# Patient Record
Sex: FEMALE | Race: WHITE | NOT HISPANIC OR LATINO | Employment: UNEMPLOYED | URBAN - METROPOLITAN AREA
[De-identification: names, ages, dates, MRNs, and addresses within clinical notes are randomized per-mention and may not be internally consistent; named-entity substitution may affect disease eponyms.]

---

## 2017-01-27 ENCOUNTER — OFFICE VISIT (OUTPATIENT)
Dept: FAMILY MEDICINE | Facility: CLINIC | Age: 57
End: 2017-01-27

## 2017-01-27 VITALS
WEIGHT: 166.4 LBS | BODY MASS INDEX: 31.46 KG/M2 | TEMPERATURE: 97.9 F | OXYGEN SATURATION: 98 % | HEART RATE: 74 BPM | DIASTOLIC BLOOD PRESSURE: 89 MMHG | RESPIRATION RATE: 16 BRPM | SYSTOLIC BLOOD PRESSURE: 133 MMHG

## 2017-01-27 DIAGNOSIS — M25.511 ACUTE PAIN OF RIGHT SHOULDER: ICD-10-CM

## 2017-01-27 RX ORDER — NAPROXEN 500 MG/1
500 TABLET ORAL 2 TIMES DAILY WITH MEALS
Qty: 30 TABLET | Refills: 1 | Status: SHIPPED | OUTPATIENT
Start: 2017-01-27 | End: 2017-09-06

## 2017-01-27 ASSESSMENT — ENCOUNTER SYMPTOMS
RESPIRATORY NEGATIVE: 1
CARDIOVASCULAR NEGATIVE: 1

## 2017-01-27 NOTE — PROGRESS NOTES
HPI:       Chrissy Looney is a 56 year old who presents for the following  Patient presents with:  Musculoskeletal Problem: right arm pain     Onset of right shoulder pain two weeks ago.  Start at right shoulder and then radiates down to mid biceps.    Is unable to sleep on right arm.  Aggravated with movement, writing and lifting right arm.  Ice application has been helpful.   No injury or trauma.   No recent change in activity.       Problem, Medication and Allergy Lists were reviewed and are current.  Patient is an established patient of this clinic.         Review of Systems:   Review of Systems   Respiratory: Negative.    Cardiovascular: Negative.    Musculoskeletal:        See HPI             Physical Exam:   Patient Vitals for the past 24 hrs:   BP Temp Temp src Pulse Resp SpO2 Weight   01/27/17 0824 133/89 mmHg 97.9  F (36.6  C) Oral 74 16 98 % 166 lb 6.4 oz (75.479 kg)     Body mass index is 31.46 kg/(m^2).  Vitals were reviewed and were normal     Physical Exam   Constitutional: She appears well-developed. No distress.   Musculoskeletal:        Right shoulder: She exhibits tenderness (diffuse ttp over anterior and posterior joint, extending down to right biceps region). She exhibits normal range of motion (pain elicited with range of motion), no swelling and normal strength.         Assessment and Plan     Chrissy was seen today for musculoskeletal problem.    Diagnoses and all orders for this visit:    Acute pain of right shoulder  -     naproxen (NAPROSYN) 500 MG tablet; Take 1 tablet (500 mg) by mouth 2 times daily (with meals)  -     Schedule for 5-7 days then use as needed.   -     Ice application.   -     Consider physical therapy.     Options for treatment and follow-up care were reviewed with the patient. Chrissy Looney  engaged in the decision making process and verbalized understanding of the options discussed and agreed with the final plan.    Olga Koenig, MAE CNP

## 2017-01-27 NOTE — PATIENT INSTRUCTIONS
Here is the plan from today's visit    1. Acute pain of right shoulder    Schedule Naproxen, 500 mg 2 times daily with meals - 12 hours apart.     Ice application 3 times daily for 15-20 minutes.      Consider physical therapy with no improvement or worsening of pain.      Thank you for coming to [facility] today.  Lab Testing:  **If you had lab testing today and your results are reassuring or normal they will be mailed to you or sent through [employer] within 7 days.   **If the lab tests need quick action we will call you with the results.  The phone number we will call with results is # [phone] (home) . If this is not the best number please call our clinic and change the number.  Medication Refills:  If you need any refills please call your pharmacy and they will contact us.   If you need to  your refill at a new pharmacy, please contact the new pharmacy directly. The new pharmacy will help you get your medications transferred faster.   Scheduling:  If you have any concerns about today's visit or wish to schedule another appointment please call our office during normal business hours [phone] (8-5:00 M-F)  If a referral was made to a [facility] and you don't get a call from central scheduling please call [phone].  If a Mammogram was ordered for you at [facility] call [phone] to schedule or change your appointment.  If you had an XRay/CT/Ultrasound/MRI ordered the number is [phone] to schedule or change your radiology appointment.   Medical Concerns:  If you have urgent medical concerns please call [phone] at any time of the day.  If you have a medical emergency please call [phone].

## 2017-01-27 NOTE — MR AVS SNAPSHOT
After Visit Summary   1/27/2017    Chrissy Looney    MRN: [id number]           Patient Information     Date Of Birth          1960        Visit Information        Provider Department      1/27/2017 8:20 AM Olga Koenig APRN CNP [facility]        Today's Diagnoses     Acute pain of right shoulder           Care Instructions    Here is the plan from today's visit    1. Acute pain of right shoulder    Schedule Naproxen, 500 mg 2 times daily with meals - 12 hours apart.     Ice application 3 times daily for 15-20 minutes.      Consider physical therapy with no improvement or worsening of pain.      Thank you for coming to [facility] today.  Lab Testing:  **If you had lab testing today and your results are reassuring or normal they will be mailed to you or sent through [employer] within 7 days.   **If the lab tests need quick action we will call you with the results.  The phone number we will call with results is # [phone] (home) . If this is not the best number please call our clinic and change the number.  Medication Refills:  If you need any refills please call your pharmacy and they will contact us.   If you need to  your refill at a new pharmacy, please contact the new pharmacy directly. The new pharmacy will help you get your medications transferred faster.   Scheduling:  If you have any concerns about today's visit or wish to schedule another appointment please call our office during normal business hours [phone] (8-5:00 M-F)  If a referral was made to a [facility] and you don't get a call from central scheduling please call [phone].  If a Mammogram was ordered for you at [facility] call [phone] to schedule or change your appointment.  If you had an XRay/CT/Ultrasound/MRI ordered the number is [phone] to schedule or change your radiology appointment.   Medical Concerns:  If you have urgent medical  concerns please call [phone] at any time of the day.  If you have a medical emergency please call 911.          Follow-ups after your visit        Who to contact     Please call your clinic at [phone] to:    Ask questions about your health    Make or cancel appointments    Discuss your medicines    Learn about your test results    Speak to your doctor   If you have compliments or concerns about an experience at your clinic, or if you wish to file a complaint, please contact [facility] Patient Relations at [phone] or email us at [email or web address]         Additional Information About Your Visit        [employer] Information     Kuotust is an electronic gateway that provides easy, online access to your medical records. With Ujogo, you can request a clinic appointment, read your test results, renew a prescription or communicate with your care team.     To sign up for Ujogo visit the website at [email or web address]   You will be asked to enter the access code listed below, as well as some personal information. Please follow the directions to create your username and password.     Your access code is: [email or web address]  Expires: 2017  8:41 AM     Your access code will  in 90 days. If you need help or a new code, please contact your [facility] or call [phone] for assistance.        Care EveryWhere ID     This is your Care EveryWhere ID. This could be used by other organizations to access your [facility] medical records  [id number]        Your Vitals Were     Pulse Temperature Respirations Pulse Oximetry Last Period       74 97.9  F (36.6  C) (Oral) 16 98% 2013        Blood Pressure from Last 3 Encounters:   17 133/89   16 123/76   16 128/85    Weight from Last 3 Encounters:   17 166 lb 6.4 oz (75.479 kg)   16 162 lb (73.483 kg)   16 162 lb 3.2 oz (73.573 kg)              Today,  you had the following     No orders found for display         Where to get your medicines      These medications were sent to [facility] [place], [place] - [address] , [facility] [address]     Phone:  [phone]    - naproxen 500 MG tablet       Primary Care Provider Office Phone # Fax #    MAE Mireles [phone]       [facility] [address]  [facility] [address]        Thank you!     Thank you for choosing [facility]  for your care. Our goal is always to provide you with excellent care. Hearing back from our patients is one way we can continue to improve our services. Please take a few minutes to complete the written survey that you may receive in the mail after your visit with us. Thank you!             Your Updated Medication List - Protect others around you: Learn how to safely use, store and throw away your medicines at www.disposemymeds.org.          This list is accurate as of: 1/27/17  8:41 AM.  Always use your most recent med list.                   Brand Name Dispense Instructions for use    ACETAMINOPHEN 8 HOUR 650 MG 8 hour tablet   Generic drug:  acetaminophen     100 tablet    Take 1 tablet by mouth every 8 hours as needed       capsaicin 0.075 % cream    ZOSTRIX    60 g    Apply topically 3 times daily as needed       cetirizine 10 MG tablet    zyrTEC    90 tablet    Take 1 tablet (10 mg) by mouth daily       conjugated estrogens cream    PREMARIN    30 g    Place 0.5 g vaginally twice a week       cyclobenzaprine 5 MG tablet    FLEXERIL    20 tablet    Take 0.5-1 tablets (2.5-5 mg) by mouth nightly as needed for muscle spasms       fluticasone 50 MCG/ACT spray    FLONASE    16 g    Spray 1-2 sprays into both nostrils daily       lisinopril-hydrochlorothiazide 20-12.5 MG per tablet    PRINZIDE/ZESTORETIC    90 tablet    Take 1 tablet by mouth daily       naproxen 500 MG tablet    NAPROSYN    30 tablet     Take 1 tablet (500 mg) by mouth 2 times daily (with meals)       pseudoePHEDrine 30 MG tablet    SUDAFED    28 tablet    Take 2 tablets (60 mg) by mouth every 6 hours as needed for congestion

## 2017-02-16 ENCOUNTER — OFFICE VISIT (OUTPATIENT)
Dept: AUDIOLOGY | Facility: CLINIC | Age: 57
End: 2017-02-16

## 2017-02-16 DIAGNOSIS — H90.3 SENSORY HEARING LOSS, BILATERAL: Primary | ICD-10-CM

## 2017-03-15 ENCOUNTER — OFFICE VISIT (OUTPATIENT)
Dept: FAMILY MEDICINE | Facility: CLINIC | Age: 57
End: 2017-03-15

## 2017-03-15 VITALS
BODY MASS INDEX: 32.01 KG/M2 | SYSTOLIC BLOOD PRESSURE: 134 MMHG | TEMPERATURE: 97.7 F | WEIGHT: 169.4 LBS | OXYGEN SATURATION: 98 % | DIASTOLIC BLOOD PRESSURE: 86 MMHG | HEART RATE: 87 BPM

## 2017-03-15 DIAGNOSIS — H60.501 ACUTE OTITIS EXTERNA OF RIGHT EAR, UNSPECIFIED TYPE: Primary | ICD-10-CM

## 2017-03-15 RX ORDER — CIPROFLOXACIN 0.5 MG/.25ML
1 SOLUTION/ DROPS AURICULAR (OTIC) EVERY 12 HOURS
Qty: 2.5 ML | Refills: 0 | Status: SHIPPED | OUTPATIENT
Start: 2017-03-15 | End: 2017-03-20

## 2017-03-15 ASSESSMENT — ENCOUNTER SYMPTOMS
CARDIOVASCULAR NEGATIVE: 1
SORE THROAT: 0
RESPIRATORY NEGATIVE: 1
SINUS PRESSURE: 0
CONSTITUTIONAL NEGATIVE: 1
RHINORRHEA: 0

## 2017-03-15 NOTE — PROGRESS NOTES
HPI:       Chrissy Looney is a 57 year old who presents for the following  Patient presents with:  Ear Problem: Ear pain/ache since february that is getting worse    Recent sinusitis - month of February. Treated this at home with sinus rinse, sudafed.      Onset of right ear pain since last Thursday. It is noticeable discomfort.      No fever or chills.  +Headaches on and off.       Problem, Medication and Allergy Lists were reviewed and are current.  Patient is an established patient of this clinic.         Review of Systems:   Review of Systems   Constitutional: Negative.    HENT: Positive for ear pain. Negative for congestion, rhinorrhea, sinus pressure and sore throat.    Respiratory: Negative.    Cardiovascular: Negative.              Physical Exam:   Patient Vitals for the past 24 hrs:   BP Temp Temp src Pulse SpO2 Weight   03/15/17 0756 134/86 97.7  F (36.5  C) Oral 87 98 % 169 lb 6.4 oz (76.8 kg)     Body mass index is 32.01 kg/(m^2).  Vitals were reviewed and were normal     Physical Exam   Constitutional: She appears well-nourished. No distress.   HENT:   Right Ear: Tympanic membrane normal. There is tenderness (right tragus).   Left Ear: Tympanic membrane and ear canal normal.   Nose: No rhinorrhea.   Mouth/Throat: Oropharynx is clear and moist.   Right ear canal with mild erythema   Cardiovascular: Normal rate and regular rhythm.    Pulmonary/Chest: Effort normal and breath sounds normal.         Assessment and Plan       Chrissy was seen today for ear problem.    Diagnoses and all orders for this visit:    Acute otitis externa of right ear, unspecified type  -     ciprofloxacin (CETRAXAL) 0.2 % otic solution; Place 0.25 mLs (1 Dropperette) into the right ear every 12 hours for 5 days    Follow-up with no improvement or worsening of symptoms.         Options for treatment and follow-up care were reviewed with the patient. Chrissy Looney  engaged in the decision making process and verbalized understanding  of the options discussed and agreed with the final plan.    Olga Koenig, MAE CNP

## 2017-03-15 NOTE — PATIENT INSTRUCTIONS
Here is the plan from today's visit    1. Acute otitis externa of right ear, unspecified type  - ciprofloxacin (CETRAXAL) 0.2 % otic solution; Place 0.25 mLs (1 Dropperette) into the right ear every 12 hours for 5 days  Dispense: 2.5 mL; Refill: 0        Please call or return to clinic if your symptoms don't go away.    Follow up plan - with no improvement or worsening of symptoms.     Thank you for coming to [facility] today.  Lab Testing:  **If you had lab testing today and your results are reassuring or normal they will be mailed to you or sent through [employer] within 7 days.   **If the lab tests need quick action we will call you with the results.  The phone number we will call with results is # [phone] (home) . If this is not the best number please call our clinic and change the number.  Medication Refills:  If you need any refills please call your pharmacy and they will contact us.   If you need to  your refill at a new pharmacy, please contact the new pharmacy directly. The new pharmacy will help you get your medications transferred faster.   Scheduling:  If you have any concerns about today's visit or wish to schedule another appointment please call our office during normal business hours [phone] (8-5:00 M-F)  If a referral was made to a [facility] and you don't get a call from central scheduling please call [phone].  If a Mammogram was ordered for you at [facility] call [phone] to schedule or change your appointment.  If you had an XRay/CT/Ultrasound/MRI ordered the number is [phone] to schedule or change your radiology appointment.   Medical Concerns:  If you have urgent medical concerns please call [phone] at any time of the day.  If you have a medical emergency please call [phone].    External Ear Infection (Adult)    External otitis (also called  swimmer s ear ) is an infection in the ear canal. It is often caused by bacteria or fungus.  It can occur a few days after water gets trapped in the ear canal (from swimming or bathing). It can also occur after cleaning too deeply in the ear canal with a cotton swab or other object. Sometimes, hair care products get into the ear canal and cause this problem.  Symptoms can include pain, fever, itching, redness, drainage, or swelling of the ear canal. Temporary hearing loss may also occur.  Home care    Do not try to clean the ear canal. This can push pus and bacteria deeper into the canal.    Use prescribed ear drops as directed. These help reduce swelling and fight the infection. If an ear wick was placed in the ear canal, apply drops right onto the end of the wick. The wick will draw the medication into the ear canal even if it is swollen closed.    A cotton ball may be loosely placed in the outer ear to absorb any drainage.    You may use acetaminophen or ibuprofen to control pain, unless another medication was prescribed. Note: If you have chronic liver or kidney disease or ever had a stomach ulcer or GI bleeding, talk to your health care provider before taking any of these medications.    Do not allow water to get into your ear when bathing. Also, avoid swimming until the infection has cleared.  Prevention    Keep your ears dry. This helps lower the risk of infection. Dry your ears with a towel or hair dryer after getting wet. Also, use ear plugs when swimming.    Do not stick any objects in the ear to remove wax.    If you feel water trapped in your ear, use ear drops right away. You can get these drops over the counter at most drugstores.  They work by removing water from the ear canal.  Follow-up care  Follow up with your health care provider in one week, or as advised.   When to seek medical advice  Call your health care provider right away if any of these occur:    Ear pain becomes worse or doesn t improve after 3 days of treatment    Redness or swelling of the outer ear occurs or gets  worse    Headache    Painful or stiff neck    Drowsiness or confusion    Fever of 100.4 F (38 C) or higher, or as directed by your health care provider    Seizure    7264-1033 [employer]. [address] [place] [address], [place] [address]. All rights reserved. This information is not intended as a substitute for professional medical care. Always follow your healthcare professional's instructions.

## 2017-03-15 NOTE — MR AVS SNAPSHOT
After Visit Summary   3/15/2017    Chrissy Looney    MRN: [id number]           Patient Information     Date Of Birth          1960        Visit Information        Provider Department      3/15/2017 8:00 AM Olga Koenig APRN CNP [facility]        Today's Diagnoses     Acute otitis externa of right ear, unspecified type    -  1      Care Instructions    Here is the plan from today's visit    1. Acute otitis externa of right ear, unspecified type  - ciprofloxacin (CETRAXAL) 0.2 % otic solution; Place 0.25 mLs (1 Dropperette) into the right ear every 12 hours for 5 days  Dispense: 2.5 mL; Refill: 0        Please call or return to clinic if your symptoms don't go away.    Follow up plan - with no improvement or worsening of symptoms.     Thank you for coming to [facility] today.  Lab Testing:  **If you had lab testing today and your results are reassuring or normal they will be mailed to you or sent through [employer] within 7 days.   **If the lab tests need quick action we will call you with the results.  The phone number we will call with results is # [phone] (home) . If this is not the best number please call our clinic and change the number.  Medication Refills:  If you need any refills please call your pharmacy and they will contact us.   If you need to  your refill at a new pharmacy, please contact the new pharmacy directly. The new pharmacy will help you get your medications transferred faster.   Scheduling:  If you have any concerns about today's visit or wish to schedule another appointment please call our office during normal business hours [phone] (8-5:00 M-F)  If a referral was made to a [facility] and you don't get a call from central scheduling please call [phone].  If a Mammogram was ordered for you at [facility] call [phone] to schedule or change your appointment.  If you had an XRay/CT/Ultrasound/MRI  ordered the number is [phone] to schedule or change your radiology appointment.   Medical Concerns:  If you have urgent medical concerns please call [phone] at any time of the day.  If you have a medical emergency please call [phone].    External Ear Infection (Adult)    External otitis (also called  swimmer s ear ) is an infection in the ear canal. It is often caused by bacteria or fungus. It can occur a few days after water gets trapped in the ear canal (from swimming or bathing). It can also occur after cleaning too deeply in the ear canal with a cotton swab or other object. Sometimes, hair care products get into the ear canal and cause this problem.  Symptoms can include pain, fever, itching, redness, drainage, or swelling of the ear canal. Temporary hearing loss may also occur.  Home care    Do not try to clean the ear canal. This can push pus and bacteria deeper into the canal.    Use prescribed ear drops as directed. These help reduce swelling and fight the infection. If an ear wick was placed in the ear canal, apply drops right onto the end of the wick. The wick will draw the medication into the ear canal even if it is swollen closed.    A cotton ball may be loosely placed in the outer ear to absorb any drainage.    You may use acetaminophen or ibuprofen to control pain, unless another medication was prescribed. Note: If you have chronic liver or kidney disease or ever had a stomach ulcer or GI bleeding, talk to your health care provider before taking any of these medications.    Do not allow water to get into your ear when bathing. Also, avoid swimming until the infection has cleared.  Prevention    Keep your ears dry. This helps lower the risk of infection. Dry your ears with a towel or hair dryer after getting wet. Also, use ear plugs when swimming.    Do not stick any objects in the ear to remove wax.    If you feel water trapped in your ear, use ear drops right away. You can get these drops over  the counter at most drugstores.  They work by removing water from the ear canal.  Follow-up care  Follow up with your health care provider in one week, or as advised.   When to seek medical advice  Call your health care provider right away if any of these occur:    Ear pain becomes worse or doesn t improve after 3 days of treatment    Redness or swelling of the outer ear occurs or gets worse    Headache    Painful or stiff neck    Drowsiness or confusion    Fever of 100.4 F (38 C) or higher, or as directed by your health care provider    Seizure    8195-8704 [employer]. [address] [place] [address]. All rights reserved. This information is not intended as a substitute for professional medical care. Always follow your healthcare professional's instructions.              Follow-ups after your visit        Who to contact     Please call your clinic at [phone] to:    Ask questions about your health    Make or cancel appointments    Discuss your medicines    Learn about your test results    Speak to your doctor   If you have compliments or concerns about an experience at your clinic, or if you wish to file a complaint, please contact [facility] Patient Relations at [phone] or email us at [email or web address]         Additional Information About Your Visit        [email or web address] Information     Solsticet is an electronic gateway that provides easy, online access to your medical records. With [employer], you can request a clinic appointment, read your test results, renew a prescription or communicate with your care team.     To sign up for Solsticet visit the website at [email or web address]   You will be asked to enter the access code listed below, as well as some personal information. Please follow the directions to create your username and password.     Your access code is: [email or web address]  Expires: 2017  9:41 AM     Your access code will  in 90  days. If you need help or a new code, please contact your [facility] or call [phone] for assistance.        Care EveryWhere ID     This is your Care EveryWhere ID. This could be used by other organizations to access your [place] medical records  [id number]        Your Vitals Were     Pulse Temperature Last Period Pulse Oximetry BMI (Body Mass Index)       87 97.7  F (36.5  C) (Oral) 11/04/2013 98% 32.01 kg/m2        Blood Pressure from Last 3 Encounters:   03/15/17 134/86   01/27/17 133/89   12/06/16 123/76    Weight from Last 3 Encounters:   03/15/17 169 lb 6.4 oz (76.8 kg)   01/27/17 166 lb 6.4 oz (75.5 kg)   12/06/16 162 lb (73.5 kg)              Today, you had the following     No orders found for display         Today's Medication Changes          These changes are accurate as of: 3/15/17  8:12 AM.  If you have any questions, ask your nurse or doctor.               Start taking these medicines.        Dose/Directions    ciprofloxacin 0.2 % otic solution   Commonly known as:  CETRAXAL   Used for:  Acute otitis externa of right ear, unspecified type   Started by:  Olga Koenig APRN CNP        Dose:  1 Dropperette   Place 0.25 mLs (1 Dropperette) into the right ear every 12 hours for 5 days   Quantity:  2.5 mL   Refills:  0            Where to get your medicines      These medications were sent to [facility] [place], [place] - [address] [place] [address]     Phone:  [phone]     ciprofloxacin 0.2 % otic solution                Primary Care Provider Office Phone # Fax #    MAE Mireles [phone]       [facility] [address]  [place] [id number]        Thank you!     Thank you for choosing [facility]  for your care. Our goal is always to provide you with excellent care. Hearing back from our patients is one way we can continue to improve our services. Please take  a few minutes to complete the written survey that you may receive in the mail after your visit with us. Thank you!             Your Updated Medication List - Protect others around you: Learn how to safely use, store and throw away your medicines at www.disposemymeds.org.          This list is accurate as of: 3/15/17  8:12 AM.  Always use your most recent med list.                   Brand Name Dispense Instructions for use    ACETAMINOPHEN 8 HOUR 650 MG 8 hour tablet   Generic drug:  acetaminophen     100 tablet    Take 1 tablet by mouth every 8 hours as needed       capsaicin 0.075 % cream    ZOSTRIX    60 g    Apply topically 3 times daily as needed       cetirizine 10 MG tablet    zyrTEC    90 tablet    Take 1 tablet (10 mg) by mouth daily       ciprofloxacin 0.2 % otic solution    CETRAXAL    2.5 mL    Place 0.25 mLs (1 Dropperette) into the right ear every 12 hours for 5 days       conjugated estrogens cream    PREMARIN    30 g    Place 0.5 g vaginally twice a week       cyclobenzaprine 5 MG tablet    FLEXERIL    20 tablet    Take 0.5-1 tablets (2.5-5 mg) by mouth nightly as needed for muscle spasms       fluticasone 50 MCG/ACT spray    FLONASE    16 g    Spray 1-2 sprays into both nostrils daily       lisinopril-hydrochlorothiazide 20-12.5 MG per tablet    PRINZIDE/ZESTORETIC    90 tablet    Take 1 tablet by mouth daily       naproxen 500 MG tablet    NAPROSYN    30 tablet    Take 1 tablet (500 mg) by mouth 2 times daily (with meals)       pseudoePHEDrine 30 MG tablet    SUDAFED    28 tablet    Take 2 tablets (60 mg) by mouth every 6 hours as needed for congestion

## 2017-03-23 ENCOUNTER — OFFICE VISIT (OUTPATIENT)
Dept: FAMILY MEDICINE | Facility: CLINIC | Age: 57
End: 2017-03-23

## 2017-03-23 VITALS
SYSTOLIC BLOOD PRESSURE: 149 MMHG | HEART RATE: 91 BPM | WEIGHT: 168.8 LBS | OXYGEN SATURATION: 100 % | DIASTOLIC BLOOD PRESSURE: 89 MMHG | TEMPERATURE: 97.7 F | BODY MASS INDEX: 31.89 KG/M2 | RESPIRATION RATE: 18 BRPM

## 2017-03-23 DIAGNOSIS — T14.8XXA MUSCULOSKELETAL STRAIN: Primary | ICD-10-CM

## 2017-03-23 ASSESSMENT — ENCOUNTER SYMPTOMS
RHINORRHEA: 0
NECK PAIN: 1
CONSTITUTIONAL NEGATIVE: 1
SINUS PRESSURE: 0
CARDIOVASCULAR NEGATIVE: 1
SORE THROAT: 0
EYES NEGATIVE: 1
RESPIRATORY NEGATIVE: 1

## 2017-03-23 NOTE — MR AVS SNAPSHOT
After Visit Summary   3/23/2017    Chrissy Looney    MRN: [id number]           Patient Information     Date Of Birth          1960        Visit Information        Provider Department      3/23/2017 9:20 AM Olga Koenig APRN CNP [facility]        Today's Diagnoses     Musculoskeletal strain    -  1      Care Instructions    Here is the plan from today's visit    1. Musculoskeletal strain - Right neck.     Schedule Naproxen, 500 mg 2 times daily for 5 days, then use as needed.    Heat application 3-4 times daily for 20 minutes.     Follow-up with no improvement or worsening of symptoms.         Thank you for coming to [facility] today.  Lab Testing:  **If you had lab testing today and your results are reassuring or normal they will be mailed to you or sent through [employer] within 7 days.   **If the lab tests need quick action we will call you with the results.  The phone number we will call with results is # [phone] (home) . If this is not the best number please call our clinic and change the number.  Medication Refills:  If you need any refills please call your pharmacy and they will contact us.   If you need to  your refill at a new pharmacy, please contact the new pharmacy directly. The new pharmacy will help you get your medications transferred faster.   Scheduling:  If you have any concerns about today's visit or wish to schedule another appointment please call our office during normal business hours [phone] (8-5:00 M-F)  If a referral was made to a [facility] and you don't get a call from central scheduling please call [phone].  If a Mammogram was ordered for you at [facility] call [phone] to schedule or change your appointment.  If you had an XRay/CT/Ultrasound/MRI ordered the number is [phone] to schedule or change your radiology appointment.   Medical Concerns:  If you have urgent medical concerns  please call [phone] at any time of the day.  If you have a medical emergency please call 911.          Follow-ups after your visit        Who to contact     Please call your clinic at [phone] to:    Ask questions about your health    Make or cancel appointments    Discuss your medicines    Learn about your test results    Speak to your doctor   If you have compliments or concerns about an experience at your clinic, or if you wish to file a complaint, please contact [facility] Patient Relations at [phone] or email us at [email or web address]         Additional Information About Your Visit        eTippinghar[employer]F [employer] Information     INFERNO FITNESS NASHVILLEt is an electronic gateway that provides easy, online access to your medical records. With [employer], you can request a clinic appointment, read your test results, renew a prescription or communicate with your care team.     To sign up for IN[employer] NASHVILLEt visit the website at [email or web address]   You will be asked to enter the access code listed below, as well as some personal information. Please follow the directions to create your username and password.     Your access code is: [email or web address]  Expires: 2017  9:41 AM     Your access code will  in 90 days. If you need help or a new code, please contact your [facility] or call [phone] for assistance.        Care EveryWhere ID     This is your Care EveryWhere ID. This could be used by other organizations to access your [place] medical records  [id number]        Your Vitals Were     Pulse Temperature Respirations Last Period Pulse Oximetry Breastfeeding?    91 97.7  F (36.5  C) (Oral) 18 2013 100% No    BMI (Body Mass Index)                   31.89 kg/m2            Blood Pressure from Last 3 Encounters:   17 149/89   03/15/17 134/86   17 133/89    Weight from Last 3 Encounters:   17 168 lb 12.8 oz (76.6 kg)   03/15/17 169 lb 6.4  oz (76.8 kg)   01/27/17 166 lb 6.4 oz (75.5 kg)              Today, you had the following     No orders found for display       Primary Care Provider Office Phone # Fax #    MAE Mireles [facility] [place] [phone]       [facility] [address] [facility] [address]        Thank you!     Thank you for choosing [facility]  for your care. Our goal is always to provide you with excellent care. Hearing back from our patients is one way we can continue to improve our services. Please take a few minutes to complete the written survey that you may receive in the mail after your visit with us. Thank you!             Your Updated Medication List - Protect others around you: Learn how to safely use, store and throw away your medicines at www.disposemymeds.org.          This list is accurate as of: 3/23/17  9:43 AM.  Always use your most recent med list.                   Brand Name Dispense Instructions for use    ACETAMINOPHEN 8 HOUR 650 MG 8 hour tablet   Generic drug:  acetaminophen     100 tablet    Take 1 tablet by mouth every 8 hours as needed       capsaicin 0.075 % cream    ZOSTRIX    60 g    Apply topically 3 times daily as needed       cetirizine 10 MG tablet    zyrTEC    90 tablet    Take 1 tablet (10 mg) by mouth daily       conjugated estrogens cream    PREMARIN    30 g    Place 0.5 g vaginally twice a week       cyclobenzaprine 5 MG tablet    FLEXERIL    20 tablet    Take 0.5-1 tablets (2.5-5 mg) by mouth nightly as needed for muscle spasms       fluticasone 50 MCG/ACT spray    FLONASE    16 g    Spray 1-2 sprays into both nostrils daily       lisinopril-hydrochlorothiazide 20-12.5 MG per tablet    PRINZIDE/ZESTORETIC    90 tablet    Take 1 tablet by mouth daily       naproxen 500 MG tablet    NAPROSYN    30 tablet    Take 1 tablet (500 mg) by mouth 2 times daily (with meals)       pseudoePHEDrine 30 MG tablet    SUDAFED    28 tablet    Take 2 tablets (60 mg) by mouth  every 6 hours as needed for congestion

## 2017-03-23 NOTE — PROGRESS NOTES
HPI:       Chrissy Looney is a 57 year old who presents for the following  Patient presents with:  Ear Problem: right ear pain medication not helping     Was seen in clinic on 3/15/17 for otitis externa of right ear, was prescribed ciprofloxacin otic drops.   Ear pain got better in the first 2 days, now patient reports pain extending down her right neck.   No hearing changes, ear drainage.      Has been using Flonase and allergy medication on a regular basis.         Problem, Medication and Allergy Lists were reviewed and are current.  Patient is an established patient of this clinic.         Review of Systems:   Review of Systems   Constitutional: Negative.    HENT: Negative for congestion, ear discharge, ear pain, rhinorrhea, sinus pressure and sore throat.    Eyes: Negative.    Respiratory: Negative.    Cardiovascular: Negative.    Musculoskeletal: Positive for neck pain.             Physical Exam:   Patient Vitals for the past 24 hrs:   BP Temp Temp src Pulse Resp SpO2 Weight   03/23/17 0924 149/89 97.7  F (36.5  C) Oral 91 18 100 % 168 lb 12.8 oz (76.6 kg)     Body mass index is 31.89 kg/(m^2).  Vitals were reviewed     Physical Exam   Constitutional: She is oriented to person, place, and time. She appears well-developed and well-nourished. No distress.   HENT:   Right Ear: Tympanic membrane and ear canal normal.   Left Ear: Tympanic membrane and ear canal normal.   Neck: Normal range of motion. Muscular tenderness (right SCM is ttp) present.   Cardiovascular: Normal rate and regular rhythm.    Pulmonary/Chest: Effort normal and breath sounds normal.   Neurological: She is alert and oriented to person, place, and time.         Assessment and Plan     Chrissy was seen today for ear problem.    Diagnoses and all orders for this visit:    Musculoskeletal strain - right SCM    Reassurance regarding ear exam, normal findings.   Discussed Naproxen, 500 mg 2 times daily for 5 days, then use as needed.   Heat  application to right neck 4 times daily for 20 minutes and as needed.     Follow-up with no improvement or worsening of symptoms.       Options for treatment and follow-up care were reviewed with the patient. Chrissy Looney  engaged in the decision making process and verbalized understanding of the options discussed and agreed with the final plan.    MAE Mireles CNP

## 2017-03-23 NOTE — PATIENT INSTRUCTIONS
Here is the plan from today's visit    1. Musculoskeletal strain - Right neck.     Schedule Naproxen, 500 mg 2 times daily for 5 days, then use as needed.    Heat application 3-4 times daily for 20 minutes.     Follow-up with no improvement or worsening of symptoms.         Thank you for coming to [facility] today.  Lab Testing:  **If you had lab testing today and your results are reassuring or normal they will be mailed to you or sent through [employer] within 7 days.   **If the lab tests need quick action we will call you with the results.  The phone number we will call with results is # [phone] (home) . If this is not the best number please call our clinic and change the number.  Medication Refills:  If you need any refills please call your pharmacy and they will contact us.   If you need to  your refill at a new pharmacy, please contact the new pharmacy directly. The new pharmacy will help you get your medications transferred faster.   Scheduling:  If you have any concerns about today's visit or wish to schedule another appointment please call our office during normal business hours [phone] (8-5:00 M-F)  If a referral was made to a [facility] and you don't get a call from central scheduling please call [phone].  If a Mammogram was ordered for you at [facility] call [phone] to schedule or change your appointment.  If you had an XRay/CT/Ultrasound/MRI ordered the number is [phone] to schedule or change your radiology appointment.   Medical Concerns:  If you have urgent medical concerns please call [phone] at any time of the day.  If you have a medical emergency please call [phone].

## 2017-04-06 ENCOUNTER — OFFICE VISIT (OUTPATIENT)
Dept: FAMILY MEDICINE | Facility: CLINIC | Age: 57
End: 2017-04-06

## 2017-04-06 VITALS
BODY MASS INDEX: 32.31 KG/M2 | RESPIRATION RATE: 16 BRPM | SYSTOLIC BLOOD PRESSURE: 112 MMHG | TEMPERATURE: 98 F | HEART RATE: 92 BPM | DIASTOLIC BLOOD PRESSURE: 76 MMHG | OXYGEN SATURATION: 96 % | WEIGHT: 171 LBS

## 2017-04-06 DIAGNOSIS — H92.01 RIGHT EAR PAIN: Primary | ICD-10-CM

## 2017-04-06 RX ORDER — AMOXICILLIN 500 MG/1
500 CAPSULE ORAL 3 TIMES DAILY
Qty: 30 CAPSULE | Refills: 0 | Status: SHIPPED | OUTPATIENT
Start: 2017-04-06 | End: 2017-04-16

## 2017-04-06 ASSESSMENT — ENCOUNTER SYMPTOMS
CONSTITUTIONAL NEGATIVE: 1
SORE THROAT: 0
RESPIRATORY NEGATIVE: 1
CARDIOVASCULAR NEGATIVE: 1

## 2017-04-06 NOTE — PATIENT INSTRUCTIONS
Here is the plan from today's visit    1. Right ear pain  - amoxicillin (AMOXIL) 500 MG capsule; Take 1 capsule (500 mg) by mouth 3 times daily for 10 days  Dispense: 30 capsule; Refill: 0      Follow-up with no improvement or worsening of symptoms.     Thank you for coming to [facility] today.  Lab Testing:  **If you had lab testing today and your results are reassuring or normal they will be mailed to you or sent through [employer] within 7 days.   **If the lab tests need quick action we will call you with the results.  The phone number we will call with results is # [phone] (home) . If this is not the best number please call our clinic and change the number.  Medication Refills:  If you need any refills please call your pharmacy and they will contact us.   If you need to  your refill at a new pharmacy, please contact the new pharmacy directly. The new pharmacy will help you get your medications transferred faster.   Scheduling:  If you have any concerns about today's visit or wish to schedule another appointment please call our office during normal business hours [phone] (8-5:00 M-F)  If a referral was made to a [facility] and you don't get a call from central scheduling please call [phone].  If a Mammogram was ordered for you at [facility] call [phone] to schedule or change your appointment.  If you had an XRay/CT/Ultrasound/MRI ordered the number is [phone] to schedule or change your radiology appointment.   Medical Concerns:  If you have urgent medical concerns please call [phone] at any time of the day.  If you have a medical emergency please call [phone].

## 2017-04-06 NOTE — PROGRESS NOTES
HPI:       Chrissy Looney is a 57 year old who presents for the following  Patient presents with:  Otalgia: RT ear pain     Continued right ear pain.  Right neck pain has improved.      Patient was originally seen on 3/15/17 for right ear pain, treated for otitis externa with otic antibiotic drops, but this wasn't tolerated and didn't improve pain.  She was then see on 3/23/17 for continued right ear and right neck pain.  No s/s of otitis externa or otitis media at that time.        Problem, Medication and Allergy Lists were reviewed and are current.  Patient is an established patient of this clinic.         Review of Systems:   Review of Systems   Constitutional: Negative.    HENT: Positive for ear pain. Negative for congestion, sneezing and sore throat.    Respiratory: Negative.    Cardiovascular: Negative.              Physical Exam:   Patient Vitals for the past 24 hrs:   BP Temp Temp src Pulse Resp SpO2 Weight   04/06/17 0930 112/76 98  F (36.7  C) Oral 92 16 96 % 171 lb (77.6 kg)     Body mass index is 32.31 kg/(m^2).  Vitals were reviewed and were normal     Physical Exam   Constitutional: She appears well-nourished. No distress.   HENT:   Right Ear: There is tenderness (tragus is ttp and with movement of pinna).   Left Ear: Tympanic membrane and ear canal normal. No tenderness. Tympanic membrane is not erythematous.   Right ear canal with mild erythema, right TM is pearly gray and bony landmarks are visible, small amount of fluid visible   Cardiovascular: Normal rate and regular rhythm.    Pulmonary/Chest: Effort normal and breath sounds normal. No respiratory distress. She has no wheezes.     Assessment and Plan       Chrissy was seen today for otalgia.    Diagnoses and all orders for this visit:    Right ear pain - mild otitis external  Patient did not previously tolerate otic drops    -     amoxicillin (AMOXIL) 500 MG capsule; Take 1 capsule (500 mg) by mouth 3 times daily for 10 days    Follow-up with  no improvement or worsening of symptoms.       Options for treatment and follow-up care were reviewed with the patient. Chrissy Looney  engaged in the decision making process and verbalized understanding of the options discussed and agreed with the final plan.    MAE Mireles CNP

## 2017-05-23 ENCOUNTER — OFFICE VISIT (OUTPATIENT)
Dept: FAMILY MEDICINE | Facility: CLINIC | Age: 57
End: 2017-05-23

## 2017-05-23 VITALS
WEIGHT: 173.4 LBS | TEMPERATURE: 98 F | SYSTOLIC BLOOD PRESSURE: 137 MMHG | DIASTOLIC BLOOD PRESSURE: 90 MMHG | BODY MASS INDEX: 32.76 KG/M2 | OXYGEN SATURATION: 98 % | RESPIRATION RATE: 18 BRPM | HEART RATE: 85 BPM

## 2017-05-23 DIAGNOSIS — I10 BENIGN ESSENTIAL HYPERTENSION: ICD-10-CM

## 2017-05-23 DIAGNOSIS — Z00.00 ROUTINE GENERAL MEDICAL EXAMINATION AT A HEALTH CARE FACILITY: Primary | ICD-10-CM

## 2017-05-23 DIAGNOSIS — F43.23 ADJUSTMENT DISORDER WITH MIXED ANXIETY AND DEPRESSED MOOD: ICD-10-CM

## 2017-05-23 DIAGNOSIS — J30.2 SEASONAL ALLERGIC RHINITIS, UNSPECIFIED ALLERGIC RHINITIS TRIGGER: ICD-10-CM

## 2017-05-23 LAB
ALBUMIN SERPL-MCNC: 4.3 MG/DL (ref 3.5–4.7)
ALP SERPL-CCNC: 62.1 U/L (ref 31.7–110.5)
ALT SERPL-CCNC: 35.2 U/L (ref 0–45)
AST SERPL-CCNC: 24.9 U/L (ref 0–45)
BILIRUB SERPL-MCNC: 0.4 MG/DL (ref 0.2–1.3)
BUN SERPL-MCNC: 17 MG/DL (ref 7–19)
CALCIUM SERPL-MCNC: 10.2 MG/DL (ref 8.5–10.1)
CHLORIDE SERPLBLD-SCNC: 103.1 MMOL/L (ref 98–110)
CO2 SERPL-SCNC: 29.3 MMOL/L (ref 20–32)
CREAT SERPL-MCNC: 0.6 MG/DL (ref 0.5–1)
GFR SERPL CREATININE-BSD FRML MDRD: >90 ML/MIN/1.7 M2
GLUCOSE SERPL-MCNC: 105.4 MG'DL (ref 70–99)
HBA1C MFR BLD: 6.1 % (ref 4.1–5.7)
POTASSIUM SERPL-SCNC: 4 MMOL/DL (ref 3.3–4.5)
PROT SERPL-MCNC: 7.7 G/DL (ref 6.8–8.8)
SODIUM SERPL-SCNC: 139.6 MMOL/L (ref 132.6–141.4)

## 2017-05-23 RX ORDER — CETIRIZINE HYDROCHLORIDE 10 MG/1
10 TABLET ORAL DAILY
Qty: 90 TABLET | Refills: 3 | Status: SHIPPED | OUTPATIENT
Start: 2017-05-23 | End: 2017-12-07

## 2017-05-23 RX ORDER — LISINOPRIL AND HYDROCHLOROTHIAZIDE 12.5; 2 MG/1; MG/1
1 TABLET ORAL DAILY
Qty: 90 TABLET | Refills: 3 | Status: SHIPPED | OUTPATIENT
Start: 2017-05-23 | End: 2018-05-25

## 2017-05-23 NOTE — PROGRESS NOTES
"  Female Physical Note          HPI         Concerns today: increased \"moodiness\" over the past one month, worries a lot. +Irritability towards .      No history of treatment for depression or anxiety.  Daughters have depression/anxiety history.      Daughter,  and grandson are moving back home for approximately one month.  Daughter is also pregnant currently. This is a new stressor for patient.       Patient Active Problem List   Diagnosis     Essential hypertension     Glucose intolerance (impaired glucose tolerance)     Prediabetes     Low back pain     Chronic maxillary sinusitis       Past Medical History:   Diagnosis Date     NO ACTIVE PROBLEMS         Family History   Problem Relation Age of Onset     CANCER Mother      Hypertension Mother      DIABETES Sister      Alcohol/Drug Sister      Alcohol/Drug Brother      Allergies Other      Alzheimer Disease Other      aunt     Thyroid Disease Other      daughter     Hypertension Father               Review of Systems:     Review of Systems:  CONSTITUTIONAL: NEGATIVE for fever, chills, change in weight  INTEGUMENTARY/SKIN: NEGATIVE for worrisome rashes, moles or lesions  EYES: NEGATIVE for vision changes or irritation  ENT/MOUTH: NEGATIVE for ear, mouth and throat problems, wears hearing aids  RESP: NEGATIVE for significant cough or SOB  BREAST: NEGATIVE for masses, tenderness or discharge  CV: NEGATIVE for chest pain, palpitations or peripheral edema  GI: NEGATIVE for nausea, abdominal pain, heartburn, or change in bowel habits  : NEGATIVE for frequency, dysuria, or hematuria  MUSCULOSKELETAL: NEGATIVE for significant arthralgias or myalgia  NEURO: NEGATIVE for weakness, dizziness or paresthesias  ENDOCRINE: NEGATIVE for temperature intolerance, skin/hair changes  HEME/ALLERGY: NEGATIVE for bleeding problems  PSYCHIATRIC: see HPI    Sleep:   Do you snore most or the night (as reported by a family member)? No  Do you feel sleepy or extremely tired " during most of the day? No             Social History     Social History     Social History     Marital status:      Spouse name: N/A     Number of children: N/A     Years of education: N/A     Occupational History     Not on file.     Social History Main Topics     Smoking status: Former Smoker     Quit date: 2004     Smokeless tobacco: Not on file      Comment: quit      Alcohol use Yes      Comment: once  a month     Drug use: Not on file     Sexual activity: Not on file     Other Topics Concern     Not on file     Social History Narrative       Marital Status:  Who lives in your household? Lives with     Has anyone hurt you physically, for example by pushing, hitting, slapping or kicking you or forcing you to have sex? Denies  Do you feel threatened or controlled by a partner, ex-partner or anyone in your life? Denies    Sexual Health     Sexual concerns: No   STI History: Neg  Pregnancy History:   LMP Patient's last menstrual period was 2016  Last Pap Smear Date:   Lab Results   Component Value Date    PAP NIL 2014    PAP ASC-US 2013    PAP NIL 2010     Abnormal Pap History: Details: see above    Recommended Screening     Cholesterol Level (>44 yo or at risk):  Date done 5/3/16  Result(s)   Component Value Flag Ref Range Units Status Collected Lab   Cholesterol 180.7  0.0 - 200.0 mg/dL Final 2016  8:14 AM SM   Cholesterol/HDL Ratio 4.6  0.0 - 5.0  Final 2016  8:14 AM SM   HDL Cholesterol 39.5 (L) >40.0 mg/dL Final 2016  8:14 AM SM   LDL Cholesterol Calculated 119  0 - 129 mg/dL Final 2016  8:14 AM SM   Triglycerides 109.5  0.0 - 150.0 mg/dL Final 2016  8:14 AM SM   VLDL Cholesterol 21.9  7.0 - 32.0 mg/dL Final 2016  8:14 AM SM       Breast CA Screening (>39 yo or 10 y before 1st degree relative diagnosis): Recommended and patient accepted testing.           Physical Exam:     Vitals: /90  Pulse 85  Temp 98   F (36.7  C) (Oral)  Resp 18  Wt 173 lb 6.4 oz (78.7 kg)  LMP 11/04/2013  SpO2 98%  BMI 32.76 kg/m2  BMI= Body mass index is 32.76 kg/(m^2).   GENERAL: healthy, alert and no distress  EYES: Eyes grossly normal to inspection, extraocular movements - intact, and PERRL  HENT: ear canals- normal; TMs- normal; Nose- normal; Mouth- no ulcers, no lesions  NECK: no tenderness, no adenopathy, no asymmetry, no masses, no stiffness; thyroid- normal to palpation  RESP: lungs clear to auscultation - no rales, no rhonchi, no wheezes  CV: regular rates and rhythm, normal S1 S2, no S3 or S4 and no murmur  ABDOMEN: soft, no tenderness, no  hepatosplenomegaly, no masses, normal bowel sounds  MS: extremities- no gross deformities noted, no edema  SKIN: no suspicious lesions, no rashes  NEURO: strength and tone- normal, sensory exam- grossly normal, mentation- intact, speech- normal, reflexes- symmetric  PSYCH: Alert and oriented times 3; speech- coherent , normal rate and volume; able to articulate logical thoughts, able to abstract reason, no tangential thoughts, no hallucinations or delusions, affect- normal  LYMPHATICS: ant. cervical- normal, post. cervical- normal      Assessment and Plan      Chrissy was seen today for physical.    Diagnoses and all orders for this visit:    Routine general medical examination at a health care facility  -     Screening Mammogram Digital Bilateral; Future  -     Hemoglobin A1c ([facility])  -     Comprehensive Metabolic Panel ([facility])  -     Hepatitis C antibody    Seasonal allergic rhinitis, unspecified allergic rhinitis trigger  -     cetirizine (ZYRTEC) 10 MG tablet; Take 1 tablet (10 mg) by mouth daily    Benign essential hypertension  -     lisinopril-hydrochlorothiazide (PRINZIDE/ZESTORETIC) 20-12.5 MG per tablet; Take 1 tablet by mouth daily    Adjustment disorder with mixed anxiety and depressed mood - increased irritability  PHQ-9 score:  9  DONG-7 score;  4  -     BEHAVIORAL HEALTH  REFERRAL ([facility] and external) - patient open to starting therapy, happy that there are services here in clinic as she walks from her home that is close to the clinic.     Follow-up with no improvement or worsening.     Options for treatment and follow-up care were reviewed with the patient . Chrissy [place] and/or guardian engaged in the decision making process and verbalized understanding of the options discussed and agreed with the final plan.    Olga Koenig, MAE CNP

## 2017-05-23 NOTE — LETTER
May 24, 2017      Chrissy Looney  [address] [address]        Dear Chrissy,    Thank you for getting your care at [facility]. Please see below for your test results.    Your Hemoglobin A1C (Diabetes screening test) was stable and still indicates Pre-Diabetes.     The Hepatitis C screening test was negative and reassuring.     Your kidney and liver function tests were normal and reassuring.      Resulted Orders   Hemoglobin A1c ([facility])   Result Value Ref Range    Hemoglobin A1C 6.1 (H) 4.1 - 5.7 %   Comprehensive Metabolic Panel ([facility])   Result Value Ref Range    Albumin 4.3 3.5 - 4.7 mg/dL    Alkaline Phosphatase 62.1 31.7 - 110.5 U/L    ALT 35.2 0.0 - 45.0 U/L    AST 24.9 0.0 - 45.0 U/L    Bilirubin Total 0.4 0.2 - 1.3 mg/dL    Urea Nitrogen 17.0 7.0 - 19.0 mg/dL    Calcium 10.2 (H) 8.5 - 10.1 mg/dL    Chloride 103.1 98.0 - 110.0 mmol/L    Carbon Dioxide 29.3 20.0 - 32.0 mmol/L    Creatinine 0.6 0.5 - 1.0 mg/dL    Glucose 105.4 (H) 70.0 - 99.0 mg'dL    Potassium 4.0 3.3 - 4.5 mmol/dL    Sodium 139.6 132.6 - 141.4 mmol/L    Protein Total 7.7 6.8 - 8.8 g/dL    GFR Estimate >90 >60.0 mL/min/1.7 m2    GFR Estimate If Black >90 >60.0 mL/min/1.7 m2   Hepatitis C antibody   Result Value Ref Range    Hepatitis C Antibody  NR     Nonreactive   Assay performance characteristics have not been established for newborns,   infants, and children         If you have any concerns about these results please call and leave a message for me or send a Stitcht message to the clinic.    Sincerely,    Olga oKenig, MAE CNP

## 2017-05-23 NOTE — MR AVS SNAPSHOT
After Visit Summary   5/23/2017    Chrissy Looney    MRN: [id number]           Patient Information     Date Of Birth          1960        Visit Information        Provider Department      5/23/2017 8:20 AM Olga Koenig APRN CNP [facility]        Today's Diagnoses     Routine general medical examination at a health care facility    -  1    Seasonal allergic rhinitis, unspecified allergic rhinitis trigger        Benign essential hypertension        Adjustment disorder with mixed anxiety and depressed mood          Care Instructions    [facility] [phone]    Here is the plan from today's visit    1. Seasonal allergic rhinitis, unspecified allergic rhinitis trigger  - cetirizine (ZYRTEC) 10 MG tablet; Take 1 tablet (10 mg) by mouth daily  Dispense: 90 tablet; Refill: 3    2. Benign essential hypertension  - lisinopril-hydrochlorothiazide (PRINZIDE/ZESTORETIC) 20-12.5 MG per tablet; Take 1 tablet by mouth daily  Dispense: 90 tablet; Refill: 3    3. Routine general medical examination at a health care facility  - Screening Mammogram Digital Bilateral; Future  - Hemoglobin A1c ([facility])  - Comprehensive Metabolic Panel ([facility])  - Hepatitis C antibody        Thank you for coming to [facility] today.  Lab Testing:  **If you had lab testing today and your results are reassuring or normal they will be mailed to you or sent through Dumbstruck within 7 days.   **If the lab tests need quick action we will call you with the results.  The phone number we will call with results is # [phone] (home) . If this is not the best number please call our clinic and change the number.  Medication Refills:  If you need any refills please call your pharmacy and they will contact us.   If you need to  your refill at a new pharmacy, please contact the new pharmacy directly. The new pharmacy will help you get your medications transferred faster.   Scheduling:  If you have  any concerns about today's visit or wish to schedule another appointment please call our office during normal business hours [phone] (8-5:00 M-F)  If a referral was made to a [facility] and you don't get a call from central scheduling please call [phone].  If a Mammogram was ordered for you at [facility] call [phone] to schedule or change your appointment.  If you had an XRay/CT/Ultrasound/MRI ordered the number is [phone] to schedule or change your radiology appointment.   Medical Concerns:  If you have urgent medical concerns please call [phone] at any time of the day.  If you have a medical emergency please call 112.        Preventive Health Recommendations  Female Ages 50 - 64    Yearly exam: See your health care provider every year in order to  o Review health changes.   o Discuss preventive care.    o Review your medicines if your doctor has prescribed any.      Get a Pap test every three years (unless you have an abnormal result and your provider advises testing more often).    If you get Pap tests with HPV test, you only need to test every 5 years, unless you have an abnormal result.     You do not need a Pap test if your uterus was removed (hysterectomy) and you have not had cancer.    You should be tested each year for STDs (sexually transmitted diseases) if you're at risk.     Have a mammogram every 1 to 2 years.    Have a colonoscopy at age 50, or have a yearly FIT test (stool test). These exams screen for colon cancer.      Have a cholesterol test every 5 years, or more often if advised.    Have a diabetes test (fasting glucose) every three years. If you are at risk for diabetes, you should have this test more often.     If you are at risk for osteoporosis (brittle bone disease), think about having a bone density scan (DEXA).    Shots: Get a flu shot each year. Get a tetanus shot every 10 years.    Nutrition:     Eat at least 5 servings of fruits  and vegetables each day.    Eat whole-grain bread, whole-wheat pasta and brown rice instead of white grains and rice.    Talk to your provider about Calcium and Vitamin D.     Lifestyle    Exercise at least 150 minutes a week (30 minutes a day, 5 days a week). This will help you control your weight and prevent disease.    Limit alcohol to one drink per day.    No smoking.     Wear sunscreen to prevent skin cancer.     See your dentist every six months for an exam and cleaning.    See your eye doctor every 1 to 2 years.          Follow-ups after your visit        Additional Services     BEHAVIORAL HEALTH REFERRAL ([facility] inter[facility] and external)       Referring MD: KALYANI BAUMAN    May leave message on voicemail: Yes  PHQ-9 Score: 9  GAD7 Score: 4                  Future tests that were ordered for you today     Open Future Orders        Priority Expected Expires Ordered    Screening Mammogram Digital Bilateral Routine  5/23/2018 5/23/2017            Who to contact     Please call your clinic at [phone] to:    Ask questions about your health    Make or cancel appointments    Discuss your medicines    Learn about your test results    Speak to your doctor   If you have compliments or concerns about an experience at your clinic, or if you wish to file a complaint, please contact [facility] Patient Relations at [phone] or email us at [email or web address]         Additional Information About Your Visit        [employer] Mobile[employer]IPDIGS Information     Yassets is an electronic gateway that provides easy, online access to your medical records. With Yassets, you can request a clinic appointment, read your test results, renew a prescription or communicate with your care team.     To sign up for Yassets visit the website at [email or web address]   You will be asked to enter the access code listed below, as well as some personal information. Please follow the directions to create your  username and password.     Your access code is: [email or web address]  Expires: 2017  8:57 AM     Your access code will  in 90 days. If you need help or a new code, please contact your [facility] or call [phone] for assistance.        Care EveryWhere ID     This is your Care EveryWhere ID. This could be used by other organizations to access your [place] medical records  [id number]        Your Vitals Were     Pulse Temperature Respirations Last Period Pulse Oximetry BMI (Body Mass Index)    85 98  F (36.7  C) (Oral) 18 2013 98% 32.76 kg/m2       Blood Pressure from Last 3 Encounters:   17 137/90   17 112/76   17 149/89    Weight from Last 3 Encounters:   17 173 lb 6.4 oz (78.7 kg)   17 171 lb (77.6 kg)   17 168 lb 12.8 oz (76.6 kg)              We Performed the Following     BEHAVIORAL HEALTH REFERRAL ([facility] interal and external)     Comprehensive Metabolic Panel ([facility])     Hemoglobin A1c ([facility])     Hepatitis C antibody          Where to get your medicines      These medications were sent to [facility] [place], [place] - [address] [address]     Phone:  [phone]     cetirizine 10 MG tablet    lisinopril-hydrochlorothiazide 20-12.5 MG per tablet          Primary Care Provider Office Phone # Fax #    Olga MAE [facility] [phone]       [facility] [address] [facility] [address] [address]        Thank you!     Thank you for choosing [facility]  for your care. Our goal is always to provide you with excellent care. Hearing back from our patients is one way we can continue to improve our services. Please take a few minutes to complete the written survey that you may receive in the mail after your visit with us. Thank you!             Your Updated Medication List - Protect others around you: Learn how to safely use, store and throw away  your medicines at www.disposemymeds.org.          This list is accurate as of: 5/23/17  8:57 AM.  Always use your most recent med list.                   Brand Name Dispense Instructions for use    ACETAMINOPHEN 8 HOUR 650 MG 8 hour tablet   Generic drug:  acetaminophen     100 tablet    Take 1 tablet by mouth every 8 hours as needed       capsaicin 0.075 % cream    ZOSTRIX    60 g    Apply topically 3 times daily as needed       cetirizine 10 MG tablet    zyrTEC    90 tablet    Take 1 tablet (10 mg) by mouth daily       conjugated estrogens cream    PREMARIN    30 g    Place 0.5 g vaginally twice a week       cyclobenzaprine 5 MG tablet    FLEXERIL    20 tablet    Take 0.5-1 tablets (2.5-5 mg) by mouth nightly as needed for muscle spasms       fluticasone 50 MCG/ACT spray    FLONASE    16 g    Spray 1-2 sprays into both nostrils daily       lisinopril-hydrochlorothiazide 20-12.5 MG per tablet    PRINZIDE/ZESTORETIC    90 tablet    Take 1 tablet by mouth daily       naproxen 500 MG tablet    NAPROSYN    30 tablet    Take 1 tablet (500 mg) by mouth 2 times daily (with meals)       pseudoePHEDrine 30 MG tablet    SUDAFED    28 tablet    Take 2 tablets (60 mg) by mouth every 6 hours as needed for congestion

## 2017-05-23 NOTE — PATIENT INSTRUCTIONS
[facility] [phone]    Here is the plan from today's visit    1. Seasonal allergic rhinitis, unspecified allergic rhinitis trigger  - cetirizine (ZYRTEC) 10 MG tablet; Take 1 tablet (10 mg) by mouth daily  Dispense: 90 tablet; Refill: 3    2. Benign essential hypertension  - lisinopril-hydrochlorothiazide (PRINZIDE/ZESTORETIC) 20-12.5 MG per tablet; Take 1 tablet by mouth daily  Dispense: 90 tablet; Refill: 3    3. Routine general medical examination at a health care facility  - Screening Mammogram Digital Bilateral; Future  - Hemoglobin A1c ([facility])  - Comprehensive Metabolic Panel ([facility])  - Hepatitis C antibody        Thank you for coming to [facility] today.  Lab Testing:  **If you had lab testing today and your results are reassuring or normal they will be mailed to you or sent through [employer] within 7 days.   **If the lab tests need quick action we will call you with the results.  The phone number we will call with results is # [phone] (home) . If this is not the best number please call our clinic and change the number.  Medication Refills:  If you need any refills please call your pharmacy and they will contact us.   If you need to  your refill at a new pharmacy, please contact the new pharmacy directly. The new pharmacy will help you get your medications transferred faster.   Scheduling:  If you have any concerns about today's visit or wish to schedule another appointment please call our office during normal business hours [phone] (8-5:00 M-F)  If a referral was made to a [facility] and you don't get a call from central scheduling please call [phone].  If a Mammogram was ordered for you at [facility] call [phone] to schedule or change your appointment.  If you had an XRay/CT/Ultrasound/MRI ordered the number is [phone] to schedule or change your radiology appointment.   Medical Concerns:  If you have urgent medical concerns  please call [phone] at any time of the day.  If you have a medical emergency please call [phone].        Preventive Health Recommendations  Female Ages 50 - 64    Yearly exam: See your health care provider every year in order to  o Review health changes.   o Discuss preventive care.    o Review your medicines if your doctor has prescribed any.      Get a Pap test every three years (unless you have an abnormal result and your provider advises testing more often).    If you get Pap tests with HPV test, you only need to test every 5 years, unless you have an abnormal result.     You do not need a Pap test if your uterus was removed (hysterectomy) and you have not had cancer.    You should be tested each year for STDs (sexually transmitted diseases) if you're at risk.     Have a mammogram every 1 to 2 years.    Have a colonoscopy at age 50, or have a yearly FIT test (stool test). These exams screen for colon cancer.      Have a cholesterol test every 5 years, or more often if advised.    Have a diabetes test (fasting glucose) every three years. If you are at risk for diabetes, you should have this test more often.     If you are at risk for osteoporosis (brittle bone disease), think about having a bone density scan (DEXA).    Shots: Get a flu shot each year. Get a tetanus shot every 10 years.    Nutrition:     Eat at least 5 servings of fruits and vegetables each day.    Eat whole-grain bread, whole-wheat pasta and brown rice instead of white grains and rice.    Talk to your provider about Calcium and Vitamin D.     Lifestyle    Exercise at least 150 minutes a week (30 minutes a day, 5 days a week). This will help you control your weight and prevent disease.    Limit alcohol to one drink per day.    No smoking.     Wear sunscreen to prevent skin cancer.     See your dentist every six months for an exam and cleaning.    See your eye doctor every 1 to 2 years.

## 2017-05-24 ENCOUNTER — TELEPHONE (OUTPATIENT)
Dept: FAMILY MEDICINE | Facility: CLINIC | Age: 57
End: 2017-05-24

## 2017-05-24 LAB — HCV AB SERPL QL IA: NORMAL

## 2017-05-24 ASSESSMENT — PATIENT HEALTH QUESTIONNAIRE - PHQ9: SUM OF ALL RESPONSES TO PHQ QUESTIONS 1-9: 9

## 2017-05-24 NOTE — TELEPHONE ENCOUNTER
Mental Health Referral:  Please schedule with DIALLO Bishop      If you are unable to reach the patient after two phone attempts, please send a letter and close the encounter.      Thank you!

## 2017-05-25 ENCOUNTER — RADIANT APPOINTMENT (OUTPATIENT)
Dept: MAMMOGRAPHY | Facility: CLINIC | Age: 57
End: 2017-05-25
Attending: FAMILY MEDICINE
Payer: COMMERCIAL

## 2017-05-25 DIAGNOSIS — Z00.00 ROUTINE GENERAL MEDICAL EXAMINATION AT A HEALTH CARE FACILITY: ICD-10-CM

## 2017-05-25 PROCEDURE — G0202 SCR MAMMO BI INCL CAD: HCPCS

## 2017-05-31 ENCOUNTER — OFFICE VISIT (OUTPATIENT)
Dept: PSYCHOLOGY | Facility: CLINIC | Age: 57
End: 2017-05-31

## 2017-05-31 DIAGNOSIS — F43.23 ADJUSTMENT DISORDER WITH MIXED ANXIETY AND DEPRESSED MOOD: Primary | ICD-10-CM

## 2017-05-31 NOTE — MR AVS SNAPSHOT
After Visit Summary   2017    Chrissy Looney    MRN: [id number]           Patient Information     Date Of Birth          1960        Visit Information        Provider Department      2017 9:00 AM Lana Tejeda LICSW [facility]        Today's Diagnoses     Adjustment disorder with mixed anxiety and depressed mood    -  1       Follow-ups after your visit        Follow-up notes from your care team     Return in about 1 week (around 2017).      Your next 10 appointments already scheduled     2017 10:00 AM CDT   Return Visit with Lnaa Davis DIALLO [facility] ([facility])    [address],  [address]  [place] [address]   [phone]              Who to contact     Please call your clinic at [phone] to:    Ask questions about your health    Make or cancel appointments    Discuss your medicines    Learn about your test results    Speak to your doctor   If you have compliments or concerns about an experience at your clinic, or if you wish to file a complaint, please contact [facility] Patient Relations at [phone] or email us at [email or web address]         Additional Information About Your Visit        MyChart Information     Musicraiserhart is an electronic gateway that provides easy, online access to your medical records. With Threefold Photos, you can request a clinic appointment, read your test results, renew a prescription or communicate with your care team.     To sign up for SADAR 3Dt visit the website at [email or web address]   You will be asked to enter the access code listed below, as well as some personal information. Please follow the directions to create your username and password.     Your access code is: [email or web address]  Expires: 2017  8:57 AM     Your access code will  in 90 days. If you need help or a new code, please contact your [facility]  [place] [facility] or call [phone] for assistance.        Care EveryWhere ID     This is your Care EveryWhere ID. This could be used by other organizations to access your [place] medical records  [id number]        Your Vitals Were     Last Period                   11/04/2013            Blood Pressure from Last 3 Encounters:   05/23/17 137/90   04/06/17 112/76   03/23/17 149/89    Weight from Last 3 Encounters:   05/23/17 173 lb 6.4 oz (78.7 kg)   04/06/17 171 lb (77.6 kg)   03/23/17 168 lb 12.8 oz (76.6 kg)              Today, you had the following     No orders found for display       Primary Care Provider Office Phone # Fax #    Olga MAE Turner [facility] [phone]       [facility] [address] [facility] [address]        Thank you!     Thank you for choosing [facility]  for your care. Our goal is always to provide you with excellent care. Hearing back from our patients is one way we can continue to improve our services. Please take a few minutes to complete the written survey that you may receive in the mail after your visit with us. Thank you!             Your Updated Medication List - Protect others around you: Learn how to safely use, store and throw away your medicines at www.disposemymeds.org.          This list is accurate as of: 5/31/17 11:59 PM.  Always use your most recent med list.                   Brand Name Dispense Instructions for use    ACETAMINOPHEN 8 HOUR 650 MG 8 hour tablet   Generic drug:  acetaminophen     100 tablet    Take 1 tablet by mouth every 8 hours as needed       capsaicin 0.075 % cream    ZOSTRIX    60 g    Apply topically 3 times daily as needed       cetirizine 10 MG tablet    zyrTEC    90 tablet    Take 1 tablet (10 mg) by mouth daily       conjugated estrogens cream    PREMARIN    30 g    Place 0.5 g vaginally twice a week       cyclobenzaprine 5 MG tablet    FLEXERIL    20 tablet    Take 0.5-1 tablets (2.5-5 mg)  by mouth nightly as needed for muscle spasms       fluticasone 50 MCG/ACT spray    FLONASE    16 g    Spray 1-2 sprays into both nostrils daily       lisinopril-hydrochlorothiazide 20-12.5 MG per tablet    PRINZIDE/ZESTORETIC    90 tablet    Take 1 tablet by mouth daily       naproxen 500 MG tablet    NAPROSYN    30 tablet    Take 1 tablet (500 mg) by mouth 2 times daily (with meals)       pseudoePHEDrine 30 MG tablet    SUDAFED    28 tablet    Take 2 tablets (60 mg) by mouth every 6 hours as needed for congestion

## 2017-06-01 ASSESSMENT — ANXIETY QUESTIONNAIRES
3. WORRYING TOO MUCH ABOUT DIFFERENT THINGS: MORE THAN HALF THE DAYS
7. FEELING AFRAID AS IF SOMETHING AWFUL MIGHT HAPPEN: NOT AT ALL
5. BEING SO RESTLESS THAT IT IS HARD TO SIT STILL: SEVERAL DAYS
1. FEELING NERVOUS, ANXIOUS, OR ON EDGE: NOT AT ALL
6. BECOMING EASILY ANNOYED OR IRRITABLE: MORE THAN HALF THE DAYS
IF YOU CHECKED OFF ANY PROBLEMS ON THIS QUESTIONNAIRE, HOW DIFFICULT HAVE THESE PROBLEMS MADE IT FOR YOU TO DO YOUR WORK, TAKE CARE OF THINGS AT HOME, OR GET ALONG WITH OTHER PEOPLE: NOT DIFFICULT AT ALL
GAD7 TOTAL SCORE: 7
2. NOT BEING ABLE TO STOP OR CONTROL WORRYING: SEVERAL DAYS

## 2017-06-01 ASSESSMENT — PATIENT HEALTH QUESTIONNAIRE - PHQ9: 5. POOR APPETITE OR OVEREATING: SEVERAL DAYS

## 2017-06-01 NOTE — PROGRESS NOTES
"Behavioral Health Diagnostic Assessment:    Meeting was: scheduled  Others present: none   Meeting lasted: 60 minutes  Client was: on time    Assessment Summary: Diagnostic completed today. The patient is a 57 year old American female who was referred for mental health services for help with anxiety and depressive symptoms. Based on the patient's report of symptoms, she likely meets criteria for an anxiety disorder. However, additional assessment is warranted and a diagnosis of Adjustment disorder with mixed anxiety and depressed mood is being given today. The patient's mental health concerns have been affecting her ability to function well with her grandchildren and irritability toward her  and have been causing clinically significant distress. The patient also reports infrequent social drug/alcohol use The patient is also struggling with setting boundaries with her family with regard to caring for her grandchildren and asking for what she needs in her marriage.  She denies any safety concerns. Patient is feeling optimistic about starting therapy based on experiences that other family members and friends have had with therapy.  Based on the patient's reported symptoms and impact on functioning, the plan for the patient is outpatient psychotherapy at [facility].    DSM-V Diagnoses:  Adjustment Disorder with Anxiety and depressed mood    Orientation, Recommendations, & Plan:       Rights to and limits to confidentiality were discussed with patient.    Integrated care team and shared chart were discussed with patient and agreed upon.    Follow-up in 1 week to establish regular psychotherapy to address anxiety, mood symptoms, and difficulties in interpersonal relationships.    Goals for therapy = Patient would like to figure out how to get back to \"happy\" and feel engaged in her life again.    Collaboration with other professionals is not indicated at this time.    Referral to another professional/service " "is not indicated at this time..    A Release of Information is not needed at this time.    Mental Health Screening Questionnaires:    PHQ-9 SCORE 5/23/2017   Total Score 9     No flowsheet data found.  PTSD-PC = 0/4  CAGE AID:  0/4   Complete responses are available for review in the flow-sheet.     Current Presenting Problems or Complaints (including patient perception of problem and external factors contributing to current dilemma):   Patient has been considering coming to therapy for some time. She feels that things are okay in her life but feels that she is not like herself and feels anxious and worried all the time. She has some difficulty in her relationships but has a difficult time talking to those closest to her about how she is feeling.  Socially, she has noticed a decrease in close friendships and enjoys doing projects and crafts but finds herself doing them on her own.   Her spouse is not very engaged their marriage, per her report and she would really like to spend more time with him.    Review of Symptoms:  Depression: \"empty feeling\", sadness  Kirsten: none  Psychosis: none  Anxiety: Worry about family, most life type things. Difficulty with sleep due to racing thoughts.  Post Traumatic Stress Disorder: none    Patient Strengths and Resources: Patient is engaged with several hobbies and enjoys doing activities.     Previous Mental Health Concerns/Treatment:    Outpatient: None    Inpatient: None    Chemical Health History: denies any current or past concerns    Have you ever thought about cutting down your drug/alcohol use?  No  Do you ever get annoyed when people ask you about your drug/alcohol use: No  Do you ever feel guilty about your drug/alcohol use: No  Do you ever drink alcohol or use drugs in the morning: No    Patient past attempts to control alcohol/drug use (including informal approaches or formal treatment): none  Have there been any consequences related to clients drug use? no.    Mental " Status Exam:    Appearance:  Casually dressed, Adequately groomed, Dressed appropriately for weather and Appears younger than stated age  Behavior/Relationship to Examiner/Demeanor:  Cooperative and Engaged  Build: medium  Gait:  Normal  Psychomotor Activity: Normal  Speech rate:  Normal  Speech volume:  Normal  Speech coherence:  Normal  Speech spontaneity:  Normal  Mood (subjective report):  sad, anxious and worried  Affect (objective appearance):  Appropriate/mood-congruent  Eye Contact: good  Thought Process (Associations):  Logical, Linear and Goal directed  Thought process (Rate):  Normal  Abnormal Perception:  None  Sensorium:  Alert, Oriented to person, Oriented to place, Oriented to date/time and Oriented to situation  Attention/Concentration:  Normal  Insight:  Good  Judgment:  Good    Suicide Assessment:    Recent suicidal thoughts: No  Past suicidal thoughts: No  Any attempts in the past: No  Any family/friends/loved ones commit suicide: No  Plan or considering various methods: No  Access to guns: No  Protective factors: commitment to family, stable housing and future oriented  Verbal contract for safety: N/A    Non-Suicidal Self Injurious Behavior: No    Violence/Homicide Risk Assessment:     Problems with anger management: No  History of violence: No  History of significant damage to property: No  Threat made to harm or kill someone: No  Verbal contract for safety:NA    Safety Plan:   Chrissy Looney was provided with the phone number for emergency mental health services and was encouraged to call these local crisis numbers, 911, or visit a local emergency room if thoughts of suicide or homicide were to arise and/or if she were to be in acute distress. The patient agreed to utilize these services as indicated if the need were to arise. Chrissy Looney denied past or current suicidal or homicidal ideation, intent, or plan, denied a history of suicide attempts/self-harming behaviors. Based on these factors,  Chrissy Looney is considered to be sustainable as an outpatient at this time.     Social History and Associated Level of Functioning (See below):    Current Living Arrangements: patient lives with her spouse in her parents home. She got the house after her mother's death. Patient's daughter,  and child have also temporarily moved in.    Family/Children: Patient has two adult daughters with four grandchildren.    Intimate Relationship/Marriage:  She has been  for 34 years. She describes her  as a good, helpful man but their relationship is distant and she would like to spend more time with him.      Social Connection: She does not see her friends as much as she would like to anymore. She feels somewhat isolated as her friends are busy with their families.    Developmental History:  Typical    Abuse/Trauma: Denies    Work: Patient stayed at home and raised their children. She cared for her mother when she was sick and now cares for her grandchildren when they are off school.     Education: She completed high school    Legal: denies    Cultural/Belief System: Patient is  to a man from [place] heritage but does not speak Iranian nor has learned much about his heritage over the years.    Personal Health:     Patient Active Problem List   Diagnosis     Essential hypertension     Glucose intolerance (impaired glucose tolerance)     Prediabetes     Low back pain     Chronic maxillary sinusitis     Current Outpatient Prescriptions   Medication     cetirizine (ZYRTEC) 10 MG tablet     lisinopril-hydrochlorothiazide (PRINZIDE/ZESTORETIC) 20-12.5 MG per tablet     naproxen (NAPROSYN) 500 MG tablet     conjugated estrogens (PREMARIN) cream     pseudoePHEDrine (SUDAFED) 30 MG tablet     fluticasone (FLONASE) 50 MCG/ACT nasal spray     cyclobenzaprine (FLEXERIL) 5 MG tablet     capsaicin (ZOSTRIX) 0.075 % topical cream     ACETAMINOPHEN 8 HOUR 650 MG TABS     No current facility-administered medications  "for this visit.        NOTE: Diagnostic complete       Primary Care PTSD Screen  In your life, have you ever had any experience that was so frightening, horrible or upsetting that, in the past month, you...    1. Have had nightmares about it or thought about it when you did not want to? No  2. Tried hard not to think about it or went out of your way to avoid situations that remind you of it? No  3. Were constantly on guard, watchful, or easily startled? No  4. Felt numb or detached from others, activities, or your surroundings? No    Current research suggests that the results of the PC-PTSD should be considered \"positive\" if a patient answers \"yes\" to any (3) items.    References    JAKI Emanuel., BRUCE Graves., Kimerling, R., THOMAS Lawrence., ALICE Prieto., CLAUS Vanegas., ROMARIO Hancock, SERA Paige., Morrow, J.I. (2004). The primary care PTSD screen (PC-PTSD): development and operating characteristics. Primary Care Psychiatry, 9, 9-14.  "

## 2017-06-02 ASSESSMENT — PATIENT HEALTH QUESTIONNAIRE - PHQ9: SUM OF ALL RESPONSES TO PHQ QUESTIONS 1-9: 8

## 2017-06-02 ASSESSMENT — ANXIETY QUESTIONNAIRES: GAD7 TOTAL SCORE: 7

## 2017-07-13 ENCOUNTER — OFFICE VISIT (OUTPATIENT)
Dept: AUDIOLOGY | Facility: CLINIC | Age: 57
End: 2017-07-13

## 2017-07-13 DIAGNOSIS — H90.3 SENSORY HEARING LOSS, BILATERAL: Primary | ICD-10-CM

## 2017-09-06 ENCOUNTER — OFFICE VISIT (OUTPATIENT)
Dept: FAMILY MEDICINE | Facility: CLINIC | Age: 57
End: 2017-09-06

## 2017-09-06 VITALS
HEART RATE: 88 BPM | SYSTOLIC BLOOD PRESSURE: 135 MMHG | DIASTOLIC BLOOD PRESSURE: 86 MMHG | RESPIRATION RATE: 20 BRPM | WEIGHT: 161.2 LBS | BODY MASS INDEX: 30.46 KG/M2 | TEMPERATURE: 98 F | OXYGEN SATURATION: 97 %

## 2017-09-06 DIAGNOSIS — R10.13 ABDOMINAL PAIN, EPIGASTRIC: Primary | ICD-10-CM

## 2017-09-06 LAB
ALBUMIN SERPL-MCNC: 4.6 MG/DL (ref 3.5–4.7)
ALP SERPL-CCNC: 60.2 U/L (ref 31.7–110.5)
ALT SERPL-CCNC: 19.6 U/L (ref 0–45)
AST SERPL-CCNC: 16.8 U/L (ref 0–45)
BILIRUB SERPL-MCNC: <0.4 MG/DL (ref 0.2–1.3)
BUN SERPL-MCNC: 8.6 MG/DL (ref 7–19)
CALCIUM SERPL-MCNC: 9.2 MG/DL (ref 8.5–10.1)
CHLORIDE SERPLBLD-SCNC: 99.4 MMOL/L (ref 98–110)
CO2 SERPL-SCNC: 31.8 MMOL/L (ref 20–32)
CREAT SERPL-MCNC: 0.6 MG/DL (ref 0.5–1)
GFR SERPL CREATININE-BSD FRML MDRD: >90 ML/MIN/1.7 M2
GLUCOSE SERPL-MCNC: 111.9 MG'DL (ref 70–99)
POTASSIUM SERPL-SCNC: 3.6 MMOL/DL (ref 3.3–4.5)
PROT SERPL-MCNC: 8 G/DL (ref 6.8–8.8)
SODIUM SERPL-SCNC: 136.3 MMOL/L (ref 132.6–141.4)

## 2017-09-06 ASSESSMENT — ENCOUNTER SYMPTOMS
COUGH: 0
ABDOMINAL PAIN: 1
FEVER: 0
SHORTNESS OF BREATH: 0

## 2017-09-06 NOTE — MR AVS SNAPSHOT
After Visit Summary   9/6/2017    Chrissy Looney    MRN: [id number]           Patient Information     Date Of Birth          1960        Visit Information        Provider Department      9/6/2017 3:00 PM Moshe Cardozo MD [facility]        Today's Diagnoses     Abdominal pain, epigastric    -  1      Care Instructions    Here is the plan from today's visit    1. Abdominal pain, epigastric    - Comprehensive Metabolic Panel (LabDAQ)  - H. Pylori Antibody IGG  (LabDAQ)    Please call or return to clinic if your symptoms don't go away.    Follow up plan  Will be contacted via phone or mail for results.     Thank you for coming to [facility] today.  Lab Testing:  **If you had lab testing today and your results are reassuring or normal they will be mailed to you or sent through Hooked within 7 days.   **If the lab tests need quick action we will call you with the results.  The phone number we will call with results is # [phone] (home) . If this is not the best number please call our clinic and change the number.  Medication Refills:  If you need any refills please call your pharmacy and they will contact us.   If you need to  your refill at a new pharmacy, please contact the new pharmacy directly. The new pharmacy will help you get your medications transferred faster.   Scheduling:  If you have any concerns about today's visit or wish to schedule another appointment please call our office during normal business hours [phone] (8-5:00 M-F)  If a referral was made to a [facility] and you don't get a call from central scheduling please call [phone].  If a Mammogram was ordered for you at [facility] call [phone] to schedule or change your appointment.  If you had an XRay/CT/Ultrasound/MRI ordered the number is [phone] to schedule or change your radiology appointment.   Medical Concerns:  If you have urgent medical  concerns please call [phone] at any time of the day.  If you have a medical emergency please call 911.            Follow-ups after your visit        Who to contact     Please call your clinic at [phone] to:    Ask questions about your health    Make or cancel appointments    Discuss your medicines    Learn about your test results    Speak to your doctor   If you have compliments or concerns about an experience at your clinic, or if you wish to file a complaint, please contact [facility] Patient Relations at [phone] or email us at [email or web address]         Additional Information About Your Visit        ReppifyharSepSensor Information     Lighthouse BCSt is an electronic gateway that provides easy, online access to your medical records. With Teravac, you can request a clinic appointment, read your test results, renew a prescription or communicate with your care team.     To sign up for Teravac visit the website at [email or web address]   You will be asked to enter the access code listed below, as well as some personal information. Please follow the directions to create your username and password.     Your access code is: [email or web address]  Expires: 2017  3:46 PM     Your access code will  in 90 days. If you need help or a new code, please contact your [facility] or call [phone] for assistance.        Care EveryWhere ID     This is your Care EveryWhere ID. This could be used by other organizations to access your [place] medical records  [id number]        Your Vitals Were     Pulse Temperature Respirations Last Period Pulse Oximetry BMI (Body Mass Index)    88 98  F (36.7  C) (Oral) 20 2013 97% 30.46 kg/m2       Blood Pressure from Last 3 Encounters:   17 135/86   17 137/90   17 112/76    Weight from Last 3 Encounters:   17 161 lb 3.2 oz (73.1 kg)   17 173 lb 6.4 oz (78.7 kg)   17 171 lb (77.6  kg)              We Performed the Following     Comprehensive Metabolic Panel (LabDAQ)     H. Pylori Antibody IGG  (LabDAQ)        Primary Care Provider Office Phone # Fax #    MAE Mireles [facility] [phone]       [address] [facility] [address]        Equal Access to Services     CHUY CAMACHO : Hadii aad ku hadasho Soomaali, waaxda luqadaha, qaybta kaalmada adeegyada, waxay felixin haymarixan salima rodolfonorma daniels . So [facility] [phone].    ATENCIÓN: Si habla español, tiene a enriquez disposición servicios gratuitos de asistencia lingüística. Eriberto al [phone].    We comply with applicable federal civil rights laws and [place] laws. We do not discriminate on the basis of race, color, national origin, age, disability sex, sexual orientation or gender identity.            Thank you!     Thank you for choosing [facility]  for your care. Our goal is always to provide you with excellent care. Hearing back from our patients is one way we can continue to improve our services. Please take a few minutes to complete the written survey that you may receive in the mail after your visit with us. Thank you!             Your Updated Medication List - Protect others around you: Learn how to safely use, store and throw away your medicines at www.disposemymeds.org.          This list is accurate as of: 9/6/17  3:46 PM.  Always use your most recent med list.                   Brand Name Dispense Instructions for use Diagnosis    ACETAMINOPHEN 8 HOUR 650 MG 8 hour tablet   Generic drug:  acetaminophen     100 tablet    Take 1 tablet by mouth every 8 hours as needed    Left shoulder pain, Shoulder joint pain, left       capsaicin 0.075 % cream    ZOSTRIX    60 g    Apply topically 3 times daily as needed    Back pain, Left shoulder pain       cetirizine 10 MG tablet    zyrTEC    90 tablet    Take 1 tablet (10 mg) by mouth daily    Seasonal allergic rhinitis, unspecified allergic rhinitis  trigger       conjugated estrogens cream    PREMARIN    30 g    Place 0.5 g vaginally twice a week    Symptomatic menopausal or female climacteric states       cyclobenzaprine 5 MG tablet    FLEXERIL    20 tablet    Take 0.5-1 tablets (2.5-5 mg) by mouth nightly as needed for muscle spasms    Back pain       lisinopril-hydrochlorothiazide 20-12.5 MG per tablet    PRINZIDE/ZESTORETIC    90 tablet    Take 1 tablet by mouth daily    Benign essential hypertension       pseudoePHEDrine 30 MG tablet    SUDAFED    28 tablet    Take 2 tablets (60 mg) by mouth every 6 hours as needed for congestion    Dysfunction of eustachian tube, right

## 2017-09-06 NOTE — PROGRESS NOTES
HPI:       Chrissy Looney is a 57 year old who presents for the following  Patient presents with:  RECHECK: Follow up, Abdominal pain        Concern: Abdominal Pain   Description of the problem :  Started 8 days ago spontaneously.  Pain is dull, epigastrci.  No improvement noticed per pt with Ranitidine. Has had previous similar pain in the past but the symptoms have come back without relief with OTC medication. No c/o fever, pain scale 8/10 fluctuating. Pain is mild in the morning and gets worse throughout the day with meals.     Started taking Omeprazole per Pharmacist 9/5/17.  This is second day and she has not noticed any difference in symptoms.       Adherence and Exercise  Medication side effects: no  How often is a medication missed? Never  Exercise:walking  4-5 days/week for an average of 15-30 minutes          Problem, Medication and Allergy Lists were reviewed and are current.  Patient is an established patient of this clinic.         Review of Systems:   Review of Systems   Constitutional: Negative for fever.   Respiratory: Negative for cough and shortness of breath.    Gastrointestinal: Positive for abdominal pain.             Physical Exam:   Patient Vitals for the past 24 hrs:   BP Temp Temp src Pulse Resp SpO2 Weight   09/06/17 1504 135/86 98  F (36.7  C) Oral 88 20 97 % 161 lb 3.2 oz (73.1 kg)     Body mass index is 30.46 kg/(m^2).  Vitals were reviewed and were normal     Physical Exam   Constitutional: She appears well-developed and well-nourished. No distress.   Neck: Normal range of motion. Neck supple. No thyromegaly present.   Cardiovascular: Normal rate, regular rhythm, normal heart sounds and intact distal pulses.    No murmur heard.  Pulmonary/Chest: Effort normal and breath sounds normal. She has no wheezes.   Abdominal: Soft. Bowel sounds are normal. She exhibits no distension. There is no hepatosplenomegaly. There is tenderness in the periumbilical area. There is no CVA tenderness.    Bowel sounds are present but hypoactive, pain xypoid process, no other present tenderness in abdomen.   Lymphadenopathy:     She has no cervical adenopathy.         Results:       Assessment and Plan     Chrissy was seen today for recheck.    Diagnoses and all orders for this visit:    Abdominal pain, epigastric - diff dx includes PUD, gastritis, cholelithiasis.  It is a little early to evaluate effectiveness of PPI therapy.  She will continue omeprazole and we will investigate bloodwork for PUD and gall bladder disease.  -     Comprehensive Metabolic Panel (LabDAQ)  -     Cancel: H. Pylori Antibody IGG  (LabDAQ)  -     H Pylori antigen stool; Future      There are no discontinued medications.  Options for treatment and follow-up care were reviewed with the patient. Chrissy Looney  engaged in the decision making process and verbalized understanding of the options discussed and agreed with the final plan.    Moshe Cardozo MD

## 2017-09-06 NOTE — PATIENT INSTRUCTIONS
Here is the plan from today's visit    1. Abdominal pain, epigastric    - Comprehensive Metabolic Panel ([facility])  - H. Pylori Antibody IGG  ([facility])    Please call or return to clinic if your symptoms don't go away.    Follow up plan  Will be contacted via phone or mail for results.     Thank you for coming to [facility] today.  Lab Testing:  **If you had lab testing today and your results are reassuring or normal they will be mailed to you or sent through TripMark within 7 days.   **If the lab tests need quick action we will call you with the results.  The phone number we will call with results is # [phone] (home) . If this is not the best number please call our clinic and change the number.  Medication Refills:  If you need any refills please call your pharmacy and they will contact us.   If you need to  your refill at a new pharmacy, please contact the new pharmacy directly. The new pharmacy will help you get your medications transferred faster.   Scheduling:  If you have any concerns about today's visit or wish to schedule another appointment please call our office during normal business hours [phone] (8-5:00 M-F)  If a referral was made to a [facility] and you don't get a call from central scheduling please call [phone].  If a Mammogram was ordered for you at [facility] call [phone] to schedule or change your appointment.  If you had an XRay/CT/Ultrasound/MRI ordered the number is [phone] to schedule or change your radiology appointment.   Medical Concerns:  If you have urgent medical concerns please call [phone] at any time of the day.  If you have a medical emergency please call [phone].

## 2017-09-07 DIAGNOSIS — R10.13 ABDOMINAL PAIN, EPIGASTRIC: ICD-10-CM

## 2017-09-11 LAB
H PYLORI AG STL QL IA: NORMAL
SPECIMEN SOURCE: NORMAL

## 2017-09-18 ENCOUNTER — TELEPHONE (OUTPATIENT)
Dept: FAMILY MEDICINE | Facility: CLINIC | Age: 57
End: 2017-09-18

## 2017-09-18 NOTE — TELEPHONE ENCOUNTER
----- Message from Moshe Cardozo MD sent at 9/18/2017  2:11 PM CDT -----  Please call this pt and ask how her abdominal pain is doing.  You can inform her that her labwork was fine.

## 2017-09-18 NOTE — TELEPHONE ENCOUNTER
"Called patient per request. Patient reports her pain is \"100% better\"    Message routed to Dr CLAUS Cardozo to update.    Patricia Enriquez RN    "

## 2017-10-17 ENCOUNTER — OFFICE VISIT (OUTPATIENT)
Dept: AUDIOLOGY | Facility: CLINIC | Age: 57
End: 2017-10-17

## 2017-10-17 DIAGNOSIS — H90.3 SENSORY HEARING LOSS, BILATERAL: Primary | ICD-10-CM

## 2017-11-17 ENCOUNTER — ALLIED HEALTH/NURSE VISIT (OUTPATIENT)
Dept: FAMILY MEDICINE | Facility: CLINIC | Age: 57
End: 2017-11-17

## 2017-11-17 DIAGNOSIS — Z00.00 ROUTINE GENERAL MEDICAL EXAMINATION AT A HEALTH CARE FACILITY: Primary | ICD-10-CM

## 2017-11-17 NOTE — NURSING NOTE

## 2017-11-17 NOTE — MR AVS SNAPSHOT
After Visit Summary   11/17/2017    Chrissy Looney    MRN: [id number]           Patient Information     Date Of Birth          1960        Visit Information        Provider Department      11/17/2017 9:00 AM [facility]        Today's Diagnoses     Routine general medical examination at a health care facility    -  1       Follow-ups after your visit        Who to contact     Please call your clinic at [phone] to:    Ask questions about your health    Make or cancel appointments    Discuss your medicines    Learn about your test results    Speak to your doctor   If you have compliments or concerns about an experience at your clinic, or if you wish to file a complaint, please contact [facility] Patient Relations at [phone] or email us at [email or web address]         Additional Information About Your Visit        MyChart Information     [employer] gives you secure access to your electronic health record. If you see a primary care provider, you can also send messages to your care team and make appointments. If you have questions, please call your primary care clinic.  If you do not have a primary care provider, please call [phone] and they will assist you.      iBuildApp is an electronic gateway that provides easy, online access to your medical records. With iBuildApp, you can request a clinic appointment, read your test results, renew a prescription or communicate with your care team.     To access your existing account, please contact your [facility] or call [phone] for assistance.        Care EveryWhere ID     This is your Care EveryWhere ID. This could be used by other organizations to access your [place] medical records  [id number]        Your Vitals Were     Last Period                   11/04/2013            Blood Pressure from Last 3 Encounters:   09/06/17 135/86    05/23/17 137/90   04/06/17 112/76    Weight from Last 3 Encounters:   09/06/17 161 lb 3.2 oz (73.1 kg)   05/23/17 173 lb 6.4 oz (78.7 kg)   04/06/17 171 lb (77.6 kg)              We Performed the Following     ADMIN VACCINE, INITIAL     FLU VAC PRESRV FREE QUAD SPLIT VIR IM, 0.5 mL dosage        Primary Care Provider Office Phone # Fax #    Olga Koenig, APRN [facility] [phone]       [address] [facility] [address]        Equal Access to Services     [facility]: Hadii aad ku hadasho Sochelly, waaxda ericaqadaha, qaybta kaalmaelvira abebe, brigitte daniels . So [facility] [phone].    ATENCIÓN: Si habla español, tiene a enriquez disposición servicios gratuitos de asistencia lingüística. [facility] [phone].    We comply with applicable federal civil rights laws and [place] laws. We do not discriminate on the basis of race, color, national origin, age, disability, sex, sexual orientation, or gender identity.            Thank you!     Thank you for choosing \[facility]\"" [facility]  for your care. Our goal is always to provide you with excellent care. Hearing back from our patients is one way we can continue to improve our services. Please take a few minutes to complete the written survey that you may receive in the mail after your visit with us. Thank you!             Your Updated Medication List - Protect others around you: Learn how to safely use, store and throw away your medicines at www.disposemymeds.org.          This list is accurate as of: 11/17/17 11:59 PM.  Always use your most recent med list.                   Brand Name Dispense Instructions for use Diagnosis    ACETAMINOPHEN 8 HOUR 650 MG 8 hour tablet   Generic drug:  acetaminophen     100 tablet    Take 1 tablet by mouth every 8 hours as needed    Left shoulder pain, Shoulder joint pain, left       capsaicin 0.075 % cream    ZOSTRIX    60 g    Apply topically 3 times daily as needed    Back pain, Left  shoulder pain       cetirizine 10 MG tablet    zyrTEC    90 tablet    Take 1 tablet (10 mg) by mouth daily    Seasonal allergic rhinitis, unspecified allergic rhinitis trigger       conjugated estrogens cream    PREMARIN    30 g    Place 0.5 g vaginally twice a week    Symptomatic menopausal or female climacteric states       cyclobenzaprine 5 MG tablet    FLEXERIL    20 tablet    Take 0.5-1 tablets (2.5-5 mg) by mouth nightly as needed for muscle spasms    Back pain       lisinopril-hydrochlorothiazide 20-12.5 MG per tablet    PRINZIDE/ZESTORETIC    90 tablet    Take 1 tablet by mouth daily    Benign essential hypertension       pseudoePHEDrine 30 MG tablet    SUDAFED    28 tablet    Take 2 tablets (60 mg) by mouth every 6 hours as needed for congestion    Dysfunction of Eustachian tube, right

## 2017-12-07 ENCOUNTER — OFFICE VISIT (OUTPATIENT)
Dept: FAMILY MEDICINE | Facility: CLINIC | Age: 57
End: 2017-12-07

## 2017-12-07 ENCOUNTER — TELEPHONE (OUTPATIENT)
Dept: FAMILY MEDICINE | Facility: CLINIC | Age: 57
End: 2017-12-07

## 2017-12-07 VITALS
RESPIRATION RATE: 16 BRPM | HEART RATE: 94 BPM | WEIGHT: 167.2 LBS | OXYGEN SATURATION: 98 % | TEMPERATURE: 98.1 F | DIASTOLIC BLOOD PRESSURE: 87 MMHG | SYSTOLIC BLOOD PRESSURE: 132 MMHG | BODY MASS INDEX: 31.59 KG/M2

## 2017-12-07 DIAGNOSIS — N95.1 SYMPTOMATIC MENOPAUSAL OR FEMALE CLIMACTERIC STATES: ICD-10-CM

## 2017-12-07 DIAGNOSIS — N89.8 VAGINAL IRRITATION: Primary | ICD-10-CM

## 2017-12-07 LAB
BACTERIA: NORMAL
CLUE CELLS: NORMAL
MOTILE TRICHOMONAS: NEGATIVE
ODOR: NORMAL
PH WET PREP: <4.5
WBC WET PREP: NORMAL
YEAST: NORMAL

## 2017-12-07 RX ORDER — ESTRADIOL 0.1 MG/G
2 CREAM VAGINAL
Qty: 42.5 G | Refills: 6 | Status: SHIPPED | OUTPATIENT
Start: 2017-12-07 | End: 2018-07-19

## 2017-12-07 ASSESSMENT — ENCOUNTER SYMPTOMS
MYALGIAS: 0
DIARRHEA: 0
ABDOMINAL PAIN: 0
CONSTIPATION: 0
FREQUENCY: 1
CHILLS: 0
NAUSEA: 0
DYSURIA: 1
FEVER: 0

## 2017-12-07 NOTE — MR AVS SNAPSHOT
After Visit Summary   12/7/2017    Chrissy Looney    MRN: [id number]           Patient Information     Date Of Birth          1960        Visit Information        Provider Department      12/7/2017 8:40 AM Olga Koenig APRN CNP [facility]        Today's Diagnoses     Vaginal irritation    -  1    Symptomatic menopausal or female climacteric states          Care Instructions    Here is the plan from today's visit    1. Vaginal irritation  - Wet Prep ([facility])    Results for orders placed or performed in visit on 12/07/17   Wet Prep ([facility])   Result Value Ref Range    Yeast Wet Prep None none    Motile Trichomonas Wet Prep Negative Negative    Clue Cells Wet Prep None NONE    WBC WET PREP 10-25 2 - 5    Bacteria Wet Prep Few None    pH Wet Prep <4.5 3.8 - 4.5    Odor Wet Prep None NONE       2. Symptomatic menopausal or female climacteric states  - estradiol (ESTRACE VAGINAL) 0.1 MG/GM cream; Place 2 g vaginally twice a week  Dispense: 42.5 g; Refill: 6  Continue to use vaseline or Aquaphor to external vagina as needed.       Follow-up with no improvement or worsening of symptoms.       Thank you for coming to [facility] today.  Lab Testing:  **If you had lab testing today and your results are reassuring or normal they will be mailed to you or sent through Jeeves within 7 days.   **If the lab tests need quick action we will call you with the results.  The phone number we will call with results is # [phone] (home) . If this is not the best number please call our clinic and change the number.  Medication Refills:  If you need any refills please call your pharmacy and they will contact us.   If you need to  your refill at a new pharmacy, please contact the new pharmacy directly. The new pharmacy will help you get your medications transferred faster.   Scheduling:  If you have any concerns about today's visit or wish to schedule another appointment please  call our office during normal business hours [phone] (8-5:00 M-F)  If a referral was made to a [facility] and you don't get a call from central scheduling please call [phone].  If a Mammogram was ordered for you at [facility] call [phone] to schedule or change your appointment.  If you had an XRay/CT/Ultrasound/MRI ordered the number is [phone] to schedule or change your radiology appointment.   Medical Concerns:  If you have urgent medical concerns please call [phone] at any time of the day.  If you have a medical emergency please call [phone].            Follow-ups after your visit        Who to contact     Please call your clinic at [phone] to:    Ask questions about your health    Make or cancel appointments    Discuss your medicines    Learn about your test results    Speak to your doctor   If you have compliments or concerns about an experience at your clinic, or if you wish to file a complaint, please contact [facility] Patient Relations at [phone] or email us at [email or web address]         Additional Information About Your Visit        [employer] Information     Minded gives you secure access to your electronic health record. If you see a primary care provider, you can also send messages to your care team and make appointments. If you have questions, please call your primary care clinic.  If you do not have a primary care provider, please call [phone] and they will assist you.      Minded is an electronic gateway that provides easy, online access to your medical records. With Minded, you can request a clinic appointment, read your test results, renew a prescription or communicate with your care team.     To access your existing account, please contact your [facility] or call [phone] for assistance.        Care EveryWhere ID     This is your Care EveryWhere ID. This  could be used by other organizations to access your [place] medical records  [id number]        Your Vitals Were     Pulse Temperature Respirations Last Period Pulse Oximetry Breastfeeding?    94 98.1  F (36.7  C) (Oral) 16 11/04/2013 98% No    BMI (Body Mass Index)                   31.59 kg/m2            Blood Pressure from Last 3 Encounters:   12/07/17 132/87   09/06/17 135/86   05/23/17 137/90    Weight from Last 3 Encounters:   12/07/17 167 lb 3.2 oz (75.8 kg)   09/06/17 161 lb 3.2 oz (73.1 kg)   05/23/17 173 lb 6.4 oz (78.7 kg)              We Performed the Following     Wet Prep ([facility])          Today's Medication Changes          These changes are accurate as of: 12/7/17  9:30 AM.  If you have any questions, ask your nurse or doctor.               Start taking these medicines.        Dose/Directions    estradiol 0.1 MG/GM cream   Commonly known as:  ESTRACE VAGINAL   Used for:  Symptomatic menopausal or female climacteric states   Started by:  Olga Koenig APRN CNP        Dose:  2 g   Place 2 g vaginally twice a week   Quantity:  42.5 g   Refills:  6         Stop taking these medicines if you haven't already. Please contact your care team if you have questions.     capsaicin 0.075 % cream   Commonly known as:  ZOSTRIX   Stopped by:  Olga Koenig APRN CNP           cetirizine 10 MG tablet   Commonly known as:  zyrTEC   Stopped by:  Olga Koenig APRN CNP           conjugated estrogens cream   Commonly known as:  PREMARIN   Stopped by:  Olga Koenig APRN CNP           cyclobenzaprine 5 MG tablet   Commonly known as:  FLEXERIL   Stopped by:  Olga Koenig APRN CNP           pseudoePHEDrine 30 MG tablet   Commonly known as:  SUDAFED   Stopped by:  Olga Koenig APRN CNP                Where to get your medicines      These medications were sent to [facility] [place], [place] - [address] [facility] [address]     Phone:   [phone]     estradiol 0.1 MG/GM cream                Primary Care Provider Office Phone # Fax #    Olga Koenig, APRN [facility] [phone]       [address] [facility] [address]        Equal Access to Services     CHUY CAMACHO : Hadii adolph velásquez eloy Somariangelali, waaxda luqadaha, qaybta kaalmada adeegyada, brigitte felixin hayaarichard pearsonzulay santamaria frances welsh. So [facility] [phone].    ATENCIÓN: Si habla español, tiene a enriquez disposición servicios gratuitos de asistencia lingüística. Llame al [phone].    We comply with applicable federal civil rights laws and [place] laws. We do not discriminate on the basis of race, color, national origin, age, disability, sex, sexual orientation, or gender identity.            Thank you!     Thank you for choosing [facility]  for your care. Our goal is always to provide you with excellent care. Hearing back from our patients is one way we can continue to improve our services. Please take a few minutes to complete the written survey that you may receive in the mail after your visit with us. Thank you!             Your Updated Medication List - Protect others around you: Learn how to safely use, store and throw away your medicines at www.disposemymeds.org.          This list is accurate as of: 12/7/17  9:30 AM.  Always use your most recent med list.                   Brand Name Dispense Instructions for use Diagnosis    ACETAMINOPHEN 8 HOUR 650 MG 8 hour tablet   Generic drug:  acetaminophen     100 tablet    Take 1 tablet by mouth every 8 hours as needed    Left shoulder pain, Shoulder joint pain, left       estradiol 0.1 MG/GM cream    ESTRACE VAGINAL    42.5 g    Place 2 g vaginally twice a week    Symptomatic menopausal or female climacteric states       lisinopril-hydrochlorothiazide 20-12.5 MG per tablet    PRINZIDE/ZESTORETIC    90 tablet    Take 1 tablet by mouth daily    Benign essential hypertension

## 2017-12-07 NOTE — TELEPHONE ENCOUNTER
[facility] phone call message- medication clarification/question:    Full Medication Name: estradiol (ESTRACE VAGINAL) 0.1 MG/GM cream   Dose: Place 2 g vaginally twice a week    Question: Patient calling to advise that her insurance is currently pending/inactive and she is not able to  med prescribed by CLAUS Koenig today. Patient inquiring if provider can suggest alternative over-the-counter med that she can use instead.     Pharmacy confirmed as [place], [place] - [address]: Yes    OK to leave a message on voice mail? Yes    Primary language: English      needed? No    Call taken on December 7, 2017 at 12:47 PM by Autumn Arroyo

## 2017-12-07 NOTE — TELEPHONE ENCOUNTER
Unfortunately there isn't an OTC topical estrogen cream available.     Continue with Vaseline or Aquaphor to external vaginal tissue 2-3 times daily as needed.     -Piedad, CNP

## 2017-12-07 NOTE — TELEPHONE ENCOUNTER
RN called patient and relayed recommendations below. Patient verbalized understanding.    Jes Dickey RN

## 2017-12-07 NOTE — PROGRESS NOTES
"      HPI:       Chrissy Looney is a 57 year old who presents for the following  Patient presents with:  Vaginal Problem: burning around vaginal opening, ongoing 3 days     Vaginal Symptoms  Onset: 3 days ago    Description:  Vaginal Discharge: none   Itching (Pruritis): no  Burning sensation: Yes Details: worsens with urination  Odor: no    Accompanying Signs & Symptoms:    Pain with Urination:Yes. Details: she feels it's from urine going over the irritated area. \"Possibly a little frequency and occasional urgency\".   Abdominal Pain: no  Fever: no    History:     Sexually active: Yes Details: same partner for 35 years  New Partner: no  Possibility of Pregnancy:  No    Precipitating factors:   Recent Antibiotic Use: no       Alleviating factors:  Petroleum gel helps a little. She has not tried anything else.     She was prescribed Premarin cream last December for the same symptoms. She used it for about 1 month and then stopped since her symptoms were gone. She hasn't had any issues since and has not used the Premarin since. It is now . She is concerned about the cost of purchasing more estrogen cream.       Problem, Medication and Allergy Lists were reviewed and are current.  Patient is an established patient of this clinic.         Review of Systems:   Review of Systems   Constitutional: Negative for chills and fever.   Gastrointestinal: Negative for abdominal pain, constipation, diarrhea and nausea.   Genitourinary: Positive for dysuria (related to vaginal irritation), frequency and vaginal pain. Negative for vaginal bleeding and vaginal discharge.   Musculoskeletal: Negative for myalgias.             Physical Exam:   Patient Vitals for the past 24 hrs:   BP Temp Temp src Pulse Resp SpO2 Weight   17 0838 132/87 98.1  F (36.7  C) Oral 94 16 98 % 167 lb 3.2 oz (75.8 kg)     Body mass index is 31.59 kg/(m^2).  Vitals were reviewed and were normal     Physical Exam   Constitutional: She is oriented to " person, place, and time. She appears well-developed and well-nourished. No distress.   Genitourinary: Vagina normal. No erythema or bleeding in the vagina. No signs of injury around the vagina. No vaginal discharge found.   Genitourinary Comments: Cervical polyp   Neurological: She is alert and oriented to person, place, and time.   Psychiatric: She has a normal mood and affect.         Results:      Results from the last 24 hours  Results for orders placed or performed in visit on 12/07/17 (from the past 24 hour(s))   Wet Prep ([facility])   Result Value Ref Range    Yeast Wet Prep None none    Motile Trichomonas Wet Prep Negative Negative    Clue Cells Wet Prep None NONE    WBC WET PREP 10-25 2 - 5    Bacteria Wet Prep Few None    pH Wet Prep <4.5 3.8 - 4.5    Odor Wet Prep None NONE     Assessment and Plan     Chrissy was seen today for vaginal problem.    Diagnoses and all orders for this visit:    Vaginal irritation  -     Wet Prep ([facility])- negative for evidence of cause of symptoms  -     Likely secondary to hypoestrogen state    Symptomatic menopausal or female climacteric states  -     estradiol (ESTRACE VAGINAL) 0.1 MG/GM cream; Place 2 g vaginally twice a week    Continue with Aquaphor or vaseline to external vaginal area as needed.        Options for treatment and follow-up care were reviewed with the patient. Chrissy Looney  engaged in the decision making process and verbalized understanding of the options discussed and agreed with the final plan.      Return to clinic as needed if symptoms don't improve or worsen.       Edith Lopez RN, DNP-FNP student [facility]    History and physical examination done with student nurse practitioner.  Student acted as scribe for this encounter.  I agree with assessment and plan for this patient.     Olga Koenig, MAE CNP

## 2017-12-07 NOTE — PATIENT INSTRUCTIONS
Here is the plan from today's visit    1. Vaginal irritation  - Wet Prep ([facility])    Results for orders placed or performed in visit on 12/07/17   Wet Prep ([facility])   Result Value Ref Range    Yeast Wet Prep None none    Motile Trichomonas Wet Prep Negative Negative    Clue Cells Wet Prep None NONE    WBC WET PREP 10-25 2 - 5    Bacteria Wet Prep Few None    pH Wet Prep <4.5 3.8 - 4.5    Odor Wet Prep None NONE       2. Symptomatic menopausal or female climacteric states  - estradiol (ESTRACE VAGINAL) 0.1 MG/GM cream; Place 2 g vaginally twice a week  Dispense: 42.5 g; Refill: 6  Continue to use vaseline or Aquaphor to external vagina as needed.       Follow-up with no improvement or worsening of symptoms.       Thank you for coming to [facility] today.  Lab Testing:  **If you had lab testing today and your results are reassuring or normal they will be mailed to you or sent through CareShare within 7 days.   **If the lab tests need quick action we will call you with the results.  The phone number we will call with results is # [phone] (home) . If this is not the best number please call our clinic and change the number.  Medication Refills:  If you need any refills please call your pharmacy and they will contact us.   If you need to  your refill at a new pharmacy, please contact the new pharmacy directly. The new pharmacy will help you get your medications transferred faster.   Scheduling:  If you have any concerns about today's visit or wish to schedule another appointment please call our office during normal business hours [phone] (8-5:00 M-F)  If a referral was made to a [facility] and you don't get a call from central scheduling please call [phone].  If a Mammogram was ordered for you at [facility] call [phone] to schedule or change your appointment.  If you had an XRay/CT/Ultrasound/MRI ordered the number is [phone] to schedule or change your  radiology appointment.   Medical Concerns:  If you have urgent medical concerns please call [phone] at any time of the day.  If you have a medical emergency please call 531.

## 2018-02-15 ENCOUNTER — OFFICE VISIT (OUTPATIENT)
Dept: FAMILY MEDICINE | Facility: CLINIC | Age: 58
End: 2018-02-15
Payer: COMMERCIAL

## 2018-02-15 VITALS
DIASTOLIC BLOOD PRESSURE: 84 MMHG | RESPIRATION RATE: 14 BRPM | BODY MASS INDEX: 32.08 KG/M2 | SYSTOLIC BLOOD PRESSURE: 122 MMHG | TEMPERATURE: 97.9 F | OXYGEN SATURATION: 98 % | HEART RATE: 86 BPM | WEIGHT: 169.8 LBS

## 2018-02-15 DIAGNOSIS — Z48.02 ENCOUNTER FOR STAPLE REMOVAL: Primary | ICD-10-CM

## 2018-02-15 ASSESSMENT — ENCOUNTER SYMPTOMS
CONSTITUTIONAL NEGATIVE: 1
ROS SKIN COMMENTS: SEE HPI

## 2018-02-15 NOTE — PROGRESS NOTES
HPI:       Chrissy Looney is a 57 year old who presents for the following  Patient presents with:  Suture Removal    Was seen at [facility] ED on 2/11/18 for head injury s/p fall.  Had 2 staples placed.      No drainage, discharge, swelling or pain at site.        Problem, Medication and Allergy Lists were   reviewed and are current.     Patient Active Problem List    Diagnosis Date Noted     Chronic maxillary sinusitis 03/16/2016     Priority: Medium     Chronic maxillary and frontal sinusitis starting in February.  Unchanged with one round of amoxicillin.  Recommended daily flonase use.       Low back pain 11/04/2013     Priority: Medium     Diagnosis updated by automated process. Provider to review and confirm.       Prediabetes 01/24/2013     Priority: Medium     Essential hypertension 10/11/2012     Priority: Medium     Glucose intolerance (impaired glucose tolerance) 10/11/2012     Priority: Medium   ,     Current Outpatient Prescriptions   Medication Sig Dispense Refill     lisinopril-hydrochlorothiazide (PRINZIDE/ZESTORETIC) 20-12.5 MG per tablet Take 1 tablet by mouth daily 90 tablet 3     estradiol (ESTRACE VAGINAL) 0.1 MG/GM cream Place 2 g vaginally twice a week (Patient not taking: Reported on 2/15/2018) 42.5 g 6     ACETAMINOPHEN 8 HOUR 650 MG TABS Take 1 tablet by mouth every 8 hours as needed (Patient not taking: Reported on 2/15/2018) 100 tablet 1   ,   No Known Allergies  Patient is an established patient of this clinic.         Review of Systems:   Review of Systems   Constitutional: Negative.    Skin:        See HPI             Physical Exam:   Patient Vitals for the past 24 hrs:   BP Temp Pulse Resp SpO2 Weight   02/15/18 0802 122/84 97.9  F (36.6  C) 86 14 98 % 169 lb 12.8 oz (77 kg)     Body mass index is 32.08 kg/(m^2).  Vitals were reviewed and were normal     Physical Exam   HENT:   Head:             Assessment and Plan     Chrissy was seen today for suture removal.    Diagnoses and all orders  for this visit:    Encounter for staple removal    2 staples removed from head, well tolerated.       Follow-up in clinic as needed.     Options for treatment and follow-up care were reviewed with the patient. Chrissy Looney  engaged in the decision making process and verbalized understanding of the options discussed and agreed with the final plan.    MAE Mireles CNP

## 2018-02-15 NOTE — MR AVS SNAPSHOT
After Visit Summary   2/15/2018    Chrissy Looney    MRN: [id number]           Patient Information     Date Of Birth          1960        Visit Information        Provider Department      2/15/2018 8:00 AM Olga Koenig APRN CNP [facility]        Today's Diagnoses     Encounter for staple removal    -  1       Follow-ups after your visit        Who to contact     Please call your clinic at [phone] to:    Ask questions about your health    Make or cancel appointments    Discuss your medicines    Learn about your test results    Speak to your doctor            Additional Information About Your Visit        MyChart Information     [employer] gives you secure access to your electronic health record. If you see a primary care provider, you can also send messages to your care team and make appointments. If you have questions, please call your primary care clinic.  If you do not have a primary care provider, please call [phone] and they will assist you.      [employer] is an electronic gateway that provides easy, online access to your medical records. With [employer] you can request a clinic appointment, read your test results, renew a prescription or communicate with your care team.     To access your existing account, please contact your [facility] or call [phone] for assistance.        Care EveryWhere ID     This is your Care EveryWhere ID. This could be used by other organizations to access your [place] medical records  [id number]        Your Vitals Were     Pulse Temperature Respirations Last Period Pulse Oximetry BMI (Body Mass Index)    86 97.9  F (36.6  C) 14 11/04/2013 98% 32.08 kg/m2       Blood Pressure from Last 3 Encounters:   02/15/18 122/84   12/07/17 132/87   09/06/17 135/86    Weight from Last 3 Encounters:   02/15/18 169 lb 12.8 oz (77 kg)   12/07/17 167 lb 3.2 oz (75.8 kg)   09/06/17 161 lb 3.2 oz (73.1 kg)               Today, you had the following     No orders found for display       Primary Care Provider Office Phone # Fax #    Olga Koenig, APRN [facility] [phone]       [address]  [facility] [address]        Equal Access to Services     CHUY CAMACHO : Hadii aad ku hadmarie Sochelly, waaxda luqadaha, qaybta kaalmada adehelenda, brigitte sotorichard pearsonzulay santamaria frances welsh. So [facility] [phone].    ATENCIÓN: Si habla español, tiene a enriquez disposición servicios gratuitos de asistencia lingüística. Llame al [phone].    We comply with applicable federal civil rights laws and [place] laws. We do not discriminate on the basis of race, color, national origin, age, disability, sex, sexual orientation, or gender identity.            Thank you!     Thank you for choosing [facility]  for your care. Our goal is always to provide you with excellent care. Hearing back from our patients is one way we can continue to improve our services. Please take a few minutes to complete the written survey that you may receive in the mail after your visit with us. Thank you!             Your Updated Medication List - Protect others around you: Learn how to safely use, store and throw away your medicines at www.disposemymeds.org.          This list is accurate as of 2/15/18  8:20 AM.  Always use your most recent med list.                   Brand Name Dispense Instructions for use Diagnosis    ACETAMINOPHEN 8 HOUR 650 MG 8 hour tablet   Generic drug:  acetaminophen     100 tablet    Take 1 tablet by mouth every 8 hours as needed    Left shoulder pain, Shoulder joint pain, left       estradiol 0.1 MG/GM cream    ESTRACE VAGINAL    42.5 g    Place 2 g vaginally twice a week    Symptomatic menopausal or female climacteric states       lisinopril-hydrochlorothiazide 20-12.5 MG per tablet    PRINZIDE/ZESTORETIC    90 tablet    Take 1 tablet by mouth daily    Benign essential hypertension

## 2018-04-12 ENCOUNTER — OFFICE VISIT (OUTPATIENT)
Dept: FAMILY MEDICINE | Facility: CLINIC | Age: 58
End: 2018-04-12
Payer: COMMERCIAL

## 2018-04-12 VITALS
SYSTOLIC BLOOD PRESSURE: 132 MMHG | TEMPERATURE: 98.2 F | WEIGHT: 170 LBS | BODY MASS INDEX: 32.12 KG/M2 | HEART RATE: 104 BPM | OXYGEN SATURATION: 95 % | DIASTOLIC BLOOD PRESSURE: 87 MMHG | RESPIRATION RATE: 20 BRPM

## 2018-04-12 DIAGNOSIS — G89.29 CHRONIC RIGHT-SIDED LOW BACK PAIN WITHOUT SCIATICA: Primary | ICD-10-CM

## 2018-04-12 DIAGNOSIS — D17.30 LIPOMA OF SKIN AND SUBCUTANEOUS TISSUE: ICD-10-CM

## 2018-04-12 DIAGNOSIS — M54.50 CHRONIC RIGHT-SIDED LOW BACK PAIN WITHOUT SCIATICA: Primary | ICD-10-CM

## 2018-04-12 RX ORDER — CETIRIZINE HYDROCHLORIDE 10 MG/1
10 TABLET ORAL DAILY
COMMUNITY
End: 2018-05-25

## 2018-04-12 ASSESSMENT — ENCOUNTER SYMPTOMS
FEVER: 0
NUMBNESS: 0
WEAKNESS: 0
BACK PAIN: 1

## 2018-04-12 NOTE — PATIENT INSTRUCTIONS
Chrissy was seen today for back pain.    Diagnoses and all orders for this visit:    Chronic right-sided low back pain without sciatica  - Can try turmeric/curcumin with black pepper in it (has anti-imflammatory properties)- try looking at a co-op where you can help locating it  - Continue tylenol, icy hot, isidro shay, ice/heat as needed.  - Continue to increase walking, stretching daily. Try yoga!  - Continue vitamin D and calcium  - Continue the glucosamine/chondrointin for a few months. If it's helpful, continue taking it, but if not, discontinue.    Thank you for coming to [facility].  Lab Testing:  **If you had lab testing today and your results are reassuring or normal they will be mailed to you or sent through [employer] within 7 days.   **If the lab tests need quick action we will call you with the results.  The phone number we will call with results is # [phone] (home) . If this is not the best number please call our clinic and change the number.    Medication Refills:  If you need any refills please call your pharmacy and they will contact us.   If you need to  your refill at a new pharmacy, please contact the new pharmacy directly. The new pharmacy will help you get your medications transferred faster.     Scheduling:  If you have any concerns about today's visit or wish to schedule another appointment please call our office during normal business hours [phone] (8-5:00 M-F)    Medical Concerns:  If you have urgent medical concerns please call [phone] at any time of the day.  If you have a medical emergency please call [phone].  Again thank you for choosing [facility] and please let us know how we can best partner with you to improve you and your family's health.

## 2018-04-12 NOTE — MR AVS SNAPSHOT
After Visit Summary   4/12/2018    Chrissy Looney    MRN: [id number]           Patient Information     Date Of Birth          1960        Visit Information        Provider Department      4/12/2018 8:00 AM Olga Koenig APRN CNP [facility]        Today's Diagnoses     Chronic right-sided low back pain without sciatica    -  1      Care Instructions    Chrissy was seen today for back pain.    Diagnoses and all orders for this visit:    Chronic right-sided low back pain without sciatica  - Can try turmeric/curcumin with black pepper in it (has anti-imflammatory properties)- try looking at a co-op where you can help locating it  - Continue tylenol, icy hot, isidro shay, ice/heat as needed.  - Continue to increase walking, stretching daily. Try yoga!  - Continue vitamin D and calcium  - Continue the glucosamine/chondrointin for a few months. If it's helpful, continue taking it, but if not, discontinue.    Thank you for coming to [facility].  Lab Testing:  **If you had lab testing today and your results are reassuring or normal they will be mailed to you or sent through [employer] within 7 days.   **If the lab tests need quick action we will call you with the results.  The phone number we will call with results is # [phone] (home) . If this is not the best number please call our clinic and change the number.    Medication Refills:  If you need any refills please call your pharmacy and they will contact us.   If you need to  your refill at a new pharmacy, please contact the new pharmacy directly. The new pharmacy will help you get your medications transferred faster.     Scheduling:  If you have any concerns about today's visit or wish to schedule another appointment please call our office during normal business hours [phone] (8-5:00 M-F)    Medical Concerns:  If you have urgent medical concerns please call [phone] at any time of the day.  If  you have a medical emergency please call 911.  Again thank you for choosing [facility] and please let us know how we can best partner with you to improve you and your family's health.                Follow-ups after your visit        Who to contact     Please call your clinic at [phone] to:    Ask questions about your health    Make or cancel appointments    Discuss your medicines    Learn about your test results    Speak to your doctor            Additional Information About Your Visit        [employer] Information     [employer] gives you secure access to your electronic health record. If you see a primary care provider, you can also send messages to your care team and make appointments. If you have questions, please call your primary care clinic.  If you do not have a primary care provider, please call [phone] and they will assist you.      [employer] is an electronic gateway that provides easy, online access to your medical records. With [employer], you can request a clinic appointment, read your test results, renew a prescription or communicate with your care team.     To access your existing account, please contact your [facility] or call [phone] for assistance.        Care EveryWhere ID     This is your Care EveryWhere ID. This could be used by other organizations to access your [place] medical records  [id number]        Your Vitals Were     Pulse Temperature Respirations Last Period Pulse Oximetry Breastfeeding?    104 98.2  F (36.8  C) (Oral) 20 11/04/2013 95% No    BMI (Body Mass Index)                   32.12 kg/m2            Blood Pressure from Last 3 Encounters:   04/12/18 132/87   02/15/18 122/84   12/07/17 132/87    Weight from Last 3 Encounters:   04/12/18 170 lb (77.1 kg)   02/15/18 169 lb 12.8 oz (77 kg)   12/07/17 167 lb 3.2 oz (75.8 kg)              Today, you had the following     No orders found for display       Primary Care Provider  Office Phone # Fax #    Olga Koenig, APRN [facility] [phone]       [address] [facility] [address]        Equal Access to Services     CHUY CAMACHO : Hadii aad ku hadyfnskylar Butcher, efrain ednaclarice, harmonyta kamadeleineda andrae, brigitte sotorichard pearsonzulay santamaria frances welsh. So [facility] [phone].    ATENCIÓN: Si habla español, tiene a enriquez disposición servicios gratuitos de asistencia lingüística. Llame al [phone].    We comply with applicable federal civil rights laws and [place] laws. We do not discriminate on the basis of race, color, national origin, age, disability, sex, sexual orientation, or gender identity.            Thank you!     Thank you for choosing [facility]  for your care. Our goal is always to provide you with excellent care. Hearing back from our patients is one way we can continue to improve our services. Please take a few minutes to complete the written survey that you may receive in the mail after your visit with us. Thank you!             Your Updated Medication List - Protect others around you: Learn how to safely use, store and throw away your medicines at www.disposemymeds.org.          This list is accurate as of 4/12/18  8:24 AM.  Always use your most recent med list.                   Brand Name Dispense Instructions for use Diagnosis    ACETAMINOPHEN 8 HOUR 650 MG 8 hour tablet   Generic drug:  acetaminophen     100 tablet    Take 1 tablet by mouth every 8 hours as needed    Left shoulder pain, Shoulder joint pain, left       cetirizine 10 MG tablet    zyrTEC     Take 10 mg by mouth daily        estradiol 0.1 MG/GM cream    ESTRACE VAGINAL    42.5 g    Place 2 g vaginally twice a week    Symptomatic menopausal or female climacteric states       lisinopril-hydrochlorothiazide 20-12.5 MG per tablet    PRINZIDE/ZESTORETIC    90 tablet    Take 1 tablet by mouth daily    Benign essential hypertension

## 2018-04-12 NOTE — PROGRESS NOTES
HPI:       Chrissy Looney is a 58 year old who presents for the following  Patient presents with:  Back Pain: Arthritis lower back         Arthritis Follow Up,  Diagnosis:osteoarthritis     Update since last visit stable.     She states she has had an area of fatty tissue over low back for years. She's now had a couple weeks of increased pain over right low back that's not aggravated by movement. Explains pain as a constant, dull ache. It doesn't seem to wax or wane during the day. The pain hasn't stopped her from doing her normal activities. Over the years, pain has not been progressive or improved. No strength change or weakness. Some achinging on hamstrings when walking.    Tried PT [id number]- not interested in this today. She has print outs of stretches from PT that she still does.  CBD cream- wondering if this an option? She has family members with back pain who have found decreased pain with this.  Started glucosamine/chondroitin on Monday.    Adherence and Exercise  Medication side effects: not applicable  Exercise: walking and stretching at home, would not like PT at this time      Problem, Medication and Allergy Lists were reviewed and are current.  Patient is an established patient of this clinic.         Review of Systems:   Review of Systems   Constitutional: Negative for fever.   Musculoskeletal: Positive for back pain (low).   Neurological: Negative for weakness and numbness.             Physical Exam:   Patient Vitals for the past 24 hrs:   BP Temp Temp src Pulse Resp SpO2 Weight   04/12/18 0754 132/87 98.2  F (36.8  C) Oral 104 20 95 % 170 lb (77.1 kg)     Body mass index is 32.12 kg/(m^2).  Vitals were reviewed and were normal     Physical Exam   Constitutional: She is oriented to person, place, and time. She appears well-developed and well-nourished. No distress.   Cardiovascular: Normal rate, regular rhythm and normal heart sounds.  Exam reveals no gallop and no friction rub.    No murmur  heard.  Pulmonary/Chest: Effort normal and breath sounds normal. No respiratory distress.   Musculoskeletal: Normal range of motion. She exhibits tenderness (point tenderness over right lumbo-sacral area, palpable lipoma 4-5cm in diameter. No spinal tenderness.). She exhibits no edema.   Neurological: She is alert and oriented to person, place, and time.   Psychiatric: She has a normal mood and affect.         Results:     None    Assessment and Plan     Chrissy was seen today for back pain.    Diagnoses and all orders for this visit:    Chronic right-sided low back pain without sciatica  Lipoma of skin and subcutaneous tissue  - US 2013 showing lipoma on right lumbar area and Xray low back 2014 showing mild degenerative changes. No imaging since.   - Discussed the option of meeting with a surgeon if pain is progressive or limiting her activities. She is not interested in this at this time.  - Can try turmeric/curcumin with black pepper in it (has anti-imflammatory properties)- try looking at a co-op where you can get assistance.  - Continue tylenol, icy hot, bengay, ice/heat as needed.  - Continue to increase walking, stretching daily. Try yoga!  - Continue vitamin D and calcium supplements.    - Continue the glucosamine/chondrointin for a few months. If it's helpful, continue taking it, but if not, discontinue.      Over 50% of 25 minute appointment was spent on counseling and education regarding above issues.     Options for treatment and follow-up care were reviewed with the patient. Chrissy Looney  engaged in the decision making process and verbalized understanding of the options discussed and agreed with the final plan.    Edith Lopez RN, DNP-FNP student [facility]    History and physical examination done with student nurse practitioner.  Student acted as scribe for this encounter.  I agree with assessment and plan for this patient.     Olga Koenig, MAE CNP

## 2018-05-25 ENCOUNTER — OFFICE VISIT (OUTPATIENT)
Dept: FAMILY MEDICINE | Facility: CLINIC | Age: 58
End: 2018-05-25
Payer: COMMERCIAL

## 2018-05-25 VITALS
HEART RATE: 84 BPM | WEIGHT: 167 LBS | SYSTOLIC BLOOD PRESSURE: 120 MMHG | DIASTOLIC BLOOD PRESSURE: 83 MMHG | RESPIRATION RATE: 16 BRPM | TEMPERATURE: 98.2 F | OXYGEN SATURATION: 98 % | BODY MASS INDEX: 31.55 KG/M2

## 2018-05-25 DIAGNOSIS — M77.02 MEDIAL EPICONDYLITIS OF ELBOW, LEFT: Primary | ICD-10-CM

## 2018-05-25 DIAGNOSIS — J30.89 ENVIRONMENTAL AND SEASONAL ALLERGIES: ICD-10-CM

## 2018-05-25 DIAGNOSIS — I10 BENIGN ESSENTIAL HYPERTENSION: ICD-10-CM

## 2018-05-25 RX ORDER — CETIRIZINE HYDROCHLORIDE 10 MG/1
10 TABLET ORAL DAILY
Qty: 90 TABLET | Refills: 3 | Status: SHIPPED | OUTPATIENT
Start: 2018-05-25 | End: 2019-05-23

## 2018-05-25 RX ORDER — NAPROXEN 500 MG/1
500 TABLET ORAL 2 TIMES DAILY PRN
Qty: 60 TABLET | Refills: 1 | Status: SHIPPED | OUTPATIENT
Start: 2018-05-25 | End: 2018-07-19

## 2018-05-25 RX ORDER — LISINOPRIL AND HYDROCHLOROTHIAZIDE 12.5; 2 MG/1; MG/1
1 TABLET ORAL DAILY
Qty: 90 TABLET | Refills: 3 | Status: SHIPPED | OUTPATIENT
Start: 2018-05-25 | End: 2019-05-23

## 2018-05-25 ASSESSMENT — ENCOUNTER SYMPTOMS
CONSTITUTIONAL NEGATIVE: 1
CARDIOVASCULAR NEGATIVE: 1
RESPIRATORY NEGATIVE: 1

## 2018-05-25 NOTE — PATIENT INSTRUCTIONS
Here is the plan from today's visit    1. Benign essential hypertension  - lisinopril-hydrochlorothiazide (PRINZIDE/ZESTORETIC) 20-12.5 MG per tablet; Take 1 tablet by mouth daily  Dispense: 90 tablet; Refill: 3    2. Environmental and seasonal allergies  - cetirizine (ZYRTEC) 10 MG tablet; Take 1 tablet (10 mg) by mouth daily  Dispense: 90 tablet; Refill: 3    3. Medial epicondylitis of elbow, left  - naproxen (NAPROSYN) 500 MG tablet; Take 1 tablet (500 mg) by mouth 2 times daily as needed for moderate pain  Dispense: 60 tablet; Refill: 1    Follow-up with no improvement or worsening of symptoms.     Thank you for coming to [facility] today.  Lab Testing:  **If you had lab testing today and your results are reassuring or normal they will be mailed to you or sent through [employer] within 7 days.   **If the lab tests need quick action we will call you with the results.  The phone number we will call with results is # [phone] (home) . If this is not the best number please call our clinic and change the number.  Medication Refills:  If you need any refills please call your pharmacy and they will contact us.   If you need to  your refill at a new pharmacy, please contact the new pharmacy directly. The new pharmacy will help you get your medications transferred faster.   Scheduling:  If you have any concerns about today's visit or wish to schedule another appointment please call our office during normal business hours [phone] (8-5:00 M-F)  If a referral was made to a [facility] and you don't get a call from central scheduling please call [phone].  If a Mammogram was ordered for you at [facility] call [phone] to schedule or change your appointment.  If you had an XRay/CT/Ultrasound/MRI ordered the number is [phone] to schedule or change your radiology appointment.   Medical Concerns:  If you have urgent medical concerns please call [phone] at any time  of the day.  If you have a medical emergency please call 911.    Understanding Medial Epicondylitis    Several muscles attach to the arm at the elbow joint. The tough bands of tissue that attach muscle to bones are called tendons. The bone in the upper arm has knobs on the farthest end called epicondyles. Tendons attach some arm muscles to these knobs. The tissues in this area can become irritated.  Epicondylitis is the medical term for a painful elbow over the epicondyle. Medial refers to the inner side of the elbow. Medial epicondylitis is sometimes called  golfer s elbow.      How to say it  FAUSTINO-sera-rocio [id number]-[id number]-TEOV-dye-lie-tis   Causes of medial epicondylitis  A painful inner elbow may be caused by:    Using an elbow or hand the same way over and over    Using poor form or too much force in a sport such as golf, tennis, or baseball    Lifting too heavy a weight    Other injuries to the arm or elbow  Symptoms of medial epicondylitis    Pain or tenderness on the inside of the elbow that may travel down the forearm    Pain when moving the wrist    Pain or weakness when gripping something    A crackling sound or grating feeling when moving the elbow  Treatment for medial epicondylitis  Treatments may include:    Avoiding or changing the action that caused the problem. This helps prevent irritating the tissues more.    Prescription or over-the-counter pain medicines. These help reduce inflammation, swelling, and pain.    Cold or heat packs. These help reduce pain and swelling.    Stretching and other exercises. These improve flexibility and strength.    Physical therapy. This may include exercises or other treatments.    Injections of medicine. This may relieve symptoms.  If other treatments do not relieve symptoms, you may need surgery.  Possible complications  If you don t give your elbow time to heal, symptoms may return or get worse. Follow your healthcare provider s instructions on resting and treating your  elbow.     When to call your healthcare provider  Call your healthcare provider right away if you have any of these:    Fever of 100.4 F (38 C) or higher, or as directed    Redness, swelling, or warmth that gets worse    Symptoms that don t get better with prescribed medicines, or get worse    New symptoms   Date Last Reviewed: 3/10/2016    6677-4253 [employer]. [address] [place] [address]. All rights reserved. This information is not intended as a substitute for professional medical care. Always follow your healthcare professional's instructions.

## 2018-05-25 NOTE — PROGRESS NOTES
HPI:       Chrissy Looney is a 58 year old who presents for the following  Patient presents with:  Elbow left: ongoing pain for 3 weeks, no known injury   Refill Request: cetrizine and lisinopril    1.  Onset of left elbow pain three weeks ago.  No injury or trauma.  Aggravated with squeezing left hand, holding her grandson, sweeping.    Has tried Ibuprofen, ice application and brace without improvement.     2.    Hypertension Follow-up      Outpatient blood pressures are not being checked.    Chest Pain? :No     Low Salt Diet: no added salt    Daily NSAID Use?No     Did patient take their HTN pills today/last night as usual?  Yes       Adherence and Exercise  Medication side effects: no  How often is a medication missed? Never    Problem, Medication and Allergy Lists were   reviewed and are current.     Patient Active Problem List    Diagnosis Date Noted     Chronic maxillary sinusitis 03/16/2016     Priority: Medium     Chronic maxillary and frontal sinusitis starting in February.  Unchanged with one round of amoxicillin.  Recommended daily flonase use.       Low back pain 11/04/2013     Priority: Medium     Diagnosis updated by automated process. Provider to review and confirm.       Prediabetes 01/24/2013     Priority: Medium     Essential hypertension 10/11/2012     Priority: Medium     Glucose intolerance (impaired glucose tolerance) 10/11/2012     Priority: Medium   ,     Current Outpatient Prescriptions   Medication Sig Dispense Refill     ACETAMINOPHEN 8 HOUR 650 MG TABS Take 1 tablet by mouth every 8 hours as needed 100 tablet 1     cetirizine (ZYRTEC) 10 MG tablet Take 1 tablet (10 mg) by mouth daily 90 tablet 3     estradiol (ESTRACE VAGINAL) 0.1 MG/GM cream Place 2 g vaginally twice a week (Patient not taking: Reported on 2/15/2018) 42.5 g 6     lisinopril-hydrochlorothiazide (PRINZIDE/ZESTORETIC) 20-12.5 MG per tablet Take 1 tablet by mouth daily 90 tablet 3     naproxen (NAPROSYN) 500 MG tablet  Take 1 tablet (500 mg) by mouth 2 times daily as needed for moderate pain 60 tablet 1     [DISCONTINUED] lisinopril-hydrochlorothiazide (PRINZIDE/ZESTORETIC) 20-12.5 MG per tablet Take 1 tablet by mouth daily 90 tablet 3   ,   No Known Allergies  Patient is an established patient of this clinic.         Review of Systems:   Review of Systems   Constitutional: Negative.    Respiratory: Negative.    Cardiovascular: Negative.    Musculoskeletal:        Left elbow pain               Physical Exam:   Patient Vitals for the past 24 hrs:   BP Temp Temp src Pulse Resp SpO2 Weight   05/25/18 0756 120/83 98.2  F (36.8  C) Oral 84 16 98 % 167 lb (75.8 kg)     Body mass index is 31.55 kg/(m^2).  Vitals were reviewed and were normal     Physical Exam   Constitutional: She is oriented to person, place, and time. She appears well-nourished. No distress.   Cardiovascular: Normal rate and regular rhythm.    Pulmonary/Chest: Effort normal and breath sounds normal.   Musculoskeletal: She exhibits no edema.        Left elbow: She exhibits normal range of motion and no swelling. Tenderness found. Medial epicondyle tenderness noted.   Neurological: She is alert and oriented to person, place, and time.   Psychiatric: She has a normal mood and affect.         Assessment and Plan     Chrissy was seen today for elbow left and refill request.    Diagnoses and all orders for this visit:    Medial epicondylitis of elbow, left  -     naproxen (NAPROSYN) 500 MG tablet; Take 1 tablet (500 mg) by mouth 2 times daily as needed for moderate pain - Schedule for 5 days, then use as needed.   -     Ice application 3-4 times daily for 15 minutes.  -     Activity modification.   -     Follow-up with no improvement or worsening of symptoms.     Benign essential hypertension  -     lisinopril-hydrochlorothiazide (PRINZIDE/ZESTORETIC) 20-12.5 MG per tablet; Take 1 tablet by mouth daily    Environmental and seasonal allergies  -     cetirizine (ZYRTEC) 10 MG  tablet; Take 1 tablet (10 mg) by mouth daily      Options for treatment and follow-up care were reviewed with the patient. Chrissy Looney  engaged in the decision making process and verbalized understanding of the options discussed and agreed with the final plan.    MAE Mireles CNP

## 2018-05-25 NOTE — MR AVS SNAPSHOT
After Visit Summary   5/25/2018    Chrissy Looney    MRN: [id number]           Patient Information     Date Of Birth          1960        Visit Information        Provider Department      5/25/2018 8:00 AM Olga Koenig APRN CNP [facility]        Today's Diagnoses     Medial epicondylitis of elbow, left    -  1    Benign essential hypertension        Environmental and seasonal allergies          Care Instructions    Here is the plan from today's visit    1. Benign essential hypertension  - lisinopril-hydrochlorothiazide (PRINZIDE/ZESTORETIC) 20-12.5 MG per tablet; Take 1 tablet by mouth daily  Dispense: 90 tablet; Refill: 3    2. Environmental and seasonal allergies  - cetirizine (ZYRTEC) 10 MG tablet; Take 1 tablet (10 mg) by mouth daily  Dispense: 90 tablet; Refill: 3    3. Medial epicondylitis of elbow, left  - naproxen (NAPROSYN) 500 MG tablet; Take 1 tablet (500 mg) by mouth 2 times daily as needed for moderate pain  Dispense: 60 tablet; Refill: 1    Follow-up with no improvement or worsening of symptoms.     Thank you for coming to [facility] today.  Lab Testing:  **If you had lab testing today and your results are reassuring or normal they will be mailed to you or sent through [employer] within 7 days.   **If the lab tests need quick action we will call you with the results.  The phone number we will call with results is # [phone] (home) . If this is not the best number please call our clinic and change the number.  Medication Refills:  If you need any refills please call your pharmacy and they will contact us.   If you need to  your refill at a new pharmacy, please contact the new pharmacy directly. The new pharmacy will help you get your medications transferred faster.   Scheduling:  If you have any concerns about today's visit or wish to schedule another appointment please call our office during normal business hours [phone] (8-5:00 M-F)  If a  referral was made to a [facility] and you don't get a call from central scheduling please call [phone].  If a Mammogram was ordered for you at [facility] call [phone] to schedule or change your appointment.  If you had an XRay/CT/Ultrasound/MRI ordered the number is [phone] to schedule or change your radiology appointment.   Medical Concerns:  If you have urgent medical concerns please call [phone] at any time of the day.  If you have a medical emergency please call [phone].    Understanding Medial Epicondylitis    Several muscles attach to the arm at the elbow joint. The tough bands of tissue that attach muscle to bones are called tendons. The bone in the upper arm has knobs on the farthest end called epicondyles. Tendons attach some arm muscles to these knobs. The tissues in this area can become irritated.  Epicondylitis is the medical term for a painful elbow over the epicondyle. Medial refers to the inner side of the elbow. Medial epicondylitis is sometimes called  golfer s elbow.      How to say it  FAUSTINO-sera-rocio [id number]-[id number]   Causes of medial epicondylitis  A painful inner elbow may be caused by:    Using an elbow or hand the same way over and over    Using poor form or too much force in a sport such as golf, tennis, or baseball    Lifting too heavy a weight    Other injuries to the arm or elbow  Symptoms of medial epicondylitis    Pain or tenderness on the inside of the elbow that may travel down the forearm    Pain when moving the wrist    Pain or weakness when gripping something    A crackling sound or grating feeling when moving the elbow  Treatment for medial epicondylitis  Treatments may include:    Avoiding or changing the action that caused the problem. This helps prevent irritating the tissues more.    Prescription or over-the-counter pain medicines. These help reduce inflammation, swelling, and pain.    Cold or heat packs. These help reduce  pain and swelling.    Stretching and other exercises. These improve flexibility and strength.    Physical therapy. This may include exercises or other treatments.    Injections of medicine. This may relieve symptoms.  If other treatments do not relieve symptoms, you may need surgery.  Possible complications  If you don t give your elbow time to heal, symptoms may return or get worse. Follow your healthcare provider s instructions on resting and treating your elbow.     When to call your healthcare provider  Call your healthcare provider right away if you have any of these:    Fever of 100.4 F (38 C) or higher, or as directed    Redness, swelling, or warmth that gets worse    Symptoms that don t get better with prescribed medicines, or get worse    New symptoms   Date Last Reviewed: 3/10/2016    0281-1788 [employer]. [address] [place] [address], [place] [address]. All rights reserved. This information is not intended as a substitute for professional medical care. Always follow your healthcare professional's instructions.                Follow-ups after your visit        Who to contact     Please call your clinic at [phone] to:    Ask questions about your health    Make or cancel appointments    Discuss your medicines    Learn about your test results    Speak to your doctor            Additional Information About Your Visit        [employer] Information     [employer] gives you secure access to your electronic health record. If you see a primary care provider, you can also send messages to your care team and make appointments. If you have questions, please call your primary care clinic.  If you do not have a primary care provider, please call [phone] and they will assist you.      [employer] is an electronic gateway that provides easy, online access to your medical records. With [employer], you can request a clinic appointment, read your test results, renew a prescription or communicate with your care team.      To access your existing account, please contact your [facility] or call [phone] for assistance.        Care EveryWhere ID     This is your Care EveryWhere ID. This could be used by other organizations to access your [facility] medical records  [id number]        Your Vitals Were     Pulse Temperature Respirations Last Period Pulse Oximetry Breastfeeding?    84 98.2  F (36.8  C) (Oral) 16 11/04/2013 98% No    BMI (Body Mass Index)                   31.55 kg/m2            Blood Pressure from Last 3 Encounters:   05/25/18 120/83   04/12/18 132/87   02/15/18 122/84    Weight from Last 3 Encounters:   05/25/18 167 lb (75.8 kg)   04/12/18 170 lb (77.1 kg)   02/15/18 169 lb 12.8 oz (77 kg)              Today, you had the following     No orders found for display         Today's Medication Changes          These changes are accurate as of 5/25/18  8:20 AM.  If you have any questions, ask your nurse or doctor.               Start taking these medicines.        Dose/Directions    naproxen 500 MG tablet   Commonly known as:  NAPROSYN   Used for:  Medial epicondylitis of elbow, left   Started by:  Olga Koenig APRN CNP        Dose:  500 mg   Take 1 tablet (500 mg) by mouth 2 times daily as needed for moderate pain   Quantity:  60 tablet   Refills:  1            Where to get your medicines      These medications were sent to [facility] [place], [place] - [address] [place] [address]     Phone:  [phone]     cetirizine 10 MG tablet    lisinopril-hydrochlorothiazide 20-12.5 MG per tablet    naproxen 500 MG tablet                Primary Care Provider Office Phone # Fax #    MAE Mireles [phone]       [address]  [facility] [address]        Equal Access to Services     CHUY CAMACHO AH: Colleen Butcher, wadannida janeth, qaybta kaalmarty abebe, brigitte welsh. So  [facility] [phone].    ATENCIÓN: Si ruben valadez, tiene a enriquez disposición servicios gratuitos de asistencia lingüística. Eriberto lazo [phone].    We comply with applicable federal civil rights laws and [place] laws. We do not discriminate on the basis of race, color, national origin, age, disability, sex, sexual orientation, or gender identity.            Thank you!     Thank you for choosing [facility]  for your care. Our goal is always to provide you with excellent care. Hearing back from our patients is one way we can continue to improve our services. Please take a few minutes to complete the written survey that you may receive in the mail after your visit with us. Thank you!             Your Updated Medication List - Protect others around you: Learn how to safely use, store and throw away your medicines at www.disposemymeds.org.          This list is accurate as of 5/25/18  8:20 AM.  Always use your most recent med list.                   Brand Name Dispense Instructions for use Diagnosis    ACETAMINOPHEN 8 HOUR 650 MG 8 hour tablet   Generic drug:  acetaminophen     100 tablet    Take 1 tablet by mouth every 8 hours as needed    Left shoulder pain, Shoulder joint pain, left       cetirizine 10 MG tablet    zyrTEC    90 tablet    Take 1 tablet (10 mg) by mouth daily    Environmental and seasonal allergies       estradiol 0.1 MG/GM cream    ESTRACE VAGINAL    42.5 g    Place 2 g vaginally twice a week    Symptomatic menopausal or female climacteric states       lisinopril-hydrochlorothiazide 20-12.5 MG per tablet    PRINZIDE/ZESTORETIC    90 tablet    Take 1 tablet by mouth daily    Benign essential hypertension       naproxen 500 MG tablet    NAPROSYN    60 tablet    Take 1 tablet (500 mg) by mouth 2 times daily as needed for moderate pain    Medial epicondylitis of elbow, left

## 2018-06-05 ENCOUNTER — TELEPHONE (OUTPATIENT)
Dept: AUDIOLOGY | Facility: CLINIC | Age: 58
End: 2018-06-05

## 2018-06-05 NOTE — TELEPHONE ENCOUNTER
[facility] Center    Phone Message    May a detailed message be left on voicemail: yes    Reason for Call: Other: Pt mailed in her hearing aid a few weeks ago for cleaning and she is wondering what the update is on them? When will she receive them back? Please give the pt a follow up call.     Action Taken: Message routed to:  [facility] [place]

## 2018-07-19 ENCOUNTER — OFFICE VISIT (OUTPATIENT)
Dept: FAMILY MEDICINE | Facility: CLINIC | Age: 58
End: 2018-07-19
Payer: COMMERCIAL

## 2018-07-19 VITALS
HEART RATE: 92 BPM | RESPIRATION RATE: 16 BRPM | OXYGEN SATURATION: 96 % | TEMPERATURE: 98.1 F | DIASTOLIC BLOOD PRESSURE: 88 MMHG | SYSTOLIC BLOOD PRESSURE: 136 MMHG | BODY MASS INDEX: 31.29 KG/M2 | WEIGHT: 165.6 LBS

## 2018-07-19 DIAGNOSIS — M77.02 MEDIAL EPICONDYLITIS OF ELBOW, LEFT: Primary | ICD-10-CM

## 2018-07-19 DIAGNOSIS — R73.03 PREDIABETES: ICD-10-CM

## 2018-07-19 DIAGNOSIS — I10 ESSENTIAL HYPERTENSION: ICD-10-CM

## 2018-07-19 DIAGNOSIS — Z00.00 PREVENTATIVE HEALTH CARE: ICD-10-CM

## 2018-07-19 LAB
ANION GAP SERPL CALCULATED.3IONS-SCNC: 7 MMOL/L (ref 3–14)
BUN SERPL-MCNC: 13 MG/DL (ref 7–30)
CALCIUM SERPL-MCNC: 9.2 MG/DL (ref 8.5–10.1)
CHLORIDE SERPL-SCNC: 106 MMOL/L (ref 94–109)
CO2 SERPL-SCNC: 28 MMOL/L (ref 20–32)
CREAT SERPL-MCNC: 0.73 MG/DL (ref 0.52–1.04)
GFR SERPL CREATININE-BSD FRML MDRD: 82 ML/MIN/1.7M2
GLUCOSE SERPL-MCNC: 98 MG/DL (ref 70–99)
HBA1C MFR BLD: 5.9 % (ref 4.1–5.7)
POTASSIUM SERPL-SCNC: 3.7 MMOL/L (ref 3.4–5.3)
SODIUM SERPL-SCNC: 140 MMOL/L (ref 133–144)

## 2018-07-19 ASSESSMENT — ENCOUNTER SYMPTOMS
CARDIOVASCULAR NEGATIVE: 1
RESPIRATORY NEGATIVE: 1
CONSTITUTIONAL NEGATIVE: 1

## 2018-07-19 NOTE — MR AVS SNAPSHOT
After Visit Summary   7/19/2018    Chrissy Looney    MRN: [id number]           Patient Information     Date Of Birth          1960        Visit Information        Provider Department      7/19/2018 8:20 AM Olga Koenig APRN CNP [facility]        Today's Diagnoses     Medial epicondylitis of elbow, left    -  1    Essential hypertension        Prediabetes        Preventative health care          Care Instructions    Here is the plan from today's visit    1. Essential hypertension  - Basic metabolic panel    2. Prediabetes  - Hemoglobin A1c ([facility])    3. Medial epicondylitis of elbow, left  - Sports [facility] Clinic[facility] INTERNAL REFERRAL    4. Preventative health care  - Screening Mammogram Digital Bilateral; Future    Follow-up with no improvement or worsening of symptoms.     Thank you for coming to [facility] today.  Lab Testing:  **If you had lab testing today and your results are reassuring or normal they will be mailed to you or sent through [employer] [place] within 7 days.   **If the lab tests need quick action we will call you with the results.  The phone number we will call with results is # [phone] (home) . If this is not the best number please call our clinic and change the number.  Medication Refills:  If you need any refills please call your pharmacy and they will contact us.   If you need to  your refill at a new pharmacy, please contact the new pharmacy directly. The new pharmacy will help you get your medications transferred faster.   Scheduling:  If you have any concerns about today's visit or wish to schedule another appointment please call our office during normal business hours [phone] (8-5:00 M-F)  If a referral was made to a [facility] and you don't get a call from central scheduling please call [phone].  If a Mammogram was ordered for you at [facility] call [phone] to schedule or change your  appointment.  If you had an XRay/CT/Ultrasound/MRI ordered the number is [phone] to schedule or change your radiology appointment.   Medical Concerns:  If you have urgent medical concerns please call [phone] at any time of the day.  If you have a medical emergency please call 911.            Follow-ups after your visit        Additional Services     [facility] INTERNAL REFERRAL       Reason: left medial epicondylitis, onset 6-8 weeks ago  Urgency of Appointment: Next Available  Length of Problem: Sub-Acute (3-12 weeks since onset).  What are you requesting be done? Management Recommendations and Possible Injection                  Future tests that were ordered for you today     Open Future Orders        Priority Expected Expires Ordered    Screening Mammogram Digital Bilateral Routine  7/19/2019 7/19/2018            Who to contact     Please call your clinic at [phone] to:    Ask questions about your health    Make or cancel appointments    Discuss your medicines    Learn about your test results    Speak to your doctor            Additional Information About Your Visit        [employer] Information     [employer] gives you secure access to your electronic health record. If you see a primary care provider, you can also send messages to your care team and make appointments. If you have questions, please call your primary care clinic.  If you do not have a primary care provider, please call [phone] and they will assist you.      [employer] is an electronic gateway that provides easy, online access to your medical records. With [employer], you can request a clinic appointment, read your test results, renew a prescription or communicate with your care team.     To access your existing account, please contact your [facility] or call [phone] for assistance.        Care EveryWhere ID     This is your Care EveryWhere ID. This could be used by other  organizations to access your [facility] medical records  [id number]        Your Vitals Were     Pulse Temperature Respirations Last Period Pulse Oximetry BMI (Body Mass Index)    92 98.1  F (36.7  C) (Oral) 16 11/04/2013 96% 31.29 kg/m2       Blood Pressure from Last 3 Encounters:   07/19/18 136/88   05/25/18 120/83   04/12/18 132/87    Weight from Last 3 Encounters:   07/19/18 165 lb 9.6 oz (75.1 kg)   05/25/18 167 lb (75.8 kg)   04/12/18 170 lb (77.1 kg)              We Performed the Following     Basic metabolic panel     Hemoglobin A1c ([facility])     [facility] INTERNAL REFERRAL          Today's Medication Changes          These changes are accurate as of 7/19/18  8:46 AM.  If you have any questions, ask your nurse or doctor.               Stop taking these medicines if you haven't already. Please contact your care team if you have questions.     estradiol 0.1 MG/GM cream   Commonly known as:  ESTRACE VAGINAL   Stopped by:  Olga Koenig APRN CNP           naproxen 500 MG tablet   Commonly known as:  NAPROSYN   Stopped by:  Olga Koenig APRN CNP                    Primary Care Provider Office Phone # Fax #    MAE Mireles [phone]       [address] [facility] [address]        Equal Access to Services     CHUY CAMACHO AH: Hadgilmer lyons Sochelly, waaxda luqadaha, qaybta kaalmada brigitte abebe. So [facility] [phone].    ATENCIÓN: Si habla español, tiene a enriquez disposición servicios gratuitos de asistencia lingüística. Eriberto al [phone].    We comply with applicable federal civil rights laws and [place] laws. We do not discriminate on the basis of race, color, national origin, age, disability, sex, sexual orientation, or gender identity.            Thank you!     Thank you for choosing [facility]  for your care. Our goal is always to provide you with excellent care. Hearing  back from our patients is one way we can continue to improve our services. Please take a few minutes to complete the written survey that you may receive in the mail after your visit with us. Thank you!             Your Updated Medication List - Protect others around you: Learn how to safely use, store and throw away your medicines at www.disposemymeds.org.          This list is accurate as of 7/19/18  8:46 AM.  Always use your most recent med list.                   Brand Name Dispense Instructions for use Diagnosis    ACETAMINOPHEN 8 HOUR 650 MG 8 hour tablet   Generic drug:  acetaminophen     100 tablet    Take 1 tablet by mouth every 8 hours as needed    Left shoulder pain, Shoulder joint pain, left       cetirizine 10 MG tablet    zyrTEC    90 tablet    Take 1 tablet (10 mg) by mouth daily    Environmental and seasonal allergies       lisinopril-hydrochlorothiazide 20-12.5 MG per tablet    PRINZIDE/ZESTORETIC    90 tablet    Take 1 tablet by mouth daily    Benign essential hypertension

## 2018-07-19 NOTE — PROGRESS NOTES
HPI       Chrissy Looney is a 58 year old  who presents for   Chief Complaint   Patient presents with     Elbow Pain     bilateral elbow pain      1.  Elbow pain follow-up. Was first seen in clinic on 5/25/18 with 3 week history at that time.    Only mild improvement with Tylenol and ice application and then pain returns.    Continued left elbow pain with holding grandson, movement of elbow, sweeping.       2. History of Pre-diabetes, due for annual HgbA1C.        3.  Hypertension Follow-up  <140/90    Outpatient blood pressures are not being checked.    Chest Pain? :No     Low Salt Diet: no added salt    Daily NSAID Use?No     Did patient take their HTN pills today/last night as usual?  Yes    Last Basic Metabolic Panel:  Lab Results   Component Value Date    .3 09/06/2017      Lab Results   Component Value Date    POTASSIUM 3.6 09/06/2017     Lab Results   Component Value Date    CHLORIDE 99.4 09/06/2017     Lab Results   Component Value Date    JAS 9.2 09/06/2017     Lab Results   Component Value Date    CO2 31.8 09/06/2017     Lab Results   Component Value Date    BUN 8.6 09/06/2017     Lab Results   Component Value Date    CR 0.6 09/06/2017     Lab Results   Component Value Date    .9 09/06/2017           Problem, Medication and Allergy Lists were   reviewed and updated if needed.     Patient Active Problem List    Diagnosis Date Noted     Chronic maxillary sinusitis 03/16/2016     Priority: Medium     Chronic maxillary and frontal sinusitis starting in February.  Unchanged with one round of amoxicillin.  Recommended daily flonase use.       Low back pain 11/04/2013     Priority: Medium     Diagnosis updated by automated process. Provider to review and confirm.       Prediabetes 01/24/2013     Priority: Medium     Essential hypertension 10/11/2012     Priority: Medium     Glucose intolerance (impaired glucose tolerance) 10/11/2012     Priority: Medium   ,     Current Outpatient Prescriptions    Medication Sig Dispense Refill     ACETAMINOPHEN 8 HOUR 650 MG TABS Take 1 tablet by mouth every 8 hours as needed 100 tablet 1     cetirizine (ZYRTEC) 10 MG tablet Take 1 tablet (10 mg) by mouth daily 90 tablet 3     lisinopril-hydrochlorothiazide (PRINZIDE/ZESTORETIC) 20-12.5 MG per tablet Take 1 tablet by mouth daily 90 tablet 3   ,   No Known Allergies.    Patient is an established patient of this clinic..         Review of Systems:   Review of Systems   Constitutional: Negative.    Respiratory: Negative.    Cardiovascular: Negative.    Musculoskeletal:        Left elbow pain - see HPI            Physical Exam:     Vitals:    07/19/18 0819   BP: 136/88   Pulse: 92   Resp: 16   Temp: 98.1  F (36.7  C)   TempSrc: Oral   SpO2: 96%   Weight: 165 lb 9.6 oz (75.1 kg)     Body mass index is 31.29 kg/(m^2).  Vitals were reviewed and were normal     Physical Exam   Constitutional: She appears well-nourished. No distress.   Cardiovascular: Normal rate and regular rhythm.    Pulmonary/Chest: Effort normal and breath sounds normal. No respiratory distress. She has no wheezes.   Musculoskeletal:        Left elbow: She exhibits normal range of motion, no swelling and no effusion. Tenderness found. Medial epicondyle tenderness noted.        Arms:          Results:     Results for orders placed or performed in visit on 07/19/18   Hemoglobin A1c ([facility])   Result Value Ref Range    Hemoglobin A1C 5.9 (H) 4.1 - 5.7 %       Assessment and Plan        Chrisys was seen today for elbow pain.    Diagnoses and all orders for this visit:    Medial epicondylitis of elbow, left  -     [facility] INTERNAL REFERRAL for further management recommendations.         -     Considered imaging at today's appointment; however, was unable to perform x-ray in clinic.        -     Continue ice, Tylenol and activity modification. No noted improvement with counter-force brace.      Essential hypertension  -     Hemoglobin A1c  ([facility])  -     Basic metabolic panel    Prediabetes  HgbA1C stable, slightly improved from 6.1% one year ago, continue to monitor annually.     Preventative health care  -     Screening Mammogram Digital Bilateral; Future    Follow-up with no improvement or worsening of symptoms.     Options for treatment and follow-up care were reviewed with the patient. Chrissy Looney  engaged in the decision making process and verbalized understanding of the options discussed and agreed with the final plan.    MAE Mireles CNP

## 2018-07-19 NOTE — PATIENT INSTRUCTIONS
Here is the plan from today's visit    1. Essential hypertension  - Basic metabolic panel    2. Prediabetes  - Hemoglobin A1c ([facility])    3. Medial epicondylitis of elbow, left  - [facility] INTERNAL REFERRAL    4. Preventative health care  - Screening Mammogram Digital Bilateral; Future    Follow-up with no improvement or worsening of symptoms.     Thank you for coming to [facility] today.  Lab Testing:  **If you had lab testing today and your results are reassuring or normal they will be mailed to you or sent through [employer] within 7 days.   **If the lab tests need quick action we will call you with the results.  The phone number we will call with results is # [phone] (home) . If this is not the best number please call our clinic and change the number.  Medication Refills:  If you need any refills please call your pharmacy and they will contact us.   If you need to  your refill at a new pharmacy, please contact the new pharmacy directly. The new pharmacy will help you get your medications transferred faster.   Scheduling:  If you have any concerns about today's visit or wish to schedule another appointment please call our office during normal business hours [phone] (8-5:00 M-F)  If a referral was made to a [facility] and you don't get a call from central scheduling please call [phone].  If a Mammogram was ordered for you at [facility] call [phone] to schedule or change your appointment.  If you had an XRay/CT/Ultrasound/MRI ordered the number is [phone] to schedule or change your radiology appointment.   Medical Concerns:  If you have urgent medical concerns please call [phone] at any time of the day.  If you have a medical emergency please call 191.

## 2018-07-30 ENCOUNTER — HEALTH MAINTENANCE LETTER (OUTPATIENT)
Age: 58
End: 2018-07-30

## 2018-07-31 ENCOUNTER — OFFICE VISIT (OUTPATIENT)
Dept: FAMILY MEDICINE | Facility: CLINIC | Age: 58
End: 2018-07-31
Payer: COMMERCIAL

## 2018-07-31 VITALS
SYSTOLIC BLOOD PRESSURE: 138 MMHG | RESPIRATION RATE: 16 BRPM | DIASTOLIC BLOOD PRESSURE: 89 MMHG | WEIGHT: 165.2 LBS | OXYGEN SATURATION: 96 % | TEMPERATURE: 98.3 F | BODY MASS INDEX: 31.21 KG/M2 | HEART RATE: 91 BPM

## 2018-07-31 DIAGNOSIS — M77.8 TENDINITIS OF LEFT TRICEPS: ICD-10-CM

## 2018-07-31 DIAGNOSIS — M77.12 LEFT LATERAL EPICONDYLITIS: Primary | ICD-10-CM

## 2018-07-31 ASSESSMENT — ENCOUNTER SYMPTOMS
WOUND: 0
NUMBNESS: 0
MYALGIAS: 0
WEAKNESS: 0
ARTHRALGIAS: 1
JOINT SWELLING: 0
NECK PAIN: 0

## 2018-07-31 NOTE — PROGRESS NOTES
HPI       Chrissy Looney is a 58 year old  who presents for   Chief Complaint   Patient presents with     Elbow Pain     f/u left elbow pain and tenderness since May. Limiting her daily activities. Sharp stabbing pain at night time. Taking tylenol and icing w/ some improvement.          Concern: L elbow pain   Description of the problem :bilateral epicondyle pain of L elbow, patient is right-hand dominant. It is worse with picking up the phone, mopping, picking up 8 month old grandson, pushing stroller. It's slightly better since the onset at May with tylenol and ice, but not by much. Activity modification has been somewhat helpful.         Problem, Medication and Allergy Lists were reviewed and updated if needed..    Patient is an established patient of this clinic..         Review of Systems:   Review of Systems   Musculoskeletal: Positive for arthralgias. Negative for gait problem, joint swelling, myalgias and neck pain.   Skin: Negative for rash and wound.   Neurological: Negative for weakness and numbness.            Physical Exam:     Vitals:    07/31/18 0826   BP: 138/89   Pulse: 91   Resp: 16   Temp: 98.3  F (36.8  C)   TempSrc: Oral   SpO2: 96%   Weight: 165 lb 3.2 oz (74.9 kg)     Body mass index is 31.21 kg/(m^2).  Vitals were reviewed and were normal     Physical Exam   Constitutional: She is oriented to person, place, and time. She appears well-developed and well-nourished. No distress.   HENT:   Head: Normocephalic and atraumatic.   Right Ear: External ear normal.   Left Ear: External ear normal.   Eyes: Conjunctivae are normal.   Neck: Neck supple.   Pulmonary/Chest: Effort normal. No respiratory distress.   Musculoskeletal: She exhibits no deformity.   Neurological: She is alert and oriented to person, place, and time.   Skin: Skin is warm and dry.   Psychiatric: She has a normal mood and affect.     Inspection: no swelling, no ecchymosis, no olecranon bursa swelling, no distal bicep tendon  defect  Tender: lateral epicondyle and common extensor tendon  Non-tender: medial epicondyle, olecranon bursa, distal bicep tendon and radial head/neck  Range of Motion: all normal  Strength: elbow strength full  Special tests: pain with resisted wrist extension, pain with resisted middle finger extension, no pain with resisted wrist flexion, pain with resisted supination:       Results:   No testing ordered today    Assessment and Plan        1. Left lateral epicondylitis  - encouraged activity modification and wearing her brace  - Home exercises given for lateral epicondylitis treatment  - diclofenac (VOLTAREN) 1 % GEL topical gel; Apply 4 grams to knees or 2 grams to hands four times daily using enclosed dosing card.  Dispense: 100 g; Refill: 1  - [facility], PT, HAND AND CHIROPRACTIC REFERRAL - [facility]  - strongly encouraged patient to follow through with hand therapy referral, as home exercises are the best way to prevent recurrence. Patient unsure if she will be able to make alternative childcare arrangements in order to make the appointment.    2. Tendinitis of left triceps  - [facility] PT, HAND AND CHIROPRACTIC REFERRAL - [facility]       There are no discontinued medications.    Options for treatment and follow-up care were reviewed with the patient. Chrissy Looney  engaged in the decision making process and verbalized understanding of the options discussed and agreed with the final plan.    I have discussed the case and examined this patient.  I agree with the above written documentation.  Akilah Mckenzie MD      Scribed by Kenroy Bautista DO

## 2018-07-31 NOTE — PATIENT INSTRUCTIONS
Here is the plan from today's visit    1. Left lateral epicondylitis  - home exercises  - diclofenac (VOLTAREN) 1 % GEL topical gel; Apply 4 grams to knees or 2 grams to hands four times daily using enclosed dosing card.  Dispense: 100 g; Refill: 1  - [facility] PT, HAND AND CHIR[facility]RACTIC REF[facility]AL - [facility]    2. Tendinitis of left triceps  - [facility] PT, HAND [facility] CH[facility]IC REFERRAL - [facility]    Please call or return to clinic if your symptoms don't go away.    Follow up plan  Follow up if you are not improving in the next 1 month.    Thank you for coming to [facility] today.  Lab Testing:  **If you had lab testing today and your results are reassuring or normal they will be mailed to you or sent through [employer] within 7 days.   **If the lab tests need quick action we will call you with the results.  The phone number we will call with results is # [phone] (home) . If this is not the best number please call our clinic and change the number.  Medication Refills:  If you need any refills please call your pharmacy and they will contact us.   If you need to  your refill at a new pharmacy, please contact the new pharmacy directly. The new pharmacy will help you get your medications transferred faster.   Scheduling:  If you have any concerns about today's visit or wish to schedule another appointment please call our office during normal business hours [phone] (8-5:00 M-F)  If a referral was made to a [facility] and you don't get a call from central scheduling please call [phone].  If a Mammogram was ordered for you at [facility] call [phone] to schedule or change your appointment.  If you had an XRay/CT/Ultrasound/MRI ordered the number is [phone] to schedule or change your radiology appointment.   Medical Concerns:  If you have urgent medical concerns please call [phone] at any time of the day.    Akilah Mckenzie MD  Scribed by Kenroy Bautista,

## 2018-07-31 NOTE — MR AVS SNAPSHOT
After Visit Summary   7/31/2018    Chrissy Looney    MRN: [id number]           Patient Information     Date Of Birth          1960        Visit Information        Provider Department      7/31/2018 8:20 AM Akilah Mckenzie MD \[facility]\"" [facility]        Today's Diagnoses     Left lateral epicondylitis    -  1    Tendinitis of left triceps          Care Instructions    Here is the plan from today's visit    1. Left lateral epicondylitis  - home exercises  - diclofenac (VOLTAREN) 1 % GEL topical gel; Apply 4 grams to knees or 2 grams to hands four times daily using enclosed dosing card.  Dispense: 100 g; Refill: 1  - [facility] PT, HAND AND CHIR[facility]RACTIC REFERRAL - [facility]    2. Tendinitis of left triceps  - [facility] PT, HAND [facility] CH[facility]RACTIC REFERRAL - [facility]    Please call or return to clinic if your symptoms don't go away.    Follow up plan  Follow up if you are not improving in the next 1 month.    Thank you for coming to [facility] today.  Lab Testing:  **If you had lab testing today and your results are reassuring or normal they will be mailed to you or sent through [employer] within 7 days.   **If the lab tests need quick action we will call you with the results.  The phone number we will call with results is # [phone] (home) . If this is not the best number please call our clinic and change the number.  Medication Refills:  If you need any refills please call your pharmacy and they will contact us.   If you need to  your refill at a new pharmacy, please contact the new pharmacy directly. The new pharmacy will help you get your medications transferred faster.   Scheduling:  If you have any concerns about today's visit or wish to schedule another appointment please call our office during normal business hours [phone] (8-5:00 M-F)  If a referral was made to a [facility] and you don't get a call from central scheduling please call [phone].  If a  Mammogram was ordered for you at [facility] call [phone] to schedule or change your appointment.  If you had an XRay/CT/Ultrasound/MRI ordered the number is [phone] to schedule or change your radiology appointment.   Medical Concerns:  If you have urgent medical concerns please call [phone] at any time of the day.    Akilah Mckenzie MD  Scribed by Kenroy Bautista, DO              Follow-ups after your visit        Additional Services     [facility], PT, HAND AND CHIR[facility]RACTIC REFERRAL - [facility]       **This order will print in the [facility] Scheduling Office**    Physical Therapy, Hand Therapy and Chiropractic Care are available through:    *[facility]  *[facility]  *[facility]    Call one number to schedule at any of the above locations: [phone].    Your provider has referred you to: Hand Therapy    Indication/Reason for Referral: Elbow Pain  Onset of Illness: May 2018  Therapy Orders: Evaluate and Treat  Special Programs: None  Special Request: None    Sherice Smiley      Additional Comments for the Therapist or Chiropractor:       Please be aware that coverage of these services is subject to the terms and limitations of your health insurance plan.  Call member services at your health plan with any benefit or coverage questions.      Please bring the following to your appointment:    *Your personal calendar for scheduling future appointments  *Comfortable clothing                  Who to contact     Please call your clinic at [phone] to:    Ask questions about your health    Make or cancel appointments    Discuss your medicines    Learn about your test results    Speak to your doctor            Additional Information About Your Visit        [employer] Test Information     [employer] gives you secure access to your electronic health record. If you see a primary care provider, you can also send messages to your care team and make appointments. If  you have questions, please call your primary care clinic.  If you do not have a primary care provider, please call [phone] and they will assist you.      [employer] is an electronic gateway that provides easy, online access to your medical records. With [employer], you can request a clinic appointment, read your test results, renew a prescription or communicate with your care team.     To access your existing account, please contact your [facility] or call [phone] for assistance.        Care EveryWhere ID     This is your Care EveryWhere ID. This could be used by other organizations to access your [facility] medical records  [id number]        Your Vitals Were     Pulse Temperature Respirations Last Period Pulse Oximetry BMI (Body Mass Index)    91 98.3  F (36.8  C) (Oral) 16 11/04/2013 96% 31.21 kg/m2       Blood Pressure from Last 3 Encounters:   07/31/18 138/89   07/19/18 136/88   05/25/18 120/83    Weight from Last 3 Encounters:   07/31/18 165 lb 3.2 oz (74.9 kg)   07/19/18 165 lb 9.6 oz (75.1 kg)   05/25/18 167 lb (75.8 kg)              We Performed the Following     [facility], PT, HAND AND CH[facility]RACTIC REFERRAL - [facility]          Today's Medication Changes          These changes are accurate as of 7/31/18  9:14 AM.  If you have any questions, ask your nurse or doctor.               Start taking these medicines.        Dose/Directions    diclofenac 1 % Gel topical gel   Commonly known as:  VOLTAREN   Used for:  Left lateral epicondylitis   Started by:  Akilah Mckenzie MD        Apply 4 grams to knees or 2 grams to hands four times daily using enclosed dosing card.   Quantity:  100 g   Refills:  1            Where to get your medicines      These medications were sent to [facility] [place], [place] - [address] [facility] [address]     Phone:  [phone]     diclofenac 1 % Gel topical gel                Primary Care Provider Office Phone  # Fax #    Olga Koenig, APRN [facility] [phone]       [address] [facility] [address]        Equal Access to Services     CHUY CAMACHO : Hadii aad ku hadmarie Butcher, waramesh fowler, qanikoleta kamadeleineda andrae, brigitte santamaria frances welsh. So [facility] [phone].    ATENCIÓN: Si habla español, tiene a enriquez disposición servicios gratuitos de asistencia lingüística. Llame al [phone].    We comply with applicable federal civil rights laws and [place] laws. We do not discriminate on the basis of race, color, national origin, age, disability, sex, sexual orientation, or gender identity.            Thank you!     Thank you for choosing [facility]  for your care. Our goal is always to provide you with excellent care. Hearing back from our patients is one way we can continue to improve our services. Please take a few minutes to complete the written survey that you may receive in the mail after your visit with us. Thank you!             Your Updated Medication List - Protect others around you: Learn how to safely use, store and throw away your medicines at www.disposemymeds.org.          This list is accurate as of 7/31/18  9:14 AM.  Always use your most recent med list.                   Brand Name Dispense Instructions for use Diagnosis    ACETAMINOPHEN 8 HOUR 650 MG 8 hour tablet   Generic drug:  acetaminophen     100 tablet    Take 1 tablet by mouth every 8 hours as needed    Left shoulder pain, Shoulder joint pain, left       cetirizine 10 MG tablet    zyrTEC    90 tablet    Take 1 tablet (10 mg) by mouth daily    Environmental and seasonal allergies       diclofenac 1 % Gel topical gel    VOLTAREN    100 g    Apply 4 grams to knees or 2 grams to hands four times daily using enclosed dosing card.    Left lateral epicondylitis       lisinopril-hydrochlorothiazide 20-12.5 MG per tablet    PRINZIDE/ZESTORETIC    90 tablet    Take 1 tablet by mouth daily     Benign essential hypertension

## 2018-08-09 ENCOUNTER — THERAPY VISIT (OUTPATIENT)
Dept: OCCUPATIONAL THERAPY | Facility: CLINIC | Age: 58
End: 2018-08-09
Payer: COMMERCIAL

## 2018-08-09 DIAGNOSIS — M25.522 LEFT ELBOW PAIN: Primary | ICD-10-CM

## 2018-08-09 DIAGNOSIS — M77.12 LEFT TENNIS ELBOW: ICD-10-CM

## 2018-08-09 PROCEDURE — 97760 ORTHOTIC MGMT&TRAING 1ST ENC: CPT | Mod: GO | Performed by: OCCUPATIONAL THERAPIST

## 2018-08-09 PROCEDURE — 97165 OT EVAL LOW COMPLEX 30 MIN: CPT | Mod: GO | Performed by: OCCUPATIONAL THERAPIST

## 2018-08-09 PROCEDURE — 97110 THERAPEUTIC EXERCISES: CPT | Mod: GO | Performed by: OCCUPATIONAL THERAPIST

## 2018-08-09 NOTE — PROGRESS NOTES
Hand Therapy Initial Evaluation    Current Date:  8/9/2018    Diagnosis: Left lateral epicondylitis  DOI: 07/31/18 (Date of referral. Patient reports symptoms since May)       Subjective:  Chrissy Looney is a 58 year old right hand dominant female.    Patient reports symptoms of pain and occasional numbness and tingling of the left elbow which occurred due to repetitive use. Since onset symptoms are unchanged  Special tests:  none.  Previous treatment: topical pain relief cream, ice, and forearm band which patient wears at daytime and sometimes at night.    General health as reported by patient is good.  Pertinent medical history includes:High Blood Pressure  Medical allergies: none.  Surgical history: none.  Medication history: High Blood Pressure.    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 Patient does not drive.  Leisure activities/hobbies: Crafting and cake decorating    Upper Extremity Functional Index Score: 66/80  (A lower score indicates greater disability.)      Objective:  Pain Level Report  VAS(0-10) 8/9/2018   At Rest: 4-5/10   With Use: 8/10     Report of Pain:  Location:  elbow  Pain Quality:  Aching, Sharp and Shooting  Frequency: constant    Pain is worst:  daytime  Exacerbated by:  Activity; lifting, mopping, pushing a stroller  Relieved by:  cold, rest and topical cream  Progression:  A little bit better since the start of May  ROM:  Pain Report:  - none    + mild    ++ moderate    +++ severe   Elbow  8/9/2018   AROM(PROM) Right Left   Ext 0 10   Flex 135 120   Sup 90 90   Pron 90 90     Wrist  8/9/2018   AROM(PROM) Right Left   Ext 60 65 +    Flex 70 55   RD 45 35   UD 30 40     Resisted Testing     Pain Report:  - none    + mild    ++ moderate    +++ severe    8/9/2018   Elbow Extension 5/5 +   Elbow Flexion 5/5 +   Supination 5/5 ++   Pronation 5/5 +   Wrist Flex 5/5   Wrist Ext 5/5 ++   Wrist Ext   elbow extended 5/5 ++   Long finger test 5/5 ++     Strength:   (Measured in pounds)  Pain Report:  - none    + mild    ++ moderate    +++ severe     8/9/2018   Trials RIght Left   1  2  3 60  60  60 50 ++  55 ++  50 ++   Average: 60 51.6       Elbow Ext  8/9/2018   Trials Right Left    65 35 +++     Pain Report:  - none    + mild    ++ moderate    +++ severe   Palpation 8/9/2018   Lateral Epicondyle ++   PIN +   Radial Head +     Assessment:  Patient presents with symptoms consistent with diagnosis of Lateral Epicondylitis,  with conservative intervention.   Patient's limitations or Problem List includes:  Pain, Decreased ROM/motion, Weakness, Decreased  and Tightness in musculature of the left elbow and wrist which interferes with the patient's ability to perform Self Care Tasks (dressing), Work Tasks, Sleep Patterns, Recreational Activities and Household Chores as compared to previous level of function.    Rehab Potential:  Excellent - Return to full activity, no limitations    Patient will benefit from skilled Occupational Therapy to increase ROM, flexibility,  strength and forearm strength and decrease pain to return to previous activity level and resume normal daily tasks and to reach their rehab potential.    Barriers to Learning:  No barrier    Communication Issues:  Patient appears to be able to clearly communicate and understand verbal and written communication and follow directions correctly.    Chart Review: Chart Review and Brief history including review of medical and/or therapy records relating to the presenting problem    Identified Performance Deficits: dressing, hygiene and grooming, care of others, child rearing, home  establishment and management, meal preparation and cleanup, shopping, sleep and leisure activities    Assessment of Occupational Performance:  1-3 Performance Deficits    Clinical Decision Making (Complexity): Low complexity    Treatment Explanation:  The following has been discussed with the patient:    RX ordered/plan of care  Anticipated outcomes  Possible risks and side effects    Plan:  Frequency:  1 X week, once daily  Duration:  for 12 visits    Treatment Plan:    Modalities:  US and Fluidotherapy   Therapeutic Exercise: AROM of elbow and wrist, PROM with stretch to wrist extensors and flexors,  ECRL strengthening progressing to ECRB strengthening (eccentric progressing to concentric ).   Manual Techniques: Radial head mobilization, friction massage, myofascial release  Neuromuscular:  Nerve gliding, K taping  Orthoses:  Wrist cock up splint, arm band     Discharge Plan:    Achieve all LTG.  Independent in home treatment program.  Reach maximal therapeutic benefit.    Home Program:   Warmth for stiffness  Ice after activity for pain  PROM with stretch to wrist extensors and flexors  TFM to LEP  MFR to extensors  ECRL strengthening  Wrist cock up orthosis sleeping  Elbow strap/arm band as needed with activities  Avoid activities that exacerbate pain in the elbow  Lift with forearms in neutral position    Next Visit:  Assess splint for comfort  Progress stretches if no pain  K taping

## 2018-08-09 NOTE — MR AVS SNAPSHOT
After Visit Summary   8/9/2018    Chrissy Looney    MRN: [id number]           Patient Information     Date Of Birth          1960        Visit Information        Provider Department      8/9/2018 8:00 AM [facility]        Today's Diagnoses     Left elbow pain    -  1    Left tennis elbow           Follow-ups after your visit        Your next 10 appointments already scheduled     Aug 16, 2018  7:30 AM CDT   [facility] ([facility])    [address] [place] [address] [phone]            Aug 23, 2018  8:00 AM CDT   [facility] with [facility] ([facility])    [address] [place] [address] [phone]              Who to contact     If you have questions or need follow up information about today's clinic visit or your schedule please contact [facility] directly at [phone].  Normal or non-critical lab and imaging results will be communicated to you by Mediamorphhart, letter or phone within 4 business days after the clinic has received the results. If you do not hear from us within 7 days, please contact the clinic through Mediamorphhart or phone. If you have a critical or abnormal lab result, we will notify you by phone as soon as possible.  Submit refill requests through [employer] or call your pharmacy and they will forward the refill request to us. Please allow 3 business days for your refill to be completed.          Additional Information About Your Visit        Mediamorphhart Information     Gov[employer]ivery gives you secure access to your electronic health record. If you see a primary care provider, you can also send messages to your care team and make appointments. If you have questions, please call your primary care clinic.  If you do not have a primary care provider, please call [phone] and they  will assist you.        Care EveryWhere ID     This is your Care EveryWhere ID. This could be used by other organizations to access your [place] medical records  [id number]        Your Vitals Were     Last Period                   11/04/2013            Blood Pressure from Last 3 Encounters:   07/31/18 138/89   07/19/18 136/88   05/25/18 120/83    Weight from Last 3 Encounters:   07/31/18 74.9 kg (165 lb 3.2 oz)   07/19/18 75.1 kg (165 lb 9.6 oz)   05/25/18 75.8 kg (167 lb)              We Performed the Following     HC OT EVAL, LOW COMPLEXITY     ORTHOTIC MGMT AND TRAINING, EACH 15 MIN     THERAPEUTIC EXERCISES        Primary Care Provider Office Phone # Fax #    Olga Michelle Koenig, MAE [facility] [phone]       [address] [facility] [address]        Equal Access to Services     CHUY CAMACHO : Hadii aad ku hadasho Sochelly, waaxda luqadaha, qaybta kaalmada salimayaelvira, brigitte daniels . So [facility] [phone].    ATENCIÓN: Si habla español, tiene a enriquez disposición servicios gratuitos de asistencia lingüística. Llame al [phone].    We comply with applicable federal civil rights laws and [place] laws. We do not discriminate on the basis of race, color, national origin, age, disability, sex, sexual orientation, or gender identity.            Thank you!     Thank you for choosing [facility] HAND [facility]ER[facility]  for your care. Our goal is always to provide you with excellent care. Hearing back from our patients is one way we can continue to improve our services. Please take a few minutes to complete the written survey that you may receive in the mail after your visit with us. Thank you!             Your Updated Medication List - Protect others around you: Learn how to safely use, store and throw away your medicines at www.disposemymeds.org.          This list is accurate as of 8/9/18 12:01 PM.  Always use your most recent med list.                   Brand Name Dispense Instructions  for use Diagnosis    ACETAMINOPHEN 8 HOUR 650 MG 8 hour tablet   Generic drug:  acetaminophen     100 tablet    Take 1 tablet by mouth every 8 hours as needed    Left shoulder pain, Shoulder joint pain, left       cetirizine 10 MG tablet    zyrTEC    90 tablet    Take 1 tablet (10 mg) by mouth daily    Environmental and seasonal allergies       diclofenac 1 % Gel topical gel    VOLTAREN    100 g    Apply 4 grams to knees or 2 grams to hands four times daily using enclosed dosing card.    Left lateral epicondylitis       lisinopril-hydrochlorothiazide 20-12.5 MG per tablet    PRINZIDE/ZESTORETIC    90 tablet    Take 1 tablet by mouth daily    Benign essential hypertension

## 2018-08-13 ENCOUNTER — OFFICE VISIT (OUTPATIENT)
Dept: AUDIOLOGY | Facility: CLINIC | Age: 58
End: 2018-08-13
Payer: COMMERCIAL

## 2018-08-13 DIAGNOSIS — H90.3 SENSORY HEARING LOSS, BILATERAL: Primary | ICD-10-CM

## 2018-08-16 ENCOUNTER — THERAPY VISIT (OUTPATIENT)
Dept: OCCUPATIONAL THERAPY | Facility: CLINIC | Age: 58
End: 2018-08-16
Payer: COMMERCIAL

## 2018-08-16 DIAGNOSIS — M77.12 LEFT TENNIS ELBOW: ICD-10-CM

## 2018-08-16 DIAGNOSIS — M25.522 LEFT ELBOW PAIN: ICD-10-CM

## 2018-08-16 PROCEDURE — 97035 APP MDLTY 1+ULTRASOUND EA 15: CPT | Mod: GO | Performed by: OCCUPATIONAL THERAPIST

## 2018-08-16 PROCEDURE — 97140 MANUAL THERAPY 1/> REGIONS: CPT | Mod: GO | Performed by: OCCUPATIONAL THERAPIST

## 2018-08-16 PROCEDURE — 97110 THERAPEUTIC EXERCISES: CPT | Mod: GO | Performed by: OCCUPATIONAL THERAPIST

## 2018-08-16 NOTE — MR AVS SNAPSHOT
After Visit Summary   8/16/2018    Chrissy Looney    MRN: [id number]           Patient Information     Date Of Birth          1960        Visit Information        Provider Department      8/16/2018 7:30 AM [facility]        Today's Diagnoses     Left tennis elbow        Left elbow pain           Follow-ups after your visit        Your next 10 appointments already scheduled     Aug 23, 2018  8:00 AM CDT   [facility] Hand with Lucie Junior OT   [facility] ([facility] [place])    [address] [place] [address] [phone]              Who to contact     If you have questions or need follow up information about today's clinic visit or your schedule please contact [facility] directly at [phone].  Normal or non-critical lab and imaging results will be communicated to you by Aspects Softwarehart, letter or phone within 4 business days after the clinic has received the results. If you do not hear from us within 7 days, please contact the clinic through Aspects Softwarehart or phone. If you have a critical or abnormal lab result, we will notify you by phone as soon as possible.  Submit refill requests through Tokalas or call your pharmacy and they will forward the refill request to us. Please allow 3 business days for your refill to be completed.          Additional Information About Your Visit        Aspects Softwarehart Information     Tokalas gives you secure access to your electronic health record. If you see a primary care provider, you can also send messages to your care team and make appointments. If you have questions, please call your primary care clinic.  If you do not have a primary care provider, please call [phone] and they will assist you.        Care EveryWhere ID     This is your Care EveryWhere ID. This could be used by other organizations to access your [place] medical records  [id number]        Your Vitals Were     Last  Period                   11/04/2013            Blood Pressure from Last 3 Encounters:   07/31/18 138/89   07/19/18 136/88   05/25/18 120/83    Weight from Last 3 Encounters:   07/31/18 74.9 kg (165 lb 3.2 oz)   07/19/18 75.1 kg (165 lb 9.6 oz)   05/25/18 75.8 kg (167 lb)              We Performed the Following     MANUAL THER TECH,1+REGIONS,EA 15 MIN     THERAPEUTIC EXERCISES     ULTRASOUND THERAPY        Primary Care Provider Office Phone # Fax #    Olga Koenig, APRN [facility] [phone]       [address] [facility] [address]        Equal Access to Services     CHUY CAMACHO : Hadii adolph Butcher, waaxda ednaadaha, qaybta kaalmada andrae, brigitte daniels . So [facility] [phone].    ATENCIÓN: Si habla español, tiene a enriquez disposición servicios gratuitos de asistencia lingüística. Llame al [phone].    We comply with applicable federal civil rights laws and [place] laws. We do not discriminate on the basis of race, color, national origin, age, disability, sex, sexual orientation, or gender identity.            Thank you!     Thank you for choosing [facility] HAND THER[facility]  for your care. Our goal is always to provide you with excellent care. Hearing back from our patients is one way we can continue to improve our services. Please take a few minutes to complete the written survey that you may receive in the mail after your visit with us. Thank you!             Your Updated Medication List - Protect others around you: Learn how to safely use, store and throw away your medicines at www.disposemymeds.org.          This list is accurate as of 8/16/18  8:26 AM.  Always use your most recent med list.                   Brand Name Dispense Instructions for use Diagnosis    ACETAMINOPHEN 8 HOUR 650 MG 8 hour tablet   Generic drug:  acetaminophen     100 tablet    Take 1 tablet by mouth every 8 hours as needed    Left shoulder pain, Shoulder joint pain, left        cetirizine 10 MG tablet    zyrTEC    90 tablet    Take 1 tablet (10 mg) by mouth daily    Environmental and seasonal allergies       diclofenac 1 % Gel topical gel    VOLTAREN    100 g    Apply 4 grams to knees or 2 grams to hands four times daily using enclosed dosing card.    Left lateral epicondylitis       lisinopril-hydrochlorothiazide 20-12.5 MG per tablet    PRINZIDE/ZESTORETIC    90 tablet    Take 1 tablet by mouth daily    Benign essential hypertension

## 2018-08-16 NOTE — PROGRESS NOTES
SOAP note objective information for 8/16/2018.    Hand Therapy Initial Evaluation    Current Date:  8/16/2018    Diagnosis: Left lateral epicondylitis  DOI: 07/31/18 (Date of referral. Patient reports symptoms since May)    Subjective:  My elbow is feeling a little bit better. The splint is really helpful when I'm pushing the stroller.    Objective:  Pain Level Report  VAS(0-10) 8/9/2018 8/16/18   At Rest: 4-5/10 3/10   With Use: 8/10 8/10; has not changed     ROM:  Pain Report:  - none    + mild    ++ moderate    +++ severe   Elbow  8/9/2018   AROM(PROM) Right Left   Ext 0 10   Flex 135 120   Sup 90 90   Pron 90 90     Wrist  8/9/2018   AROM(PROM) Right Left   Ext 60 65 +   Flex 70 55   RD 45 35   UD 30 40     Resisted Testing     Pain Report:  - none    + mild    ++ moderate    +++ severe    8/9/2018 8/16/18   Elbow Extension 5/5 +    Elbow Flexion 5/5 +    Supination 5/5 ++    Pronation 5/5 +    Wrist Flex 5/5    Wrist Ext 5/5 ++ 5/5 +   Wrist Ext   elbow extended 5/5 ++    Long finger test 5/5 ++      Strength:   (Measured in pounds)  Pain Report:  - none    + mild    ++ moderate    +++ severe     8/9/2018 8/16/2018   Trials RIght Left Left   1  2  3 60  60  60 50 ++  55 ++  50 ++ 40 +   Average: 60 51.6        Elbow Ext  8/9/2018 8/16/2018   Trials Right Left Left    65 35 +++ 37 ++     Pain Report:  - none    + mild    ++ moderate    +++ severe   Palpation 8/9/2018 8/16/2018   Lateral Epicondyle ++ +   PIN + +   Radial Head +        Plan:  Frequency:  1 X week, once daily  Duration:  for 12 visits    Home Program:   Warmth for stiffness  Ice after activity for pain  PROM with stretch to wrist extensors and flexors  TFM to LEP  MFR to extensors  ECRL strengthening  Wrist cock up orthosis sleeping  Elbow strap/arm band as needed with activities  Avoid activities that exacerbate pain in the elbow  Lift with forearms in neutral position    Next Visit:  Ultrasound  Manual  Progress strengthening exercises  if tolerated

## 2018-09-07 ENCOUNTER — RADIANT APPOINTMENT (OUTPATIENT)
Dept: GENERAL RADIOLOGY | Facility: CLINIC | Age: 58
End: 2018-09-07
Attending: FAMILY MEDICINE
Payer: COMMERCIAL

## 2018-09-07 ENCOUNTER — OFFICE VISIT (OUTPATIENT)
Dept: FAMILY MEDICINE | Facility: CLINIC | Age: 58
End: 2018-09-07
Payer: COMMERCIAL

## 2018-09-07 VITALS
BODY MASS INDEX: 31.74 KG/M2 | DIASTOLIC BLOOD PRESSURE: 84 MMHG | RESPIRATION RATE: 16 BRPM | HEART RATE: 87 BPM | SYSTOLIC BLOOD PRESSURE: 146 MMHG | TEMPERATURE: 98.4 F | WEIGHT: 168 LBS | OXYGEN SATURATION: 98 %

## 2018-09-07 DIAGNOSIS — M77.12 LATERAL EPICONDYLITIS OF BOTH ELBOWS: Primary | ICD-10-CM

## 2018-09-07 DIAGNOSIS — M77.12 LATERAL EPICONDYLITIS OF BOTH ELBOWS: ICD-10-CM

## 2018-09-07 DIAGNOSIS — M77.11 LATERAL EPICONDYLITIS OF BOTH ELBOWS: ICD-10-CM

## 2018-09-07 DIAGNOSIS — Z78.9 HEALTH MAINTENANCE ALTERATION: ICD-10-CM

## 2018-09-07 DIAGNOSIS — M77.11 LATERAL EPICONDYLITIS OF BOTH ELBOWS: Primary | ICD-10-CM

## 2018-09-07 RX ORDER — NAPROXEN 500 MG/1
TABLET ORAL
COMMUNITY
Start: 2018-05-25 | End: 2018-09-07

## 2018-09-07 RX ORDER — NAPROXEN 500 MG/1
500 TABLET ORAL 2 TIMES DAILY PRN
Qty: 60 TABLET | Refills: 1 | Status: SHIPPED | OUTPATIENT
Start: 2018-09-07 | End: 2019-08-13

## 2018-09-07 ASSESSMENT — ENCOUNTER SYMPTOMS
NUMBNESS: 0
ARTHRALGIAS: 1
FEVER: 0
BACK PAIN: 1
WEAKNESS: 0
ACTIVITY CHANGE: 0

## 2018-09-07 NOTE — PROGRESS NOTES
HPI       Chrissy Looney is a 58 year old  who presents for   Chief Complaint   Patient presents with     Elbow right     Tendonitis     Joint/Muscle Pain      Onset: Tendonitis R Elbow    Injury?  no    Description:   Location(s): R Elbow  Character: Dull ache    Intensity: 7.5/10    Progression of Symptoms: intermittent    Accompanying Signs & Symptoms:  Other symptoms: none    History:   Previous similar pain: Yes Details: L elbow has Tendonitis      Worsened by    Overuse?: Yes Details: Daily activities  Morning Stiffness?:Yes Details: a little    Alleviating factors:  Improved by: heat, ice and topical cream  Therapies Tried and outcome: PT, Details:wrist/hand brace     +++++++    Problem, Medication and Allergy Lists were reviewed and updated if needed..    Patient is an established patient of this clinic..         Review of Systems:   Review of Systems   Constitutional: Negative for activity change and fever.   Musculoskeletal: Positive for arthralgias and back pain (chronic).   Skin: Negative for rash.   Neurological: Negative for weakness and numbness.            Physical Exam:     Vitals:    09/07/18 0810   BP: 146/84   Pulse: 87   Resp: 16   Temp: 98.4  F (36.9  C)   TempSrc: Oral   SpO2: 98%   Weight: 168 lb (76.2 kg)     Body mass index is 31.74 kg/(m^2).  Vitals were reviewed and were normal     Physical Exam   Constitutional: She is oriented to person, place, and time. She appears well-developed.   HENT:   Head: Normocephalic and atraumatic.   Eyes: Conjunctivae are normal.   Neck: Normal range of motion. Neck supple.   Pulmonary/Chest: Effort normal.   Musculoskeletal: She exhibits no edema or tenderness.   Bilateral elbows: Full passive and active ROM. No effusion. No bony tenderness. No erythema or warmth. Increased pain with resisted supination.    Neurological: She is alert and oriented to person, place, and time. She has normal reflexes.   Skin: Skin is warm and dry.   Psychiatric: She has a  normal mood and affect. Her behavior is normal. Judgment and thought content normal.         Results:   Results from last visit:  Office Visit on 07/19/2018   Component Date Value Ref Range Status     Hemoglobin A1C 07/19/2018 5.9* 4.1 - 5.7 % Final     Sodium 07/19/2018 140  133 - 144 mmol/L Final     Potassium 07/19/2018 3.7  3.4 - 5.3 mmol/L Final     Chloride 07/19/2018 106  94 - 109 mmol/L Final     Carbon Dioxide 07/19/2018 28  20 - 32 mmol/L Final     Anion Gap 07/19/2018 7  3 - 14 mmol/L Final     Glucose 07/19/2018 98  70 - 99 mg/dL Final     Urea Nitrogen 07/19/2018 13  7 - 30 mg/dL Final     Creatinine 07/19/2018 0.73  0.52 - 1.04 mg/dL Final     GFR Estimate 07/19/2018 82  >60 mL/min/1.7m2 Final    Non  GFR Calc     GFR Estimate If Black 07/19/2018 >90  >60 mL/min/1.7m2 Final    African American GFR Calc     Calcium 07/19/2018 9.2  8.5 - 10.1 mg/dL Final       Assessment and Plan        Chrissy was seen today for elbow right.    Diagnoses and all orders for this visit:    Lateral epicondylitis of both elbows  No improvement with PT for L elbow. Now R elbow is painful as well. Will get XRs. Patient to return to sports medicine clinic to discuss next steps.   -     Sports Medicine Clinic[facility] INTERNAL REFERRAL  -     naproxen (NAPROSYN) 500 MG tablet; Take 1 tablet (500 mg) by mouth 2 times daily as needed for moderate pain  -     XR ELBOW RT 2 VW; Future  -     XR ELBOW LT 2 VW; Future    Health maintenance alteration  We discussed she is due for a mammogram, she prefers to wait until her elbows feel better. HM updated and postponed 3 months.          There are no discontinued medications.    Options for treatment and follow-up care were reviewed with the patient. Chrissy Looney  engaged in the decision making process and verbalized understanding of the options discussed and agreed with the final plan.    Britney Medel DO

## 2018-09-07 NOTE — PATIENT INSTRUCTIONS
Here is the plan from today's visit    1. Lateral epicondylitis of both elbows  - Sports Medicine Clinic-[facility] INTERNAL REFERRAL  - naproxen (NAPROSYN) 500 MG tablet; Take 1 tablet (500 mg) by mouth 2 times daily as needed for moderate pain  Dispense: 60 tablet; Refill: 1  - XR ELBOW RT 2 VW; Future  - XR ELBOW LT 2 VW; Future    Please call or return to clinic if your symptoms don't go away.    Follow up plan  Please make a clinic appointment for follow up with your primary physician MAE Edmondson CNP in 1-2 months for follow-up elbow pain.    Thank you for coming to [facility] today.  Lab Testing:  **If you had lab testing today and your results are reassuring or normal they will be mailed to you or sent through [employer] within 7 days.   **If the lab tests need quick action we will call you with the results.  The phone number we will call with results is # [phone] (home) . If this is not the best number please call our clinic and change the number.  Medication Refills:  If you need any refills please call your pharmacy and they will contact us.   If you need to  your refill at a new pharmacy, please contact the new pharmacy directly. The new pharmacy will help you get your medications transferred faster.   Scheduling:  If you have any concerns about today's visit or wish to schedule another appointment please call our office during normal business hours [phone] (8-5:00 M-F)  If a referral was made to a [facility] and you don't get a call from central scheduling please call [phone].  If a Mammogram was ordered for you at [facility] call [phone] to schedule or change your appointment.  If you had an XRay/CT/Ultrasound/MRI ordered the number is [phone] to schedule or change your radiology appointment.   Medical Concerns:  If you have urgent medical concerns please call [phone] at any time of the day.    Britney Medel,

## 2018-09-07 NOTE — MR AVS SNAPSHOT
After Visit Summary   9/7/2018    Chrissy Looney    MRN: [id number]           Patient Information     Date Of Birth          1960        Visit Information        Provider Department      9/7/2018 8:20 AM Britney Medel DO [facility]        Today's Diagnoses     Lateral epicondylitis of both elbows    -  1      Care Instructions    Here is the plan from today's visit    1. Lateral epicondylitis of both elbows  - [facility] INTERNAL REFERRAL  - naproxen (NAPROSYN) 500 MG tablet; Take 1 tablet (500 mg) by mouth 2 times daily as needed for moderate pain  Dispense: 60 tablet; Refill: 1  - XR ELBOW RT 2 VW; Future  - XR ELBOW LT 2 VW; Future    Please call or return to clinic if your symptoms don't go away.    Follow up plan  Please make a clinic appointment for follow up with your primary physician MAE Edmondson CNP in 1-2 months for follow-up elbow pain.    Thank you for coming to [facility] today.  Lab Testing:  **If you had lab testing today and your results are reassuring or normal they will be mailed to you or sent through [employer] within 7 days.   **If the lab tests need quick action we will call you with the results.  The phone number we will call with results is # [phone] (home) . If this is not the best number please call our clinic and change the number.  Medication Refills:  If you need any refills please call your pharmacy and they will contact us.   If you need to  your refill at a new pharmacy, please contact the new pharmacy directly. The new pharmacy will help you get your medications transferred faster.   Scheduling:  If you have any concerns about today's visit or wish to schedule another appointment please call our office during normal business hours [phone] (8-5:00 M-F)  If a referral was made to a [facility] and you don't get a call from central scheduling please call [phone].  If a  Mammogram was ordered for you at [facility] call [phone] to schedule or change your appointment.  If you had an XRay/CT/Ultrasound/MRI ordered the number is [phone] to schedule or change your radiology appointment.   Medical Concerns:  If you have urgent medical concerns please call [phone] at any time of the day.    Britney Medel, DO          Follow-ups after your visit        Additional Services     Sports Medicine [facility] INTERNAL REFERRAL       Reason: bilateral elbow pain, seen previously. Not improved with PT  Urgency of Appointment: Next Available  Length of Problem: Sub-Acute (3-12 weeks since onset).  What are you requesting be done? Management Recommendations                  Your next 10 appointments already scheduled     Sep 13, 2018  8:00 AM CDT   [facility] Hand with Doris KRUEGER [facility] ([facility])    [address] [place] [id number] [phone]              Future tests that were ordered for you today     Open Future Orders        Priority Expected Expires Ordered    XR ELBOW RT 2 VW Routine 9/7/2018 9/7/2019 9/7/2018    XR ELBOW LT 2 VW Routine 9/7/2018 9/7/2019 9/7/2018            Who to contact     Please call your clinic at [phone] to:    Ask questions about your health    Make or cancel appointments    Discuss your medicines    Learn about your test results    Speak to your doctor            Additional Information About Your Visit        [employer] Information     [employer] gives you secure access to your electronic health record. If you see a primary care provider, you can also send messages to your care team and make appointments. If you have questions, please call your primary care clinic.  If you do not have a primary care provider, please call [phone] and they will assist you.      [employer] is an electronic gateway that provides easy, online access to your medical records. With [employer], you can  request a clinic appointment, read your test results, renew a prescription or communicate with your care team.     To access your existing account, please contact your [facility] or call [phone] for assistance.        Care EveryWhere ID     This is your Care EveryWhere ID. This could be used by other organizations to access your [place] medical records  [id number]        Your Vitals Were     Pulse Temperature Respirations Last Period Pulse Oximetry BMI (Body Mass Index)    87 98.4  F (36.9  C) (Oral) 16 11/04/2013 98% 31.74 kg/m2       Blood Pressure from Last 3 Encounters:   09/07/18 146/84   07/31/18 138/89   07/19/18 136/88    Weight from Last 3 Encounters:   09/07/18 168 lb (76.2 kg)   07/31/18 165 lb 3.2 oz (74.9 kg)   07/19/18 165 lb 9.6 oz (75.1 kg)              We Performed the Following     [facility] INTERNAL REFERRAL          Today's Medication Changes          These changes are accurate as of 9/7/18  8:27 AM.  If you have any questions, ask your nurse or doctor.               These medicines have changed or have updated prescriptions.        Dose/Directions    naproxen 500 MG tablet   Commonly known as:  NAPROSYN   This may have changed:    - how much to take  - how to take this  - when to take this  - reasons to take this   Used for:  Lateral epicondylitis of both elbows   Changed by:  Britney Medel DO        Dose:  500 mg   Take 1 tablet (500 mg) by mouth 2 times daily as needed for moderate pain   Quantity:  60 tablet   Refills:  1            Where to get your medicines      These medications were sent to [facility] [place], [place] - [address] [facility] [address]     Phone:  [phone]     naproxen 500 MG tablet                Primary Care Provider Office Phone # Fax #    MAE Conroy [phone] [address]  [place] [address]        Equal Access to Services      CHUY [facility]: Hadii aad ku hadyfno Somariangelali, waaxda luqadaha, qaybta kaalmada adelexa, brigitte don brittanyrichard pearsonzulay santamaria frances . So [facility] [phone].    ATENCIÓN: Si habla rory, tiene a enriquez disposición servicios gratuitos de asistencia lingüística. Llame al [phone].    We comply with applicable federal civil rights laws and [place] laws. We do not discriminate on the basis of race, color, national origin, age, disability, sex, sexual orientation, or gender identity.            Thank you!     Thank you for choosing [facility]  for your care. Our goal is always to provide you with excellent care. Hearing back from our patients is one way we can continue to improve our services. Please take a few minutes to complete the written survey that you may receive in the mail after your visit with us. Thank you!             Your Updated Medication List - Protect others around you: Learn how to safely use, store and throw away your medicines at www.disposemymeds.org.          This list is accurate as of 9/7/18  8:27 AM.  Always use your most recent med list.                   Brand Name Dispense Instructions for use Diagnosis    ACETAMINOPHEN 8 HOUR 650 MG 8 hour tablet   Generic drug:  acetaminophen     100 tablet    Take 1 tablet by mouth every 8 hours as needed    Left shoulder pain, Shoulder joint pain, left       cetirizine 10 MG tablet    zyrTEC    90 tablet    Take 1 tablet (10 mg) by mouth daily    Environmental and seasonal allergies       diclofenac 1 % Gel topical gel    VOLTAREN    100 g    Apply 4 grams to knees or 2 grams to hands four times daily using enclosed dosing card.    Left lateral epicondylitis       lisinopril-hydrochlorothiazide 20-12.5 MG per tablet    PRINZIDE/ZESTORETIC    90 tablet    Take 1 tablet by mouth daily    Benign essential hypertension       naproxen 500 MG tablet    NAPROSYN    60 tablet    Take 1 tablet (500 mg) by mouth 2 times daily as needed for  moderate pain    Lateral epicondylitis of both elbows

## 2018-09-13 ENCOUNTER — THERAPY VISIT (OUTPATIENT)
Dept: OCCUPATIONAL THERAPY | Facility: CLINIC | Age: 58
End: 2018-09-13
Payer: COMMERCIAL

## 2018-09-13 DIAGNOSIS — M25.522 LEFT ELBOW PAIN: ICD-10-CM

## 2018-09-13 DIAGNOSIS — M77.12 LEFT TENNIS ELBOW: ICD-10-CM

## 2018-09-13 PROCEDURE — 97140 MANUAL THERAPY 1/> REGIONS: CPT | Mod: GO | Performed by: OCCUPATIONAL THERAPIST

## 2018-09-13 PROCEDURE — 97035 APP MDLTY 1+ULTRASOUND EA 15: CPT | Mod: GO | Performed by: OCCUPATIONAL THERAPIST

## 2018-09-13 PROCEDURE — 97110 THERAPEUTIC EXERCISES: CPT | Mod: GO | Performed by: OCCUPATIONAL THERAPIST

## 2018-09-13 NOTE — PROGRESS NOTES
SOAP note objective information for 9/13/2018.  Please refer to the daily flowsheet for treatment today, total treatment time and time spent performing 1:1 timed codes.     Diagnosis: Left lateral epicondylitis  DOI: 07/31/18 (Date of referral. Patient reports symptoms since May)    Subjective: Feeling a bit better. I'm having less pain in the forearm, but continue to have pain at the lateral epicondyle. Using the brace for stroller, sweeping, mopping. It really helps.    Pain Level Report  VAS(0-10) 8/9/2018 8/16/18 9/13/18   At Rest: 4-5/10 3/10 4/10   With Use: 8/10 8/10; has not changed 7/10     ROM:  Pain Report:  - none    + mild    ++ moderate    +++ severe   Elbow  8/9/2018   AROM(PROM) Right Left   Ext 0 10   Flex 135 120   Sup 90 90   Pron 90 90     Wrist  8/9/2018   AROM(PROM) Right Left   Ext 60 65 +   Flex 70 55   RD 45 35   UD 30 40     Resisted Testing (Left UE)    Pain Report:  - none    + mild    ++ moderate    +++ severe    8/9/2018 8/16/18 9/13/18   Elbow Extension 5/5 +     Elbow Flexion 5/5 +     Supination 5/5 ++     Pronation 5/5 +     Wrist Flex 5/5     Wrist Ext 5/5 ++ 5/5 + 5/5  0/10   Wrist Ext   elbow extended 5/5 ++  4/5  8/10 pain   Long finger test 5/5 ++       Strength:   (Measured in pounds)  Pain Report:  - none    + mild    ++ moderate    +++ severe     8/9/2018 8/16/2018 9/13/18   Trials RIght Left Left Left   1  2  3 60  60  60 50 ++  55 ++  50 ++ 40 + 45 ++   Average: 60 51.6         Elbow Ext  8/9/2018 8/16/2018 9/13/18   Trials Right Left Left Left    65 35 +++ 37 ++ 40 ++     Next visit:    MFR  Progress exercises as pain allows

## 2018-09-13 NOTE — MR AVS SNAPSHOT
After Visit Summary   9/13/2018    Chrissy Looney    MRN: [id number]           Patient Information     Date Of Birth          1960        Visit Information        Provider Department      9/13/2018 8:00 AM [facility]        Today's Diagnoses     Left tennis elbow        Left elbow pain           Follow-ups after your visit        Your next 10 appointments already scheduled     Sep 18, 2018  8:40 AM CDT   RETURN ORTHO with Akilah Mckenzie MD   [facility] ([facility])    [facility] [address] [place] [address],  [address]  [place] [address]   [phone]              Who to contact     If you have questions or need follow up information about today's clinic visit or your schedule please contact [facility] directly at [phone].  Normal or non-critical lab and imaging results will be communicated to you by MyChart, letter or phone within 4 business days after the clinic has received the results. If you do not hear from us within 7 days, please contact the clinic through MyChart or phone. If you have a critical or abnormal lab result, we will notify you by phone as soon as possible.  Submit refill requests through [employer] or call your pharmacy and they will forward the refill request to us. Please allow 3 business days for your refill to be completed.          Additional Information About Your Visit        Care EveryWhere ID     This is your Care EveryWhere ID. This could be used by other organizations to access your [place] medical records  [id number]        Your Vitals Were     Last Period                   11/04/2013            Blood Pressure from Last 3 Encounters:   09/07/18 146/84   07/31/18 138/89   07/19/18 136/88    Weight from Last 3 Encounters:   09/07/18 76.2 kg (168 lb)   07/31/18 74.9 kg (165 lb 3.2 oz)   07/19/18 75.1 kg (165 lb 9.6 oz)              We Performed the Following     MANUAL THER TECH,1+REGIONS,EA 15 MIN      THERAPEUTIC EXERCISES     ULTRASOUND THERAPY        Primary Care Provider Office Phone # Fax #    Olga Koenig, APRN [phone] [address] [facility] [address]        Equal Access to Services     CHUY CAMACHO : Hadgilmer velásquez flaquitoo Somariangelali, waaxda luqadaha, qaybta kaalmada adeegyada, brigitte santamaria laEstelastan welsh. So [facility] [phone].    ATENCIÓN: Si habla español, tiene a enriquez disposición servicios gratuitos de asistencia lingüística. Llame al [phone].    We comply with applicable federal civil rights laws and [place] laws. We do not discriminate on the basis of race, color, national origin, age, disability, sex, sexual orientation, or gender identity.            Thank you!     Thank you for choosing [facility]  for your care. Our goal is always to provide you with excellent care. Hearing back from our patients is one way we can continue to improve our services. Please take a few minutes to complete the written survey that you may receive in the mail after your visit with us. Thank you!             Your Updated Medication List - Protect others around you: Learn how to safely use, store and throw away your medicines at www.disposemymeds.org.          This list is accurate as of 9/13/18  8:40 AM.  Always use your most recent med list.                   Brand Name Dispense Instructions for use Diagnosis    ACETAMINOPHEN 8 HOUR 650 MG 8 hour tablet   Generic drug:  acetaminophen     100 tablet    Take 1 tablet by mouth every 8 hours as needed    Left shoulder pain, Shoulder joint pain, left       cetirizine 10 MG tablet    zyrTEC    90 tablet    Take 1 tablet (10 mg) by mouth daily    Environmental and seasonal allergies       diclofenac 1 % Gel topical gel    VOLTAREN    100 g    Apply 4 grams to knees or 2 grams to hands four times daily using enclosed dosing card.    Left lateral epicondylitis       lisinopril-hydrochlorothiazide 20-12.5 MG per tablet     PRINZIDE/ZESTORETIC    90 tablet    Take 1 tablet by mouth daily    Benign essential hypertension       naproxen 500 MG tablet    NAPROSYN    60 tablet    Take 1 tablet (500 mg) by mouth 2 times daily as needed for moderate pain    Lateral epicondylitis of both elbows

## 2018-09-18 ENCOUNTER — OFFICE VISIT (OUTPATIENT)
Dept: FAMILY MEDICINE | Facility: CLINIC | Age: 58
End: 2018-09-18
Payer: COMMERCIAL

## 2018-09-18 VITALS
HEART RATE: 84 BPM | RESPIRATION RATE: 16 BRPM | WEIGHT: 167.6 LBS | SYSTOLIC BLOOD PRESSURE: 130 MMHG | TEMPERATURE: 97.9 F | DIASTOLIC BLOOD PRESSURE: 86 MMHG | BODY MASS INDEX: 31.67 KG/M2 | OXYGEN SATURATION: 98 %

## 2018-09-18 DIAGNOSIS — M77.11 LATERAL EPICONDYLITIS OF BOTH ELBOWS: Primary | ICD-10-CM

## 2018-09-18 DIAGNOSIS — M77.12 LATERAL EPICONDYLITIS OF BOTH ELBOWS: Primary | ICD-10-CM

## 2018-09-18 ASSESSMENT — ENCOUNTER SYMPTOMS
JOINT SWELLING: 0
WEAKNESS: 1
MYALGIAS: 0
CHILLS: 0
FEVER: 0

## 2018-09-18 NOTE — MR AVS SNAPSHOT
After Visit Summary   9/18/2018    Chrissy Looney    MRN: [id number]           Patient Information     Date Of Birth          1960        Visit Information        Provider Department      9/18/2018 8:40 AM Akilah Mckenzie MD [facility]        Today's Diagnoses     Lateral epicondylitis of both elbows    -  1      Care Instructions    Here is the plan from today's visit    1. Lateral epicondylitis of both elbows    Continue to work with OT    Consider injections in future          Please call or return to clinic if your symptoms don't go away.    Follow up plan      Thank you for coming to [facility] today.  Lab Testing:  **If you had lab testing today and your results are reassuring or normal they will be mailed to you or sent through [employer] within 7 days.   **If the lab tests need quick action we will call you with the results.  The phone number we will call with results is # [phone] (home) . If this is not the best number please call our clinic and change the number.  Medication Refills:  If you need any refills please call your pharmacy and they will contact us.   If you need to  your refill at a new pharmacy, please contact the new pharmacy directly. The new pharmacy will help you get your medications transferred faster.   Scheduling:  If you have any concerns about today's visit or wish to schedule another appointment please call our office during normal business hours [phone] (8-5:00 M-F)  If a referral was made to a [facility] and you don't get a call from central scheduling please call [phone].  If a Mammogram was ordered for you at [facility] call [phone] to schedule or change your appointment.  If you had an XRay/CT/Ultrasound/MRI ordered the number is [phone] to schedule or change your radiology appointment.   Medical Concerns:  If you have urgent medical concerns please call [phone]  at any time of the day.    Akilah Mckenzie MD          Follow-ups after your visit        Who to contact     Please call your clinic at [phone] to:    Ask questions about your health    Make or cancel appointments    Discuss your medicines    Learn about your test results    Speak to your doctor            Additional Information About Your Visit        Care EveryWhere ID     This is your Care EveryWhere ID. This could be used by other organizations to access your [place] medical records  [id number]        Your Vitals Were     Pulse Temperature Respirations Last Period Pulse Oximetry BMI (Body Mass Index)    84 97.9  F (36.6  C) (Oral) 16 11/04/2013 98% 31.67 kg/m2       Blood Pressure from Last 3 Encounters:   09/18/18 130/86   09/07/18 146/84   07/31/18 138/89    Weight from Last 3 Encounters:   09/18/18 167 lb 9.6 oz (76 kg)   09/07/18 168 lb (76.2 kg)   07/31/18 165 lb 3.2 oz (74.9 kg)              Today, you had the following     No orders found for display         Today's Medication Changes          These changes are accurate as of 9/18/18  9:12 AM.  If you have any questions, ask your nurse or doctor.               Start taking these medicines.        Dose/Directions    order for DME   Used for:  Lateral epicondylitis of both elbows   Started by:  Akilah Mckenzie MD        Equipment being ordered: TENS   Quantity:  1 Units   Refills:  0            Where to get your medicines      Some of these will need a paper prescription and others can be bought over the counter.  Ask your nurse if you have questions.     Bring a paper prescription for each of these medications     order for DME                Primary Care Provider Office Phone # Fax #    MAE Conroy [phone] [address]  [place] [address]        Equal Access to Services     CHUY CAMACHO AH: Colleen Butcher, efrain fowler, brigitte kumar  scottdenissenorma lockeaarichard ah. So [facility] [phone].    ATENCIÓN: Si ruben valadez, tiene a enriquez disposición servicios gratuitos de asistencia lingüística. Eriberto lazo [phone].    We comply with applicable federal civil rights laws and [place] laws. We do not discriminate on the basis of race, color, national origin, age, disability, sex, sexual orientation, or gender identity.            Thank you!     Thank you for choosing [facility]  for your care. Our goal is always to provide you with excellent care. Hearing back from our patients is one way we can continue to improve our services. Please take a few minutes to complete the written survey that you may receive in the mail after your visit with us. Thank you!             Your Updated Medication List - Protect others around you: Learn how to safely use, store and throw away your medicines at www.disposemymeds.org.          This list is accurate as of 9/18/18  9:12 AM.  Always use your most recent med list.                   Brand Name Dispense Instructions for use Diagnosis    ACETAMINOPHEN 8 HOUR 650 MG 8 hour tablet   Generic drug:  acetaminophen     100 tablet    Take 1 tablet by mouth every 8 hours as needed    Left shoulder pain, Shoulder joint pain, left       cetirizine 10 MG tablet    zyrTEC    90 tablet    Take 1 tablet (10 mg) by mouth daily    Environmental and seasonal allergies       diclofenac 1 % Gel topical gel    VOLTAREN    100 g    Apply 4 grams to knees or 2 grams to hands four times daily using enclosed dosing card.    Left lateral epicondylitis       lisinopril-hydrochlorothiazide 20-12.5 MG per tablet    PRINZIDE/ZESTORETIC    90 tablet    Take 1 tablet by mouth daily    Benign essential hypertension       naproxen 500 MG tablet    NAPROSYN    60 tablet    Take 1 tablet (500 mg) by mouth 2 times daily as needed for moderate pain    Lateral epicondylitis of both elbows       order for DME     1 Units    Equipment being ordered: TENS     Lateral epicondylitis of both elbows

## 2018-09-18 NOTE — PATIENT INSTRUCTIONS
Here is the plan from today's visit    1. Lateral epicondylitis of both elbows    Continue to work with OT    Consider injections in future          Please call or return to clinic if your symptoms don't go away.    Follow up plan      Thank you for coming to [facility] today.  Lab Testing:  **If you had lab testing today and your results are reassuring or normal they will be mailed to you or sent through [employer] within 7 days.   **If the lab tests need quick action we will call you with the results.  The phone number we will call with results is # [phone] (home) . If this is not the best number please call our clinic and change the number.  Medication Refills:  If you need any refills please call your pharmacy and they will contact us.   If you need to  your refill at a new pharmacy, please contact the new pharmacy directly. The new pharmacy will help you get your medications transferred faster.   Scheduling:  If you have any concerns about today's visit or wish to schedule another appointment please call our office during normal business hours [phone] (8-5:00 M-F)  If a referral was made to a [facility] and you don't get a call from central scheduling please call [phone].  If a Mammogram was ordered for you at [facility] call [phone] to schedule or change your appointment.  If you had an XRay/CT/Ultrasound/MRI ordered the number is [phone] to schedule or change your radiology appointment.   Medical Concerns:  If you have urgent medical concerns please call [phone] at any time of the day.    Akilah Mckenzie MD

## 2018-09-18 NOTE — PROGRESS NOTES
HPI       Chrissy Looney is a 58 year old  who presents for   Chief Complaint   Patient presents with     RECHECK     bilateral elbow pain has improved since last visit. Achey pain all day. PT has helped per pt. Brace helps with activites. No numbness or tingling.           Arthritis Follow Up,  Diagnosis:Bilateral medial and lateral epicondylosis     Right hand dominant    Update since last visit: working with OT once weekly, pain improving  Current Treatment for joint pain: naproxen (Anaprox, Naprosyn, Naprelan)     Acetaminophen?:no     Exercise/PT? Yes Details: OT weekly   Effectiveness of treatment: improved.    Description:   Location(s): bilateral elbows, left worse than right. Most prominent over lateral epicondyle  Character: Dull ache  Morning Stiffness?:no    Intensity: moderate    Joint swelling No     Joint redness  No     Worsened by    Overuse?: Yes Details: picking up grandson    Alleviating factors:  Improved by: heat and NSAID, and using brace  Any medication monitoring needed? No     Therapies Tried and outcome: OT, braces        Adherence and Exercise  Medication side effects: not asked  How often is a medication missed? Less than 1 time per week  Exercise:walking  6-7 days/week for an average of 15-30 minutes        +++++++      Problem, Medication and Allergy Lists were reviewed and updated if needed..    Patient is an established patient of this clinic..         Review of Systems:   Review of Systems   Constitutional: Negative for chills and fever.   Musculoskeletal: Negative for joint swelling and myalgias.   Neurological: Positive for weakness (with  strength).            Physical Exam:     Vitals:    09/18/18 0831 09/18/18 0833   BP: 135/90 130/86   Pulse: 84    Resp: 16    Temp: 97.9  F (36.6  C)    TempSrc: Oral    SpO2: 98%    Weight: 167 lb 9.6 oz (76 kg)      Body mass index is 31.67 kg/(m^2).  Vitals were reviewed and were normal     Physical Exam   Constitutional: She appears  well-developed and well-nourished. No distress.   HENT:   Head: Normocephalic and atraumatic.   Musculoskeletal:        Right elbow: She exhibits no swelling and no effusion. Tenderness found. Lateral epicondyle tenderness noted.        Left elbow: She exhibits no swelling and no effusion. Tenderness found. Medial epicondyle and lateral epicondyle tenderness noted.   Skin: She is not diaphoretic.   Psychiatric: She has a normal mood and affect. Her behavior is normal.            Results:       Assessment and Plan        1. Bilateral lateral epicondylosis/ lateral elbow tendinopathy    --Patient with significant improvement with OT, would strongly recommend continuing  --Cont Naproxen and voltaren gel  --cont heat application  --Will consider prolotherapy/needling in future if symptoms are not improving       There are no discontinued medications.    Options for treatment and follow-up care were reviewed with the patient. Chrissy Looney  engaged in the decision making process and verbalized understanding of the options discussed and agreed with the final plan.      This note is scribed by Jet Olivera DO on behalf of AKILAH Lala    I have personally discussed and seen this patient.  Lifting and activity modification.  Pt improving with hand therapy so she will call insurance to see if there is approval for further visits.  I agree with the above documentation.  Akilah Mckenzie MD

## 2018-11-01 ENCOUNTER — OFFICE VISIT (OUTPATIENT)
Dept: FAMILY MEDICINE | Facility: CLINIC | Age: 58
End: 2018-11-01
Payer: COMMERCIAL

## 2018-11-01 VITALS
OXYGEN SATURATION: 99 % | HEART RATE: 87 BPM | RESPIRATION RATE: 16 BRPM | BODY MASS INDEX: 32.23 KG/M2 | TEMPERATURE: 97.9 F | SYSTOLIC BLOOD PRESSURE: 138 MMHG | WEIGHT: 170.6 LBS | DIASTOLIC BLOOD PRESSURE: 90 MMHG

## 2018-11-01 DIAGNOSIS — K29.00 ACUTE GASTRITIS WITHOUT HEMORRHAGE, UNSPECIFIED GASTRITIS TYPE: ICD-10-CM

## 2018-11-01 DIAGNOSIS — N76.0 BACTERIAL VAGINOSIS: ICD-10-CM

## 2018-11-01 DIAGNOSIS — B96.89 BACTERIAL VAGINOSIS: ICD-10-CM

## 2018-11-01 DIAGNOSIS — N95.2 ATROPHIC VAGINITIS: ICD-10-CM

## 2018-11-01 DIAGNOSIS — Z12.4 SCREENING FOR CERVICAL CANCER: ICD-10-CM

## 2018-11-01 DIAGNOSIS — N94.89 VAGINAL BURNING: Primary | ICD-10-CM

## 2018-11-01 DIAGNOSIS — Z23 ENCOUNTER FOR IMMUNIZATION: ICD-10-CM

## 2018-11-01 LAB
BACTERIA: NORMAL
BILIRUBIN UR: NEGATIVE
BLOOD UR: NEGATIVE
CLUE CELLS: NORMAL
GLUCOSE URINE: NEGATIVE
KETONES UR QL: NEGATIVE
LEUKOCYTE ESTERASE UR: NEGATIVE
MOTILE TRICHOMONAS: NEGATIVE
NITRITE UR QL STRIP: NEGATIVE
ODOR: NORMAL
PH UR STRIP: 6 [PH] (ref 5–7)
PH WET PREP: >4.5
PROTEIN UR: NEGATIVE
SP GR UR STRIP: 1.02
UROBILINOGEN UR STRIP-ACNC: NORMAL
WBC WET PREP: NORMAL
YEAST: NORMAL

## 2018-11-01 RX ORDER — ESTRADIOL 2 MG/1
2 TABLET ORAL DAILY
Qty: 30 TABLET | Refills: 0 | Status: SHIPPED | OUTPATIENT
Start: 2018-11-01 | End: 2018-11-29

## 2018-11-01 RX ORDER — METRONIDAZOLE 500 MG/1
500 TABLET ORAL 2 TIMES DAILY
Qty: 14 TABLET | Refills: 0 | Status: SHIPPED | OUTPATIENT
Start: 2018-11-01 | End: 2019-08-13

## 2018-11-01 ASSESSMENT — ENCOUNTER SYMPTOMS
COLOR CHANGE: 0
BLOOD IN STOOL: 0
DIARRHEA: 0
NAUSEA: 0
MYALGIAS: 1
ABDOMINAL PAIN: 1
VOMITING: 0
ARTHRALGIAS: 1

## 2018-11-01 NOTE — PROGRESS NOTES
Wet prep result significant for > 20% clue cells. Negative for yeast and trichomonas. Called Chrissy to relay diagnosis of bacterial vaginosis and will order 1 week of flagyl bid to treat. Counseled her that the treatment advice for atrophic vaginitis still applied, and that she should still come back for a pap.    Kenroy Bautista, DO

## 2018-11-01 NOTE — MR AVS SNAPSHOT
After Visit Summary   11/1/2018    Chrissy Looney    MRN: [id number]           Patient Information     Date Of Birth          1960        Visit Information        Provider Department      11/1/2018 8:00 AM [facility]        Today's Diagnoses     Vaginal burning    -  1    Screening for cervical cancer        Acute gastritis without hemorrhage, unspecified gastritis type        Atrophic vaginitis          Care Instructions    Here is the plan from today's visit  I believe your symptoms are being caused by drying of the vaginal tissue that happens after menopause. For this, you can keep using the steroid cream. I'm also prescribing estrogen tablets to place in the vagina daily for at least 2 weeks. This should help your symptoms. After 2 weeks of the tablets, I would like you to come back for a pap smear to get a good sample. Also, your urine was negative for a bladder infection.    I also believe your abdominal pain is stomach irritation from the naproxen you take, so I am prescribing an anti-acid medicine called zantac to help your stomach heal.    1. Vaginal burning  Atrophic vaginitis  - Urinalysis, Micro If (UA) ([facility])  - Wet Prep ([facility])  - estradiol (ESTRACE) 2 MG tablet; Place 1 tablet (2 mg) vaginally daily  Dispense: 30 tablet; Refill: 0    2. Acute gastritis without hemorrhage, unspecified gastritis type  - ranitidine (ZANTAC) 150 MG tablet; Take 1 tablet (150 mg) by mouth 2 times daily  Dispense: 60 tablet; Refill: 1      Please call or return to clinic if your symptoms don't go away.    Follow up plan  Please make a clinic appointment for follow up with me (MACHELLE MAYER) in 2  weeks for pap smear.    Thank you for coming to [facility] today.  Lab Testing:  **If you had lab testing today and your results are reassuring or normal they will be mailed to you or sent through [facility] within 7 days.   **If the lab tests need quick  action we will call you with the results.  The phone number we will call with results is # [phone] (home) . If this is not the best number please call our clinic and change the number.  Medication Refills:  If you need any refills please call your pharmacy and they will contact us.   If you need to  your refill at a new pharmacy, please contact the new pharmacy directly. The new pharmacy will help you get your medications transferred faster.   Scheduling:  If you have any concerns about today's visit or wish to schedule another appointment please call our office during normal business hours [phone] (8-5:00 M-F)  If a referral was made to a [facility] and you don't get a call from central scheduling please call [phone].  If a Mammogram was ordered for you at [facility] call [phone] to schedule or change your appointment.  If you had an XRay/CT/Ultrasound/MRI ordered the number is [phone] to schedule or change your radiology appointment.   Medical Concerns:  If you have urgent medical concerns please call [phone] at any time of the day.    Kenroy Bautista, DO            Follow-ups after your visit        Follow-up notes from your care team     Return in about 2 weeks (around 11/15/2018) for pap smear.      Who to contact     Please call your clinic at [phone] to:    Ask questions about your health    Make or cancel appointments    Discuss your medicines    Learn about your test results    Speak to your doctor            Additional Information About Your Visit        Care EveryWhere ID     This is your Care EveryWhere ID. This could be used by other organizations to access your [facility] medical records  [id number]        Your Vitals Were     Pulse Temperature Respirations Last Period Pulse Oximetry BMI (Body Mass Index)    87 97.9  F (36.6  C) (Oral) 16 11/04/2013 99% 32.23 kg/m2       Blood Pressure from Last 3 Encounters:   11/01/18 138/90    09/18/18 130/86   09/07/18 146/84    Weight from Last 3 Encounters:   11/01/18 170 lb 9.6 oz (77.4 kg)   09/18/18 167 lb 9.6 oz (76 kg)   09/07/18 168 lb (76.2 kg)              We Performed the Following     Urinalysis, Micro If (UA) ([facility])     Wet Prep ([facility])          Today's Medication Changes          These changes are accurate as of 11/1/18  8:42 AM.  If you have any questions, ask your nurse or doctor.               Start taking these medicines.        Dose/Directions    estradiol 2 MG tablet   Commonly known as:  ESTRACE   Used for:  Atrophic vaginitis   Started by:  Britney Bautista        Dose:  2 mg   Place 1 tablet (2 mg) vaginally daily   Quantity:  30 tablet   Refills:  0       ranitidine 150 MG tablet   Commonly known as:  ZANTAC   Used for:  Acute gastritis without hemorrhage, unspecified gastritis type   Started by:  Britney Bautista        Dose:  150 mg   Take 1 tablet (150 mg) by mouth 2 times daily   Quantity:  60 tablet   Refills:  1            Where to get your medicines      These medications were sent to [facility] [place], [place] - [address] [facility]  [address] [place] [address]     Phone:  [phone]     estradiol 2 MG tablet    ranitidine 150 MG tablet                Primary Care Provider Office Phone # Fax #    Britney Medel [phone]       [facility] [address] [place] [id number]        Equal Access to Services     CHUY CAMACHO AH: Colleen huddlestono Sochelly, waaxda luqadaha, qaybta kaalmada adeegyada, waxay don daniels . So [facility] [phone].    ATENCIÓN: Si habla español, tiene a enriquez disposición servicios gratuitos de asistencia lingüística. Llame al [phone].    We comply with applicable federal civil rights laws and [place] laws. We do not discriminate on the basis of race, color, national origin, age, disability, sex, sexual orientation, or gender identity.            Thank you!      Thank you for choosing [facility]  for your care. Our goal is always to provide you with excellent care. Hearing back from our patients is one way we can continue to improve our services. Please take a few minutes to complete the written survey that you may receive in the mail after your visit with us. Thank you!             Your Updated Medication List - Protect others around you: Learn how to safely use, store and throw away your medicines at www.disposemymeds.org.          This list is accurate as of 11/1/18  8:42 AM.  Always use your most recent med list.                   Brand Name Dispense Instructions for use Diagnosis    ACETAMINOPHEN 8 HOUR 650 MG 8 hour tablet   Generic drug:  acetaminophen     100 tablet    Take 1 tablet by mouth every 8 hours as needed    Left shoulder pain, Shoulder joint pain, left       cetirizine 10 MG tablet    zyrTEC    90 tablet    Take 1 tablet (10 mg) by mouth daily    Environmental and seasonal allergies       diclofenac 1 % Gel topical gel    VOLTAREN    100 g    Apply 4 grams to knees or 2 grams to hands four times daily using enclosed dosing card.    Left lateral epicondylitis       estradiol 2 MG tablet    ESTRACE    30 tablet    Place 1 tablet (2 mg) vaginally daily    Atrophic vaginitis       lisinopril-hydrochlorothiazide 20-12.5 MG per tablet    PRINZIDE/ZESTORETIC    90 tablet    Take 1 tablet by mouth daily    Benign essential hypertension       naproxen 500 MG tablet    NAPROSYN    60 tablet    Take 1 tablet (500 mg) by mouth 2 times daily as needed for moderate pain    Lateral epicondylitis of both elbows       ranitidine 150 MG tablet    ZANTAC    60 tablet    Take 1 tablet (150 mg) by mouth 2 times daily    Acute gastritis without hemorrhage, unspecified gastritis type

## 2018-11-01 NOTE — PROGRESS NOTES
Preceptor Attestation:   Patient seen, evaluated and discussed with the resident. I have verified the content of the note, which accurately reflects my assessment of the patient and the plan of care.   Supervising Physician:  Padilla Castillo MD

## 2018-11-01 NOTE — PATIENT INSTRUCTIONS
Here is the plan from today's visit  I believe your symptoms are being caused by drying of the vaginal tissue that happens after menopause. For this, you can keep using the steroid cream. I'm also prescribing estrogen tablets to place in the vagina daily for at least 2 weeks. This should help your symptoms. After 2 weeks of the tablets, I would like you to come back for a pap smear to get a good sample. Also, your urine was negative for a bladder infection.    I also believe your abdominal pain is stomach irritation from the naproxen you take, so I am prescribing an anti-acid medicine called zantac to help your stomach heal.    1. Vaginal burning  Atrophic vaginitis  - Urinalysis, Micro If (UA) ([facility])  - Wet Prep ([facility])  - estradiol (ESTRACE) 2 MG tablet; Place 1 tablet (2 mg) vaginally daily  Dispense: 30 tablet; Refill: 0    2. Acute gastritis without hemorrhage, unspecified gastritis type  - ranitidine (ZANTAC) 150 MG tablet; Take 1 tablet (150 mg) by mouth 2 times daily  Dispense: 60 tablet; Refill: 1      Please call or return to clinic if your symptoms don't go away.    Follow up plan  Please make a clinic appointment for follow up with me (MACHELLE MAYER) in 2  weeks for pap smear.    Thank you for coming to [facility] today.  Lab Testing:  **If you had lab testing today and your results are reassuring or normal they will be mailed to you or sent through c[facility]ea within 7 days.   **If the lab tests need quick action we will call you with the results.  The phone number we will call with results is # [phone] (home) . If this is not the best number please call our clinic and change the number.  Medication Refills:  If you need any refills please call your pharmacy and they will contact us.   If you need to  your refill at a new pharmacy, please contact the new pharmacy directly. The new pharmacy will help you get your medications transferred faster.   Scheduling:  If you have any  concerns about today's visit or wish to schedule another appointment please call our office during normal business hours [phone] (8-5:00 M-F)  If a referral was made to a [facility] and you don't get a call from central scheduling please call [phone].  If a Mammogram was ordered for you at [facility] call [phone] to schedule or change your appointment.  If you had an XRay/CT/Ultrasound/MRI ordered the number is [phone] to schedule or change your radiology appointment.   Medical Concerns:  If you have urgent medical concerns please call [phone] at any time of the day.    Kenroy Bautista, DO

## 2018-11-01 NOTE — PROGRESS NOTES
HPI       Chrissy Looney is a 58 year old  who presents for   Chief Complaint   Patient presents with     Vaginal Problem     vaginal irritation/ burning sensation x's 1 week with upper abdominal pain. NO discharge, odor or itching of the area       Vaginal Symptoms  Onset: 1 week    Description:  Vaginal Discharge: none   Itching (Pruritis): no  Burning sensation: Yes Details: always, worse with urination  Odor: no    Accompanying Signs & Symptoms:  Pain with Urination:Yes  Abdominal Pain: Yes Details: epigastric for 1 week too, helped with tums  Fever: no    History:   Sexually active: Yes Details: monogamous with  of 36 years. Has not had sex since symptoms started  New Partner: No  Possibility of Pregnancy:  No tubes tied years ago. Menopause occurred at 56.    Precipitating factors:   Recent Antibiotic Use: no     Alleviating factors:  Steroid cream (triamcinolone) - feels similar to before when it was prescribed  Therapies Tried and outcome: has used soap on vagina, summer's marcelo ph topical soap    +++++++    Problem, Medication and Allergy Lists were reviewed and updated if needed..    Patient is an established patient of this clinic..         Review of Systems:   Review of Systems   Gastrointestinal: Positive for abdominal pain. Negative for blood in stool, diarrhea, nausea and vomiting.   Genitourinary:        Positives and negatives per HPI   Musculoskeletal: Positive for arthralgias and myalgias.   Skin: Negative for color change and rash.            Physical Exam:     Vitals:    11/01/18 0758   BP: 138/90   Pulse: 87   Resp: 16   Temp: 97.9  F (36.6  C)   TempSrc: Oral   SpO2: 99%   Weight: 170 lb 9.6 oz (77.4 kg)     Body mass index is 32.23 kg/(m^2).  Vitals were reviewed and were normal     Physical Exam   Constitutional: She is oriented to person, place, and time. She appears well-developed and well-nourished. No distress.   HENT:   Head: Normocephalic and atraumatic.   Right Ear: External  ear normal.   Left Ear: External ear normal.   Nose: Nose normal.   Pulmonary/Chest: Effort normal. No respiratory distress.   Abdominal: Soft. She exhibits no distension. There is no tenderness.   Genitourinary:       No labial fusion. There is no rash, tenderness, lesion or injury on the right labia. There is no rash, tenderness, lesion or injury on the left labia. No erythema or tenderness in the vagina. No foreign body in the vagina. No vaginal discharge found.       Neurological: She is alert and oriented to person, place, and time.   Skin: Skin is warm and dry. No rash noted. She is not diaphoretic.   Psychiatric: She has a normal mood and affect.         Results:      Results from this visit  Results for orders placed or performed in visit on 11/01/18   Urinalysis, Micro If (UA) ([facility])   Result Value Ref Range    Specific Gravity Urine 1.020 1.005 - 1.030    pH Urine 6.0 4.5 - 8.0    Leukocyte Esterase UR Negative NEGATIVE    Nitrite Urine Negative NEGATIVE    Protein UR Negative NEGATIVE    Glucose Urine Negative NEGATIVE    Ketones Urine Negative NEGATIVE    Urobilinogen mg/dL 0.2 E.U./dL 0.2 E.U./dL    Bilirubin UR Negative NEGATIVE    Blood UR Negative NEGATIVE     Wet prep ordered and pending    Assessment and Plan      Chrissy Looney is a 59 yo postmenopausal woman who presents with vaginal and vulvar burning. I believe her symptoms are largely due to atrophic vaginitis, given her history and exam. The differential also includes BV. She is at low risk for an STI, and declines GC/chlamydia testing today. Her urine was negative for any cystitis. She can continue her topical triamcinolone and I will also add a limited course of daily estradiol vaginal tablet. I have also advised her to use plenty of lubrication with sexual activity. Her epigastric pain is likely a mild NSAID-induced gastritis, which I will treat with zantac. If her symptoms persist or she has any signs of GI bleeding, we will need to  discontinue the naproxen and consider PPI or EGD.    I attempted to obtain a pap sample today since she is due in January, but I canceled this attempt because the sample would likely be inadequate given the friability and atrophy of her vaginal tissue. However, I briefly visualize what looked to be a possible papular lesion within the vagina near the cervix, which would warrant further investigation when she returns for her pap in 2 weeks on the vaginal estrogen.    1. Vaginal burning  Atrophic vaginitis  - estradiol (ESTRACE) 2 MG tablet; Place 1 tablet (2 mg) vaginally daily  Dispense: 30 tablet; Refill: 0  - Urinalysis, Micro If (UA) ([facility])  - Wet Prep ([facility])    2.  Acute gastritis without hemorrhage, unspecified gastritis type  - ranitidine (ZANTAC) 150 MG tablet; Take 1 tablet (150 mg) by mouth 2 times daily  Dispense: 60 tablet; Refill: 1         There are no discontinued medications.    Options for treatment and follow-up care were reviewed with the patient. Chrissy Looney  engaged in the decision making process and verbalized understanding of the options discussed and agreed with the final plan.    Kenroy Bautista DO

## 2018-11-01 NOTE — NURSING NOTE
Injectable influenza vaccine documentation    1. Has the patient received the information for the influenza vaccine? YES    2. Does the patient have a severe allergy to eggs (Patients with a severe egg allergy should be assessed by a medical provider, RN, or clinical pharmacist. If they receive the influenza vaccine, please have them observed for 15 minutes.)? No    3. Has the patient had an allergic reaction to previous influenza vaccines? No    4. Has the patient had any severe allergic reactions to past influenza vaccines ? No       5. Does patient have a history of Guillain-Gresham syndrome? No      Based on responses above, I administered the influenza vaccine.  Doris Lin, CMA

## 2018-11-29 ENCOUNTER — OFFICE VISIT (OUTPATIENT)
Dept: FAMILY MEDICINE | Facility: CLINIC | Age: 58
End: 2018-11-29
Payer: COMMERCIAL

## 2018-11-29 VITALS
TEMPERATURE: 97.9 F | BODY MASS INDEX: 32.88 KG/M2 | WEIGHT: 174 LBS | OXYGEN SATURATION: 97 % | SYSTOLIC BLOOD PRESSURE: 136 MMHG | HEART RATE: 88 BPM | DIASTOLIC BLOOD PRESSURE: 81 MMHG

## 2018-11-29 DIAGNOSIS — Z12.31 VISIT FOR SCREENING MAMMOGRAM: ICD-10-CM

## 2018-11-29 DIAGNOSIS — N95.2 ATROPHIC VAGINITIS: Primary | ICD-10-CM

## 2018-11-29 DIAGNOSIS — K21.9 GASTROESOPHAGEAL REFLUX DISEASE, ESOPHAGITIS PRESENCE NOT SPECIFIED: ICD-10-CM

## 2018-11-29 DIAGNOSIS — Z12.4 SCREENING FOR CERVICAL CANCER: ICD-10-CM

## 2018-11-29 DIAGNOSIS — Z87.891 PERSONAL HISTORY OF TOBACCO USE: ICD-10-CM

## 2018-11-29 DIAGNOSIS — Z00.00 ROUTINE GENERAL MEDICAL EXAMINATION AT A HEALTH CARE FACILITY: ICD-10-CM

## 2018-11-29 RX ORDER — ESTRADIOL 2 MG/1
2 TABLET ORAL DAILY
Qty: 30 TABLET | Refills: 0 | Status: SHIPPED | OUTPATIENT
Start: 2018-11-29 | End: 2018-12-12 | Stop reason: ALTCHOICE

## 2018-11-29 NOTE — PATIENT INSTRUCTIONS
Preventive Health Recommendations  Female Ages 50 - 64    Yearly exam: See your health care provider every year in order to  o Review health changes.   o Discuss preventive care.    o Review your medicines if your doctor has prescribed any.      Get a Pap test every three years (unless you have an abnormal result and your provider advises testing more often).    If you get Pap tests with HPV test, you only need to test every 5 years, unless you have an abnormal result.     You do not need a Pap test if your uterus was removed (hysterectomy) and you have not had cancer.    You should be tested each year for STDs (sexually transmitted diseases) if you're at risk.     Have a mammogram every 1 to 2 years.    Have a colonoscopy at age 50, or have a yearly FIT test (stool test). These exams screen for colon cancer.      Have a cholesterol test every 5 years, or more often if advised.    Have a diabetes test (fasting glucose) every three years. If you are at risk for diabetes, you should have this test more often.     If you are at risk for osteoporosis (brittle bone disease), think about having a bone density scan (DEXA).    Shots: Get a flu shot each year. Get a tetanus shot every 10 years.    Nutrition:     Eat at least 5 servings of fruits and vegetables each day.    Eat whole-grain bread, whole-wheat pasta and brown rice instead of white grains and rice.    Get adequate Calcium and Vitamin D.     Lifestyle    Exercise at least 150 minutes a week (30 minutes a day, 5 days a week). This will help you control your weight and prevent disease.    Limit alcohol to one drink per day.    No smoking.     Wear sunscreen to prevent skin cancer.     See your dentist every six months for an exam and cleaning.    See your eye doctor every 1 to 2 years.  Here is the plan from today's visit    1. Routine general medical examination at a health care facility    - Pap imaged thin layer screen with HPV - recommended age 30 - 65 years  (select HPV order below)  - HPV High Risk Types DNA Cervical    2. Visit for screening mammogram  Get your mammogram in january    3. Screening for cervical cancer    - Pap imaged thin layer screen with HPV - recommended age 30 - 65 years (select HPV order below)  - HPV High Risk Types DNA Cervical    4. Atrophic vaginitis    - estradiol (ESTRACE) 2 MG tablet; Place 1 tablet (2 mg) vaginally daily  Dispense: 30 tablet; Refill: 0    5. Gastroesophageal reflux disease, esophagitis presence not specified    - omeprazole (PRILOSEC) 20 MG DR capsule; Take 1 capsule (20 mg) by mouth daily  Dispense: 90 capsule; Refill: 1      Please call or return to clinic if your symptoms don't go away.    Follow up plan  Please make a clinic appointment for follow up with me (MACHELLE MAYER) in 1  month for GERD.    Thank you for coming to [facility] today.  Lab Testing:  **If you had lab testing today and your results are reassuring or normal they will be mailed to you or sent through [employer] within 7 days.   **If the lab tests need quick action we will call you with the results.  The phone number we will call with results is # [phone] (home) . If this is not the best number please call our clinic and change the number.  Medication Refills:  If you need any refills please call your pharmacy and they will contact us.   If you need to  your refill at a new pharmacy, please contact the new pharmacy directly. The new pharmacy will help you get your medications transferred faster.   Scheduling:  If you have any concerns about today's visit or wish to schedule another appointment please call our office during normal business hours [phone] (8-5:00 M-F)  If a referral was made to a [facility] and you don't get a call from central scheduling please call [phone].  If a Mammogram was ordered for you at [facility] call [phone] to schedule or change your appointment.  If you  had an XRay/CT/Ultrasound/MRI ordered the number is [phone] to schedule or change your radiology appointment.   Medical Concerns:  If you have urgent medical concerns please call [phone] at any time of the day.    Kenroy Bautista, DO

## 2018-11-29 NOTE — LETTER
December 4, 2018      Chrissy Aayush  [address] [facility] [address]        Dear Chrissy,    Thank you for getting your care at [facility]. Please see below for your test results. This shows that your pap test is normal- you don't have any pre-cancer changes to the cells in your cervix. Your next pap can be in 5 years.    Resulted Orders   Pap imaged thin layer screen with HPV - recommended age 30 - 65 years (select HPV order below)   Result Value Ref Range    PAP NIL     Copath Report         Acc#: [id number]   Signed: 12/3/2018 13:09   MR#: [id number]    SPECIMEN/STAIN PROCESS:  Pap imaged thin layer prep screening (Surepath, FocalPoint with guided   screening)       Pap-Cyto x 1, HPV ordered x 1    SOURCE: Cervical, endocervical  ----------------------------------------------------------------   Pap imaged thin layer prep screening (Surepath, FocalPoint with guided   screening)  SPECIMEN ADEQUACY:  Satisfactory for evaluation.  -Transformation zone component present.    CYTOLOGIC INTERPRETATION:    Negative for intraepithelial lesion or malignancy    Electronically signed by:  RICH Willis (ASCP)    CLINICAL HISTORY:  LMP: 11/4/13  Post Menopausal  Hormone Replacement Therapy: Topical, prior to pap, A previous normal pap  Date of Last Pap: 1/13/14,    Papanicolaou Test Limitations:  Cervical cytology is a screening test with   limited sensitivity; regular  screening is critical for cancer prevention; Pap tests are primarily   effective for the diagnosis/prevention  of  squamous cell carcinoma, not adenocarcinomas or other cancers.  TESTING LAB LOCATION:  [facility], [address] [address], [phone]  Processed and screened at [facility],   [facility]     HPV High Risk Types DNA Cervical   Result Value Ref Range    HPV Source SurePath     HPV 16 DNA Negative NEG^Negative     HPV 18 DNA Negative NEG^Negative    Other HR HPV Positive (A) NEG^Negative    Final Diagnosis       This patient's sample is positive for other HR HPV DNA (types 31, 33, 35, 39, 45, 51, 52,   56, 58, 59, 66 or 68), not HPV 16 or HPV 18 DNA. This result requires clinical correlation   with concurrent cytology findings.        Comment:      This test was developed and its performance characteristics determined by the   [facility]. It   has not been cleared or approved by the FDA. The laboratory is regulated under   CLIA as qualified to perform high-complexity testing. This test is used for   clinical purposes. It should not be regarded as investigational or for   research.  (Note)  METHODOLOGY:  The Roche liliana 4800 system uses automated extraction,   simultaneous amplification of HPV (L1 region) and beta-globin,    followed by  real time detection of fluorescent labeled HPV and beta   globin using specific oligonucleotide probes . The test specifically   identifies types HPV 16 DNA and HPV 18 DNA while concurrently   detecting the rest of the high risk types (31, 33, 35, 39, 45, 51,   52, 56, 58, 59, 66 or 68).  COMMENTS:  This test is not intended for use as a screening device   for women under age 30 with normal cervical cytology.  Results should   be correl ated with cytologic and histologic findings. Close clinical   followup is recommended.      Specimen Description Cervical Cells       Comment:      [id number]     If you have any concerns about these results please call and leave a message for me or send a MyChart message to the clinic.    Sincerely,    Kenroy Bautista, DO

## 2018-11-29 NOTE — PROGRESS NOTES
Female Physical Note          HPI         Concerns today:   -still epigastric pain on zantac, no NSAIDs for the last month.  - lateral epicondylitis no longer bothering her now that she's not doing much to exacerbate it  - Vaginal itching and discomfort much improved after flagyl and using vaseline and ky jelly. She thinks the intra-vaginal estrogen has helped too  - Former smoker- smoked from age 14 0.5-1 ppd, quit 15 years ago.  - has a h/o cervical polyp which has been evaluated in the past    Patient Active Problem List   Diagnosis     Essential hypertension     Glucose intolerance (impaired glucose tolerance)     Prediabetes     Low back pain     Chronic maxillary sinusitis     Left tennis elbow     Left elbow pain       Past Medical History:   Diagnosis Date     NO ACTIVE PROBLEMS        Previous Medical Care   -regular follow up here     Family History   Problem Relation Age of Onset     Cancer Mother      Hypertension Mother      Diabetes Sister      Alcohol/Drug Sister      Alcohol/Drug Brother      Allergies Other      Alzheimer Disease Other      aunt     Thyroid Disease Other      daughter     Hypertension Father               Review of Systems:     Review of Systems:  CONSTITUTIONAL: NEGATIVE for fever, chills, change in weight  INTEGUMENTARY/SKIN: NEGATIVE for worrisome rashes, moles or lesions  EYES: NEGATIVE for vision changes or irritation  ENT/MOUTH: NEGATIVE for ear, mouth and throat problems  RESP: NEGATIVE for significant cough or SOB  BREAST: NEGATIVE for masses, tenderness or discharge  CV: NEGATIVE for chest pain, palpitations or peripheral edema  GI: POSITIVE for abdominal pain epigastric, dyspepsia and heartburn or reflux and NEGATIVE for constipation, diarrhea, hematemesis, hematochezia and Hx stomach or duadenal ulcer  : NEGATIVE for frequency, dysuria, or hematuria  MUSCULOSKELETAL:per HPI  NEURO: NEGATIVE for weakness, dizziness or paresthesias  ENDOCRINE: NEGATIVE for temperature  intolerance, skin/hair changes  HEME/ALLERGY: NEGATIVE for bleeding problems  PSYCHIATRIC: NEGATIVE for changes in mood or affect  Sleep:   Do you snore most or the night (as reported by a family member)? No  Do you feel sleepy or extremely tired during most of the day? No           Social History     Social History     Social History     Marital status:      Spouse name: N/A     Number of children: N/A     Years of education: N/A     Occupational History     Not on file.     Social History Main Topics     Smoking status: Former Smoker     Quit date: 4/17/2004     Smokeless tobacco: Never Used      Comment: quit 2004     Alcohol use Yes      Comment: once  a month     Drug use: Not on file     Sexual activity: Not on file     Other Topics Concern     Not on file     Social History Narrative       Marital Status:  Who lives in your household?     Has anyone hurt you physically, for example by pushing, hitting, slapping or kicking you or forcing you to have sex? Denies  Do you feel threatened or controlled by a partner, ex-partner or anyone in your life? Denies    Sexual Health     Sexual concerns: None today. Topical estrogen and lube and moisturizer doing great.   STI History: Neg  Pregnancy History: No obstetric history on file.  LMP Patient's last menstrual period was 11/04/2013. Menopausal since: 2013  Last Pap Smear Date:   Lab Results   Component Value Date    PAP NIL 01/13/2014    PAP ASC-US 01/24/2013    PAP NIL 01/22/2010     Abnormal Pap History: None and See problem list    Recommended Screening     Pap/HPV cotest every 5 years for women 30-65   Recommended and patient accepted testing. and HIV screening:  Recommended and patient declined testing.  Breast CA Screening (>41 yo or 10 y before 1st degree relative diagnosis): Testing not indicated  and Lung CA Screening with low dose CT (55 - 80, with >= 30 ppy smoking history who are current smokers or quit within last 15y - annual low  dose CT) Recommended and patient accepted testing.             Physical Exam:     Vitals: /81  Pulse 88  Temp 97.9  F (36.6  C) (Oral)  Wt 174 lb (78.9 kg)  LMP 11/04/2013  SpO2 97%  BMI 32.88 kg/m2  BMI= Body mass index is 32.88 kg/(m^2).   GENERAL: healthy, alert and no distress  EYES: Eyes grossly normal to inspection, extraocular movements - intact, and PERRL  HENT: ear canals- normal; TMs- normal; Nose- normal; Mouth- no ulcers, no lesions  NECK: no tenderness, no adenopathy, no asymmetry, no masses, no stiffness; thyroid- normal to palpation  RESP: lungs clear to auscultation - no rales, no rhonchi, no wheezes  CV: regular rates and rhythm, normal S1 S2, no S3 or S4 and no murmur, no click or rub -  ABDOMEN: tender epigastrium, mild, soft, no distension or masses  MS: extremities- no gross deformities noted, no edema  SKIN: no suspicious lesions, no rashes  NEURO: strength and tone- normal, sensory exam- grossly normal, mentation- intact, speech- normal, reflexes- symmetric  BACK: no CVA tenderness, no paralumbar tenderness  - female: Vulvar and vaginal tissue pink, nontender, without lesions. No vaginal discharge. Cervix pink, nontender, without friability. Cervical polyp at 9:00 at cervical os, mobile on palpation, consistent with cervical tissue appearance, non-friable. Uterus midline, mobile, regular, no adenexal masses.  PSYCH: Alert and oriented times 3; speech- coherent , normal rate and volume; able to articulate logical thoughts, able to abstract reason, no tangential thoughts, no hallucinations or delusions, affect- normal  LYMPHATICS: ant. cervical- normal, post. cervical- normal, axillary- normal, supraclavicular- normal, inguinal- normal      Assessment and Plan      Chrissy was seen today for physical.    Diagnoses and all orders for this visit:    Atrophic vaginitis  -     estradiol (ESTRACE) 2 MG tablet; Place 1 tablet (2 mg) vaginally daily    Routine general medical examination  at a health care facility  -     Pap imaged thin layer screen with HPV - recommended age 30 - 65 years (select HPV order below)  -     HPV High Risk Types DNA Cervical  -     Prof Fee: Shared Decision Making Visit for Lung Cancer Screening    Visit for screening mammogram    Screening for cervical cancer  -     Pap imaged thin layer screen with HPV - recommended age 30 - 65 years (select HPV order below)  -     HPV High Risk Types DNA Cervical    Gastroesophageal reflux disease, esophagitis presence not specified  -     omeprazole (PRILOSEC) 20 MG DR capsule; Take 1 capsule (20 mg) by mouth daily    Personal history of tobacco use  -     Prof Fee: Shared Decision Making Visit for Lung Cancer Screening        1. Health Care Maintenance: Normal Physical Exam      1. Patient declined HIV screen and Routine follow up in one year.  2.Contraception: not needed  3. She has a mammogram already ordered, will plan to get her mammogram in January.  4. GERD - not responsive to H2 blocker. Will trial a PPI and follow up in one month. Plan for endoscopy if no improvement.  5. Atrophic vaginitis. Will continue current topical therapy.     Options for treatment and follow-up care were reviewed with the patient . Chrissy Looney and/or guardian engaged in the decision making process and verbalized understanding of the options discussed and agreed with the final plan.    Kenroy Bautista, DO      Lung Cancer Screening Shared Decision Making Visit     Chrissy Looney is not eligible for lung cancer screening on the basis of the information provided in my signed lung cancer screening order. Chrissy's smoking history is below the threshold and so it is not recommended.    Patient is not currently a smoker and so we did discuss that the only way to prevent lung cancer is to not smoke. Smoking cessation assistance was not offered.

## 2018-11-29 NOTE — MR AVS SNAPSHOT
After Visit Summary   11/29/2018    Chrissy Looney    MRN: [id number]           Patient Information     Date Of Birth          1960        Visit Information        Provider Department      11/29/2018 8:00 AM Britney Bautista MD [facility]        Today's Diagnoses     Atrophic vaginitis    -  1    Routine general medical examination at a health care facility        Visit for screening mammogram        Screening for cervical cancer        Gastroesophageal reflux disease, esophagitis presence not specified          Care Instructions      Preventive Health Recommendations  Female Ages 50 - 64    Yearly exam: See your health care provider every year in order to  o Review health changes.   o Discuss preventive care.    o Review your medicines if your doctor has prescribed any.      Get a Pap test every three years (unless you have an abnormal result and your provider advises testing more often).    If you get Pap tests with HPV test, you only need to test every 5 years, unless you have an abnormal result.     You do not need a Pap test if your uterus was removed (hysterectomy) and you have not had cancer.    You should be tested each year for STDs (sexually transmitted diseases) if you're at risk.     Have a mammogram every 1 to 2 years.    Have a colonoscopy at age 50, or have a yearly FIT test (stool test). These exams screen for colon cancer.      Have a cholesterol test every 5 years, or more often if advised.    Have a diabetes test (fasting glucose) every three years. If you are at risk for diabetes, you should have this test more often.     If you are at risk for osteoporosis (brittle bone disease), think about having a bone density scan (DEXA).    Shots: Get a flu shot each year. Get a tetanus shot every 10 years.    Nutrition:     Eat at least 5 servings of fruits and vegetables each day.    Eat whole-grain bread, whole-wheat pasta and brown rice instead of white  grains and rice.    Get adequate Calcium and Vitamin D.     Lifestyle    Exercise at least 150 minutes a week (30 minutes a day, 5 days a week). This will help you control your weight and prevent disease.    Limit alcohol to one drink per day.    No smoking.     Wear sunscreen to prevent skin cancer.     See your dentist every six months for an exam and cleaning.    See your eye doctor every 1 to 2 years.  Here is the plan from today's visit    1. Routine general medical examination at a health care facility    - Pap imaged thin layer screen with HPV - recommended age 30 - 65 years (select HPV order below)  - HPV High Risk Types DNA Cervical    2. Visit for screening mammogram  Get your mammogram in january    3. Screening for cervical cancer    - Pap imaged thin layer screen with HPV - recommended age 30 - 65 years (select HPV order below)  - HPV High Risk Types DNA Cervical    4. Atrophic vaginitis    - estradiol (ESTRACE) 2 MG tablet; Place 1 tablet (2 mg) vaginally daily  Dispense: 30 tablet; Refill: 0    5. Gastroesophageal reflux disease, esophagitis presence not specified    - omeprazole (PRILOSEC) 20 MG DR capsule; Take 1 capsule (20 mg) by mouth daily  Dispense: 90 capsule; Refill: 1      Please call or return to clinic if your symptoms don't go away.    Follow up plan  Please make a clinic appointment for follow up with me (MACHELLE MAYER) in 1  month for GERD.    Thank you for coming to [facility] today.  Lab Testing:  **If you had lab testing today and your results are reassuring or normal they will be mailed to you or sent through [employer] within 7 days.   **If the lab tests need quick action we will call you with the results.  The phone number we will call with results is # [phone] (home) . If this is not the best number please call our clinic and change the number.  Medication Refills:  If you need any refills please call your pharmacy and they will contact us.   If you need to pick  up your refill at a new pharmacy, please contact the new pharmacy directly. The new pharmacy will help you get your medications transferred faster.   Scheduling:  If you have any concerns about today's visit or wish to schedule another appointment please call our office during normal business hours [phone] (8-5:00 M-F)  If a referral was made to a [facility] and you don't get a call from central scheduling please call [phone].  If a Mammogram was ordered for you at [facility] call [phone] to schedule or change your appointment.  If you had an XRay/CT/Ultrasound/MRI ordered the number is [phone] to schedule or change your radiology appointment.   Medical Concerns:  If you have urgent medical concerns please call [phone] at any time of the day.    Kenroy Bautista, DO            Follow-ups after your visit        Follow-up notes from your care team     Return in about 4 weeks (around 12/27/2018) for gerd.      Who to contact     Please call your clinic at [phone] to:    Ask questions about your health    Make or cancel appointments    Discuss your medicines    Learn about your test results    Speak to your doctor            Additional Information About Your Visit        Care EveryWhere ID     This is your Care EveryWhere ID. This could be used by other organizations to access your [facility] medical records  [id number]        Your Vitals Were     Pulse Temperature Last Period Pulse Oximetry BMI (Body Mass Index)       88 97.9  F (36.6  C) (Oral) 11/04/2013 97% 32.88 kg/m2        Blood Pressure from Last 3 Encounters:   11/29/18 136/81   11/01/18 138/90   09/18/18 130/86    Weight from Last 3 Encounters:   11/29/18 174 lb (78.9 kg)   11/01/18 170 lb 9.6 oz (77.4 kg)   09/18/18 167 lb 9.6 oz (76 kg)              We Performed the Following     HPV High Risk Types DNA Cervical     Pap imaged thin layer screen with HPV - recommended age 30 - 65 years (select HPV  order below)          Today's Medication Changes          These changes are accurate as of 11/29/18  9:13 AM.  If you have any questions, ask your nurse or doctor.               Start taking these medicines.        Dose/Directions    omeprazole 20 MG DR capsule   Commonly known as:  priLOSEC   Used for:  Gastroesophageal reflux disease, esophagitis presence not specified   Started by:  Britney Bautista MD        Dose:  20 mg   Take 1 capsule (20 mg) by mouth daily   Quantity:  90 capsule   Refills:  1            Where to get your medicines      These medications were sent to [facility] [place], [place] - [address]   [address] [facility] [address]     Phone:  [phone]     estradiol 2 MG tablet    omeprazole 20 MG DR capsule                Primary Care Provider Office Phone # Fax #    Britney Medel  [phone]       [facility] [address]  [facility] [address]        Equal Access to Services     CHUY CAMACHO : Hadii adolph velásquez hadasho Sochelly, waaxda luqadaha, qaybta kaalmada adeegyada, brigitte daniels . So [facility] [phone].    ATENCIÓN: Si habla español, tiene a enriquez disposición servicios gratuitos de asistencia lingüística. Llame al [phone].    We comply with applicable federal civil rights laws and [place] laws. We do not discriminate on the basis of race, color, national origin, age, disability, sex, sexual orientation, or gender identity.            Thank you!     Thank you for choosing [facility]  for your care. Our goal is always to provide you with excellent care. Hearing back from our patients is one way we can continue to improve our services. Please take a few minutes to complete the written survey that you may receive in the mail after your visit with us. Thank you!             Your Updated Medication List - Protect others around you: Learn how to safely use, store and throw away your medicines at  www.disposemymeds.org.          This list is accurate as of 11/29/18  9:13 AM.  Always use your most recent med list.                   Brand Name Dispense Instructions for use Diagnosis    ACETAMINOPHEN 8 HOUR 650 MG 8 hour tablet   Generic drug:  acetaminophen     100 tablet    Take 1 tablet by mouth every 8 hours as needed    Left shoulder pain, Shoulder joint pain, left       cetirizine 10 MG tablet    zyrTEC    90 tablet    Take 1 tablet (10 mg) by mouth daily    Environmental and seasonal allergies       diclofenac 1 % topical gel    VOLTAREN    100 g    Apply 4 grams to knees or 2 grams to hands four times daily using enclosed dosing card.    Left lateral epicondylitis       estradiol 2 MG tablet    ESTRACE    30 tablet    Place 1 tablet (2 mg) vaginally daily    Atrophic vaginitis       lisinopril-hydrochlorothiazide 20-12.5 MG per tablet    PRINZIDE/ZESTORETIC    90 tablet    Take 1 tablet by mouth daily    Benign essential hypertension       metroNIDAZOLE 500 MG tablet    FLAGYL    14 tablet    Take 1 tablet (500 mg) by mouth 2 times daily    Bacterial vaginosis       naproxen 500 MG tablet    NAPROSYN    60 tablet    Take 1 tablet (500 mg) by mouth 2 times daily as needed for moderate pain    Lateral epicondylitis of both elbows       omeprazole 20 MG DR capsule    priLOSEC    90 capsule    Take 1 capsule (20 mg) by mouth daily    Gastroesophageal reflux disease, esophagitis presence not specified       ranitidine 150 MG tablet    ZANTAC    60 tablet    Take 1 tablet (150 mg) by mouth 2 times daily    Acute gastritis without hemorrhage, unspecified gastritis type

## 2018-12-03 LAB
COPATH REPORT: NORMAL
PAP: NORMAL

## 2018-12-04 LAB
FINAL DIAGNOSIS: ABNORMAL
HPV HR 12 DNA CVX QL NAA+PROBE: POSITIVE
HPV16 DNA SPEC QL NAA+PROBE: NEGATIVE
HPV18 DNA SPEC QL NAA+PROBE: NEGATIVE
SPECIMEN DESCRIPTION: ABNORMAL
SPECIMEN SOURCE CVX/VAG CYTO: ABNORMAL

## 2018-12-07 ENCOUNTER — TELEPHONE (OUTPATIENT)
Dept: FAMILY MEDICINE | Facility: CLINIC | Age: 58
End: 2018-12-07

## 2018-12-07 DIAGNOSIS — R87.810 CERVICAL HIGH RISK HUMAN PAPILLOMAVIRUS (HPV) DNA TEST POSITIVE: Primary | ICD-10-CM

## 2018-12-07 PROBLEM — B97.7 HIGH RISK HPV INFECTION: Status: ACTIVE | Noted: 2018-11-30

## 2018-12-07 NOTE — TELEPHONE ENCOUNTER
Spoke with patient about her pap results: that the cells are normal, so she does not have cancer or pre-cancer changes to those cells. The two main high risk HPV strains that cause cancer are negative, but explained that one of the other potpourri HPV strains that can cause cancer tested positive, but has not changed her cells yet. Explained that we will repeat her pap in one year to see if her immune system has cleared the virus, or sooner if she develops vaginal bleeding.    Kenroy Bautista, DO

## 2018-12-07 NOTE — TELEPHONE ENCOUNTER
[facility] phone call message- patient requesting to speak directly with PCP or provider.    PCP: Britney Bautista    Reason for Call: Patient received letter regarding lab results. Please call to review    Additional Details: no    Are you willing to speak with a nurse? No    Is a call back needed? Yes    Patient informed that it may take up to 2 business days to hear back from PCP:No    OK to leave a message on voice mail? Yes    Primary language: English      needed? No    Call taken on December 7, 2018 at 11:19 AM by Betahnie Chen route to PCP unless willing to speak with a nurse.

## 2018-12-12 ENCOUNTER — VIRTUAL VISIT (OUTPATIENT)
Dept: FAMILY MEDICINE | Facility: CLINIC | Age: 58
End: 2018-12-12

## 2018-12-12 DIAGNOSIS — Z11.51 SCREENING FOR HUMAN PAPILLOMAVIRUS: ICD-10-CM

## 2018-12-12 DIAGNOSIS — N95.2 ATROPHIC VAGINITIS: Primary | ICD-10-CM

## 2018-12-12 NOTE — PROGRESS NOTES
Chrissy called to have her Pap results explained to her.  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  I explained that her immune system will likely be able to clear this virus and healed on its own without causing any cancer changes to her cervix, but that we will recheck her in 1 year to make sure that it is cleared and there are no changes to her cervical cells.  She also stated that she still has some irritation, even with using the vaginal estrogen tablets.  She would like to switch back to the Premarin cream, which she feels works better for her when she was prescribed it earlier this year.  I will discontinue the tablets and prescribed the Premarin cream.  I informed her to use 1 topical estrogen at a time, and to never combine the tablets and the cream.  Kenroy Bautista, DO

## 2018-12-18 ENCOUNTER — OFFICE VISIT (OUTPATIENT)
Dept: FAMILY MEDICINE | Facility: CLINIC | Age: 58
End: 2018-12-18
Payer: COMMERCIAL

## 2018-12-18 VITALS
BODY MASS INDEX: 31.74 KG/M2 | SYSTOLIC BLOOD PRESSURE: 136 MMHG | DIASTOLIC BLOOD PRESSURE: 86 MMHG | TEMPERATURE: 97.8 F | OXYGEN SATURATION: 98 % | HEART RATE: 98 BPM | WEIGHT: 168 LBS

## 2018-12-18 DIAGNOSIS — N93.9 VAGINAL BLEEDING: Primary | ICD-10-CM

## 2018-12-18 NOTE — PROGRESS NOTES
Preceptor Attestation:   Patient seen, evaluated and discussed with the resident. I have verified the content of the note, which accurately reflects my assessment of the patient and the plan of care.   Supervising Physician:  Akilah Mckenzie MD

## 2018-12-18 NOTE — PATIENT INSTRUCTIONS
Here is the plan from today's visit    1. Vaginal bleeding  - we will set up an appointment with me in one week to do the endometrial biopsy and polyp removal  - if you use more than one pad every hour or two for bleeding, please go to [facility]  - you can continue to use the topical estrogen    Please call or return to clinic if your symptoms don't go away.    Follow up plan  Please make a clinic appointment for follow up with me (MACHELLE MAYER) in 1  week for polyp removal and endometrial biopsy.    Thank you for coming to [facility] today.  Lab Testing:  **If you had lab testing today and your results are reassuring or normal they will be mailed to you or sent through [employer] within 7 days.   **If the lab tests need quick action we will call you with the results.  The phone number we will call with results is # [phone] (home) . If this is not the best number please call our clinic and change the number.  Medication Refills:  If you need any refills please call your pharmacy and they will contact us.   If you need to  your refill at a new pharmacy, please contact the new pharmacy directly. The new pharmacy will help you get your medications transferred faster.   Scheduling:  If you have any concerns about today's visit or wish to schedule another appointment please call our office during normal business hours [phone] (8-5:00 M-F)  If a referral was made to a [facility] and you don't get a call from central scheduling please call [phone].  If a Mammogram was ordered for you at [facility] call [phone] to schedule or change your appointment.  If you had an XRay/CT/Ultrasound/MRI ordered the number is [phone] to schedule or change your radiology appointment.   Medical Concerns:  If you have urgent medical concerns please call [phone] at any time of the day.    Kenroy Mayer,

## 2018-12-18 NOTE — PROGRESS NOTES
HPI       Chrissy Looney is a 58 year old  who presents for   Chief Complaint   Patient presents with     Vaginal Bleeding       Vaginal Bleeding /Dysmenorrhea  Onset: 1 day    Description:   Duration of bleeding episodes: constant  Describe bleeding/flow:   Clots: no  Number of pads/hour:  One panty liner/4 hours  Cramping: moderate    Accompanying Signs & Symptoms:  Weakness: no  Lightheadedness: no  Hot flashes: no  Nosebleeds/Easy bruising: no  Vaginal Discharge: no    History:  Patient's last menstrual period was 11/04/2013.  Previous normal periods: no  Contraceptive use: no, post-menopausal  Possibility of Pregnancy: no  Any bleeding after intercourse: no  Abnormal PAP Smears: Yes Details: NIL, positive HPV (other strains, not 16, 18)  Any previous pelvic imaging? no    - 5 years post menopausal  - no prior vaginal bleeding  - recent pap smear with me about 2 weeks ago - NIL with positive other HPV, plan was to repeat in 1 year  - cervical polyp noted on pap smear. Has had stable cervical polyp for about 30 years per patient report  - has been using topical estrogen for last month or so for atrophic vaginitis  - no recent sex or anything in her vagina  - no vaginal/vulvar pain or injury, no cervical trauma    +++++++    Problem, Medication and Allergy Lists were reviewed and updated if needed..    Patient is an established patient of this clinic..         Review of Systems:   Review of Systems         Physical Exam:     Vitals:    12/18/18 1535   BP: 136/86   Pulse: 98   Temp: 97.8  F (36.6  C)   SpO2: 98%   Weight: 76.2 kg (168 lb)     Body mass index is 31.74 kg/m .  Vitals were reviewed and were normal     Physical Exam   Constitutional: She is oriented to person, place, and time. She appears well-developed and well-nourished.   HENT:   Head: Normocephalic and atraumatic.   Right Ear: External ear normal.   Left Ear: External ear normal.   Nose: Nose normal.   Eyes: Conjunctivae are normal.    Pulmonary/Chest: Effort normal. No respiratory distress.   Abdominal: Soft. There is no tenderness.   Musculoskeletal: She exhibits no deformity.   Neurological: She is alert and oriented to person, place, and time.   Skin: Skin is warm and dry. Capillary refill takes less than 2 seconds. No pallor.   Psychiatric: She has a normal mood and affect.         Results:   Results are ordered and pending    Assessment and Plan      Chrissy is a 58 year old postmenopausal female with known history of a stable cervical polyp and recent abnormal pap smear who presents with 1 day of vaginal bleeding. My main differential at this point is either her cervical polyp is bleeding from mechanical irritation during her recent pap, or endometrial hyperplasia/carcinoma. I will schedule her to return to clinic in one week for a procedural visit for polypectomy and endometrial biopsy. I will also order a transvaginal ultrasound to measure her endometrial stripe in the meantime. Chrissy was told to present to the [facility] if she has significant menstrual bleeding, soaks through a menstrual pad in less than 2 hours, gets lightheaded, dyspneic, dizzy. She can continue to use her topical estrogen cream prn until her next visit.      1. Vaginal bleeding  Orders Placed This Encounter   Procedures     US Pelvic Complete w Transvaginal        There are no discontinued medications.    Options for treatment and follow-up care were reviewed with the patient. Chrissy Looney  engaged in the decision making process and verbalized understanding of the options discussed and agreed with the final plan.    Kenroy Bautista DO

## 2018-12-19 ENCOUNTER — TELEPHONE (OUTPATIENT)
Dept: FAMILY MEDICINE | Facility: CLINIC | Age: 58
End: 2018-12-19

## 2018-12-19 NOTE — TELEPHONE ENCOUNTER
----- Message from Britney Bautista DO sent at 12/18/2018  8:46 PM CST -----  Regarding: ultrasound  Could you please call the patient to schedule a transvaginal ultrasound? I forgot to place the order/talk with the patient about it during the visit.   I've placed a future order and I'd like her to get a transvaginal ultrasound to measure the thickness of the lining of her uterus before she comes to follow up with me on 12/26. This is an extra step to help us determine the source of her bleeding, but it is also ok if she can't get this test scheduled before her follow up with me since the holidays are next week.  Also please tell the patient to take 600-800 mg of ibuprofen before she comes in to her visit with me on 12/26 to help make the procedure less uncomfortable.    Kenroy Bautista DO

## 2018-12-19 NOTE — TELEPHONE ENCOUNTER
RN spoke with patient and relayed this information. She is inquiring about where she would have this done. RN stated that she would look into wether our clinic is able to do this or if she needs to go elsewhere and call her back with that information.  Lorie Boyle RN

## 2018-12-19 NOTE — TELEPHONE ENCOUNTER
RN spoke with US technician and our clinic currently cannot do this particular type of US. RN contacted patient and gave her the radiology scheduling line. She may not be able to get in with the short notice, RN provided reassurance that we understand and it is okay if she cannot.  Lorie Boyle RN

## 2018-12-21 ENCOUNTER — HOSPITAL ENCOUNTER (OUTPATIENT)
Dept: ULTRASOUND IMAGING | Facility: CLINIC | Age: 58
Discharge: HOME | End: 2018-12-21
Attending: FAMILY MEDICINE | Admitting: FAMILY MEDICINE
Payer: COMMERCIAL

## 2018-12-21 DIAGNOSIS — N93.9 VAGINAL BLEEDING: ICD-10-CM

## 2018-12-21 PROCEDURE — 76830 TRANSVAGINAL US NON-OB: CPT

## 2018-12-26 ENCOUNTER — OFFICE VISIT (OUTPATIENT)
Dept: FAMILY MEDICINE | Facility: CLINIC | Age: 58
End: 2018-12-26
Payer: COMMERCIAL

## 2018-12-26 VITALS
OXYGEN SATURATION: 95 % | DIASTOLIC BLOOD PRESSURE: 90 MMHG | TEMPERATURE: 98.4 F | WEIGHT: 170.2 LBS | HEART RATE: 98 BPM | SYSTOLIC BLOOD PRESSURE: 142 MMHG | BODY MASS INDEX: 32.16 KG/M2

## 2018-12-26 DIAGNOSIS — N93.9 VAGINAL BLEEDING: Primary | ICD-10-CM

## 2018-12-26 NOTE — PROGRESS NOTES
[facility]   EMB Procedure Note    History:  Chrissy Looney presents for Endometrial Biopsy for dysfunctional uterine bleeding  Vaginal bleeding: Yes    Labs:   No, post menopausal     Consent:  Affirmation of informed consent is signed and scanned into the medical record. Risks, benefits and alternatives were discussed with pt. Pt's questions were elicited and answered.     Procedure safety checklist was completed:  Yes  Time Out (Pause for the Cause) completed: Yes    Technique:   Patient was pre-medicated with ibuprofen: Yes  Using sterile speculum and equipment, the cervix was cleansed with Betadine prep. The uterus was sounded to 7 cm. The pipelle apparatus was prepared and introduced into the cervix without significant resistance and the sample was collected. Specimen sent to pathology. The cervical polyp was removed with ring forceps without complication and that specimen was sent to pathology as well.  EBL: minimal  Complications: No  Tolerance: Pt tolerated procedure well and was in stable condition.     Follow up: Pt was instructed to call if heavy bleeding, severe cramping or foul smelling discharge. May take 400-600 mg ibuprofen TID prn for mild cramping. Pt should follow up in 2 weeks to discuss results.       Resident: Kenroy Bautista DO  Faculty: Ramona Barragan MD

## 2018-12-26 NOTE — PATIENT INSTRUCTIONS
Here is the plan from today's visit    Endometrial Biopsy and Polpypectomy  - It will take a few days for the lab to process the samples we got today  - I will inform you through [employer] or a phone call about your lab results  - Heavy bleeding (soaking through a pad in less than 1-2 hours) and severe pelvic pain not relieved by ibuprofen are reasons to call us or present to the ER  Please call or return to clinic if your symptoms don't go away.    Follow up plan  - as needed    Thank you for coming to [facility] today.  Lab Testing:  **If you had lab testing today and your results are reassuring or normal they will be mailed to you or sent through [employer] within 7 days.   **If the lab tests need quick action we will call you with the results.  The phone number we will call with results is # [phone] (home) . If this is not the best number please call our clinic and change the number.  Medication Refills:  If you need any refills please call your pharmacy and they will contact us.   If you need to  your refill at a new pharmacy, please contact the new pharmacy directly. The new pharmacy will help you get your medications transferred faster.   Scheduling:  If you have any concerns about today's visit or wish to schedule another appointment please call our office during normal business hours [phone] (8-5:00 M-F)  If a referral was made to a [facility] and you don't get a call from central scheduling please call [phone].  If a Mammogram was ordered for you at [facility] call [phone] to schedule or change your appointment.  If you had an XRay/CT/Ultrasound/MRI ordered the number is [phone] to schedule or change your radiology appointment.   Medical Concerns:  If you have urgent medical concerns please call [phone] at any time of the day.    Britney Bautista,

## 2018-12-27 PROBLEM — M25.522 LEFT ELBOW PAIN: Status: RESOLVED | Noted: 2018-08-09 | Resolved: 2018-12-27

## 2018-12-27 PROBLEM — M77.12 LEFT TENNIS ELBOW: Status: RESOLVED | Noted: 2018-08-09 | Resolved: 2018-12-27

## 2018-12-28 LAB — COPATH REPORT: NORMAL

## 2018-12-28 NOTE — RESULT ENCOUNTER NOTE
Called patient and discussed results. At 1:06 PM.  I informed Chrissy that her endometrial biopsy and her polyp were all normal, with no signs of cancer or precancer changes to the cells.  She acknowledged understanding, stating that her good eye informed her that we will just plan to repeat a Pap test in 1 year because she had tested positive for other types of HPV on her most recent Pap.  She voiced understanding and accepted this plan.  Kenroy Bautista, DO

## 2019-03-25 ENCOUNTER — OFFICE VISIT (OUTPATIENT)
Dept: FAMILY MEDICINE | Facility: CLINIC | Age: 59
End: 2019-03-25
Payer: COMMERCIAL

## 2019-03-25 VITALS
DIASTOLIC BLOOD PRESSURE: 87 MMHG | OXYGEN SATURATION: 97 % | BODY MASS INDEX: 32.35 KG/M2 | WEIGHT: 171.2 LBS | TEMPERATURE: 98.3 F | HEART RATE: 95 BPM | SYSTOLIC BLOOD PRESSURE: 129 MMHG

## 2019-03-25 DIAGNOSIS — J06.9 VIRAL URI WITH COUGH: Primary | ICD-10-CM

## 2019-03-25 RX ORDER — BENZONATATE 100 MG/1
100 CAPSULE ORAL 3 TIMES DAILY PRN
Qty: 30 CAPSULE | Refills: 0 | Status: SHIPPED | OUTPATIENT
Start: 2019-03-25 | End: 2019-08-13

## 2019-03-25 RX ORDER — GUAIFENESIN 600 MG/1
1200 TABLET, EXTENDED RELEASE ORAL 2 TIMES DAILY
Qty: 30 TABLET | Refills: 0 | Status: SHIPPED | OUTPATIENT
Start: 2019-03-25 | End: 2020-04-01

## 2019-03-25 NOTE — PROGRESS NOTES
HPI       Chrissy Looney is a 59 year old  who presents for   Chief Complaint   Patient presents with     Cough     3 days, dry cough     Acute Illness   Concerns: cough for 3 days  When did it start? 3 days ago  Is it getting better, worse or staying the same? unchanged    Fatigue/Achiness?: YES     Fever?: No     Chills/Sweats?:  YES chills    Headache (location?)No     Sinus Pressure?:No     Eye redness/Discharge?: No     Ear Pain?: No     Runny nose?: No     Congestion?: No     Sore Throat?: No   Respiratory    Cough?:  YES-non-productive    Wheeze?: No   GI/    Decreased Appetite?: YES     Nausea?: YES    Vomiting?: No     Diarrhea?:  No     Dysuria/Frequency?.:No       Any Illness Exposure?:  YES 2 grandsons, she thinks they could have the flu    Any foreign travel or contact with anyone ill who travelled abroad? No     Therapies Tried and outcome: Tylenol, Nighttime Robitussin DM      +++++++    Problem, Medication and Allergy Lists were reviewed and updated if needed..    Patient is an established patient of this clinic..         Review of Systems:   Review of Systems  As per [facility]       Physical Exam:     Vitals:    03/25/19 1124   BP: 129/87   Pulse: 95   Temp: 98.3  F (36.8  C)   TempSrc: Oral   SpO2: 97%   Weight: 77.7 kg (171 lb 3.2 oz)     Body mass index is 32.35 kg/m .  Vitals were reviewed and were normal     Physical Exam   Constitutional: She is oriented to person, place, and time. She appears well-developed and well-nourished.   HENT:   Head: Normocephalic and atraumatic.   Right Ear: Tympanic membrane normal.   Left Ear: Tympanic membrane normal.   Nose: Mucosal edema and rhinorrhea present. Right sinus exhibits no maxillary sinus tenderness and no frontal sinus tenderness. Left sinus exhibits no maxillary sinus tenderness and no frontal sinus tenderness.   Mouth/Throat: Uvula is midline, oropharynx is clear and moist and mucous membranes are normal. No tonsillar exudate.   Eyes:  Conjunctivae are normal.   Neck: Neck supple.   Cardiovascular: Normal rate, regular rhythm and normal heart sounds.   Pulmonary/Chest: Effort normal and breath sounds normal. No respiratory distress. She has no wheezes. She has no rales.   Musculoskeletal: Normal range of motion.   Lymphadenopathy:     She has no cervical adenopathy.   Neurological: She is alert and oriented to person, place, and time.   Skin: Skin is warm and dry.   Psychiatric: She has a normal mood and affect. Her behavior is normal. Thought content normal.          Results:   No testing ordered today    Assessment and Plan        Chrissy was seen today for cough.    Diagnoses and all orders for this visit:    Viral URI with cough  Pt presenting with 3 days of symptoms, unclear if viral URI vs. Influenza (although has not had a fever and did have flu vaccine so viral URI more likely). Is out of treatment window for tamiflu. Will treat symptomatically. Return if not improved in 1 week or if she develops fever or difficulty breathing.   -     benzonatate (TESSALON) 100 MG capsule; Take 1 capsule (100 mg) by mouth 3 times daily as needed for cough  -     guaiFENesin (MUCINEX) 600 MG 12 hr tablet; Take 2 tablets (1,200 mg) by mouth 2 times daily       There are no discontinued medications.    Options for treatment and follow-up care were reviewed with the patient. Chrissy Looney  engaged in the decision making process and verbalized understanding of the options discussed and agreed with the final plan.    Britney Medel DO

## 2019-03-25 NOTE — PATIENT INSTRUCTIONS
Take Tessalon perles every 8 hours as needed for cough.   Mucinex  Every 12 hours as needed for congestion.   Return if you are not improved in 1 week.     Britney Medel, DO

## 2019-04-23 ENCOUNTER — OFFICE VISIT (OUTPATIENT)
Dept: FAMILY MEDICINE | Facility: CLINIC | Age: 59
End: 2019-04-23
Payer: COMMERCIAL

## 2019-04-23 VITALS
SYSTOLIC BLOOD PRESSURE: 151 MMHG | HEIGHT: 62 IN | BODY MASS INDEX: 31.25 KG/M2 | RESPIRATION RATE: 16 BRPM | DIASTOLIC BLOOD PRESSURE: 93 MMHG | OXYGEN SATURATION: 99 % | HEART RATE: 102 BPM | TEMPERATURE: 98.2 F | WEIGHT: 169.8 LBS

## 2019-04-23 DIAGNOSIS — S61.411A LACERATION OF RIGHT HAND WITHOUT FOREIGN BODY, INITIAL ENCOUNTER: Primary | ICD-10-CM

## 2019-04-23 DIAGNOSIS — Z23 NEED FOR TDAP VACCINATION: ICD-10-CM

## 2019-04-23 ASSESSMENT — MIFFLIN-ST. JEOR: SCORE: 1303.46

## 2019-04-23 NOTE — PROGRESS NOTES
"       HPI       Chrissy Looney is a 59 year old  who presents for   Chief Complaint   Patient presents with     Laceration     cuts on right hand from window breaking         Concern: Hand lacerations   Description of the problem :Around 10:00am today was fixing windows in home when both windows slipped (top and bottom) and then each landed on R hand. Denies any popping. Denies numbness/tingling. Has full range of movement. Applied hydrogen peroxide immediately afterwards - bleeding worsened in one of the cuts after this. Recalls having TDaP 2010 but isn't sure if she's had up to 3 immunizations.       +++++++      Problem, Medication and Allergy Lists were reviewed and updated if needed..    Patient is an established patient of this clinic..         Review of Systems:   Review of Systems         Physical Exam:     Vitals:    04/23/19 1121 04/23/19 1124   BP: (!) 168/98 (!) 151/93   BP Location: Left arm    Patient Position: Sitting    Cuff Size: Adult Regular    Pulse: 102    Resp: 16    Temp: 98.2  F (36.8  C)    TempSrc: Oral    SpO2: 99%    Weight: 77 kg (169 lb 12.8 oz)    Height: 1.575 m (5' 2\")      Body mass index is 31.06 kg/m .  Vital signs normal except Blood pressure     Physical Exam   Musculoskeletal:        Right hand: She exhibits decreased range of motion, tenderness, laceration and swelling. Normal sensation noted. Normal strength noted.        Hands:  AROM decreased secondary to pain   Skin: Bruising and laceration noted.   Has 6 lacerations all except for one are fairly shallow and already have good hemostasis.         Results:   No testing ordered today    Assessment and Plan        Chrissy was seen today for laceration.    Diagnoses and all orders for this visit:    Laceration of right hand without foreign body, initial encounter  -     ADMIN VACCINE, INITIAL    Need for Tdap vaccination  -     ADMIN VACCINE, INITIAL  -     TDAP VACCINE (BOOSTRIX)      One laceration on lateral middle finger that " appears a little deeper with some oozing following flushing with tap water but had some hemostasis previous to that. Applied 3 steri-strips to this laceration and bandages to 3 other lacerations (2 on middle finger and one dorsum of hand). Provided signs/symptoms of when to return to clinic (pain, fever, redness, discharge, etc).    Given TDaP because she could not confirm receipt of at least 3 tetanus containing immunizations. [facility] has 2 recorded.       There are no discontinued medications.    Options for treatment and follow-up care were reviewed with the patient. Chrissy Looney  engaged in the decision making process and verbalized understanding of the options discussed and agreed with the final plan.    Angelito Rincon MD

## 2019-04-23 NOTE — PATIENT INSTRUCTIONS
Use Naproxen 1 pill twice a day for the next 2 days. Use acetaminophen 650tabs every 6 hours as needed for pain for the next 2 days.  Be sure to use ice on your hand at least twice a day for 5-10 minutes to help with swelling. Do this for at least 2 days. You can continue to do that if it helps with discomfort.    Patient Education     Extremity Laceration: Stitches, Staples, or Tape  A laceration is a cut through the skin. If it is deep, it may require stitches or staples to close so it can heal. Minor cuts may be treated with surgical tape closures, or skin glue.  X-rays may be done if something may have entered the skin through the cut. You may also need a tetanus shot if you are not up to date on this vaccine.  Home care    Follow the healthcare provider s instructions on how to care for the cut.    Wash your hands with soap and warm water before and after caring for your wound. This is to help prevent infection.    Keep the wound clean and dry. If a bandage was applied and it becomes wet or dirty, replace it. Otherwise, leave it in place for the first 24 hours, then change it once a day or as directed.    If stitches or staples were used, clean the wound daily:  ? After removing the bandage, wash the area with soap and water. Use a wet cotton swab to loosen and remove any blood or crust that forms.  ? After cleaning, keep the wound clean and dry. Talk with your healthcare provider before putting any antibiotic ointment on the wound. Reapply the bandage.    You may remove the bandage to shower as usual after the first 24 hours, but don't soak the area in water (no swimming) until the stitches or staples are removed.    If surgical tape closures were used, keep the area clean and dry. If it becomes wet, blot it dry with a towel. Let the surgical tape fall off on its own.    The healthcare provider may prescribe an antibiotic cream or ointment to prevent infection. He or she may also prescribe an antibiotic pill.  Don't stop taking this medicine until you have finished it all or the provider tells you to stop.    The provider may also prescribe medicine for pain. Follow the instructions for taking these medicines.    Don't do activities that may reopen your wound.  Follow-up care  Follow up with your healthcare provider, or as advised. Most skin wounds heal within 10 days. But an infection may sometimes occur even with proper treatment. Check the wound daily for the signs of infection listed below. Stitches and staples should be removed within 7 to14 days. If surgical tape closures were used, you may remove them after 10 days if they have not fallen off by then.   When to seek medical advice  Call your healthcare provider right away if any of these occur:    Wound bleeding not controlled by direct pressure    Signs of infection, including increasing pain in the wound, increasing wound redness or swelling, or pus or bad odor coming from the wound    Fever of 100.4 F (38 C) or higher, or as directed by your healthcare provider    Stitches or staples come apart or fall out or surgical tape falls off before 7 days    Wound edges reopen    Wound changes colors    Numbness occurs around the wound     Decreased movement around the injured area  Date Last Reviewed: 7/1/2017 2000-2018 [employer]. [address] [place] [address], [place] [address]. All rights reserved. This information is not intended as a substitute for professional medical care. Always follow your healthcare professional's instructions.

## 2019-05-23 DIAGNOSIS — I10 BENIGN ESSENTIAL HYPERTENSION: ICD-10-CM

## 2019-05-23 DIAGNOSIS — J30.89 ENVIRONMENTAL AND SEASONAL ALLERGIES: ICD-10-CM

## 2019-05-23 RX ORDER — LISINOPRIL AND HYDROCHLOROTHIAZIDE 12.5; 2 MG/1; MG/1
1 TABLET ORAL DAILY
Qty: 90 TABLET | Refills: 3 | Status: SHIPPED | OUTPATIENT
Start: 2019-05-23 | End: 2020-05-18

## 2019-05-23 RX ORDER — CETIRIZINE HYDROCHLORIDE 10 MG/1
10 TABLET ORAL DAILY
Qty: 90 TABLET | Refills: 3 | Status: SHIPPED | OUTPATIENT
Start: 2019-05-23 | End: 2020-05-12

## 2019-08-05 DIAGNOSIS — M77.12 LEFT LATERAL EPICONDYLITIS: ICD-10-CM

## 2019-08-06 ENCOUNTER — TELEPHONE (OUTPATIENT)
Dept: FAMILY MEDICINE | Facility: CLINIC | Age: 59
End: 2019-08-06

## 2019-08-06 DIAGNOSIS — M77.11 LATERAL EPICONDYLITIS OF BOTH ELBOWS: ICD-10-CM

## 2019-08-06 DIAGNOSIS — M77.12 LATERAL EPICONDYLITIS OF BOTH ELBOWS: ICD-10-CM

## 2019-08-06 NOTE — TELEPHONE ENCOUNTER
PRIOR AUTHORIZATION FOR   Drug: Diclofenac 1 % gel  Insurance: [facility]  ID Number: [id number]  BIN Number: [id number]  PCN Number: [id number]  Group Number: [id number]  Phone Number: [phone]    Please notify pharmacy if Prior Auth is approved or denied

## 2019-08-12 NOTE — TELEPHONE ENCOUNTER
Central Prior Authorization Team   Phone: [phone]      PA Initiation    Medication: diclofenac (VOLTAREN) 1 % topical gel-PA Pending  Insurance Company: [employer] - Phone [phone] Fax [phone]  Pharmacy Filling the Rx: [place], [place] - [address]  Filling Pharmacy Phone: [phone]  Filling Pharmacy Fax:    Start Date: 8/12/2019

## 2019-08-13 RX ORDER — ACETAMINOPHEN 500 MG
500-1000 TABLET ORAL EVERY 6 HOURS PRN
Qty: 1 BOTTLE | Refills: 11 | Status: SHIPPED | OUTPATIENT
Start: 2019-08-13

## 2019-08-13 RX ORDER — NAPROXEN 500 MG/1
TABLET ORAL
Qty: 60 TABLET | Refills: 3 | Status: SHIPPED | OUTPATIENT
Start: 2019-08-13 | End: 2020-04-01

## 2019-08-13 NOTE — TELEPHONE ENCOUNTER
PRIOR AUTHORIZATION DENIED    Medication: diclofenac (VOLTAREN) 1 % topical gel-DENIED    Denial Date: 8/13/2019    Denial Rational:         Appeal Information:

## 2019-08-13 NOTE — TELEPHONE ENCOUNTER
Prior Authorization:     Denied Reason: see chart    Alternatives: None available    Pharmacy notified.  Routing to MD    Please advise if you would like to move forward with the appeal process or plan to  prescribe an alternative medication. If Appeal is desired a letter of medical necessity with denial rationale is needed to start the appeal process.   Route to PA Pool when completed.    Velia Garay CMA on 8/13/2019 at 11:37 AM

## 2019-08-14 NOTE — TELEPHONE ENCOUNTER
Will re-prescribe tylenol and naproxen as alternatives to voltaren gel - sent to [facility] pharmacy. Please call to inform patient.    Kenroy Bautista, DO

## 2019-08-16 ENCOUNTER — OFFICE VISIT (OUTPATIENT)
Dept: FAMILY MEDICINE | Facility: CLINIC | Age: 59
End: 2019-08-16
Payer: COMMERCIAL

## 2019-08-16 ENCOUNTER — TELEPHONE (OUTPATIENT)
Dept: FAMILY MEDICINE | Facility: CLINIC | Age: 59
End: 2019-08-16

## 2019-08-16 VITALS
TEMPERATURE: 98.1 F | OXYGEN SATURATION: 97 % | BODY MASS INDEX: 29.27 KG/M2 | HEIGHT: 63 IN | RESPIRATION RATE: 16 BRPM | WEIGHT: 165.2 LBS | DIASTOLIC BLOOD PRESSURE: 89 MMHG | HEART RATE: 89 BPM | SYSTOLIC BLOOD PRESSURE: 142 MMHG

## 2019-08-16 DIAGNOSIS — M99.9 NONALLOPATHIC LESION OF SACRAL REGION: ICD-10-CM

## 2019-08-16 DIAGNOSIS — M54.50 CHRONIC BILATERAL LOW BACK PAIN WITHOUT SCIATICA: Primary | ICD-10-CM

## 2019-08-16 DIAGNOSIS — M99.05 SOMATIC DYSFUNCTION OF PELVIC REGION: ICD-10-CM

## 2019-08-16 DIAGNOSIS — G89.29 CHRONIC BILATERAL LOW BACK PAIN WITHOUT SCIATICA: Primary | ICD-10-CM

## 2019-08-16 ASSESSMENT — PAIN SCALES - GENERAL: PAINLEVEL: SEVERE PAIN (7)

## 2019-08-16 ASSESSMENT — MIFFLIN-ST. JEOR: SCORE: 1298.34

## 2019-08-16 NOTE — TELEPHONE ENCOUNTER
"----- Message from Rosita Clancy sent at 8/16/2019  1:55 PM CDT -----  Regarding: Patient Concern  Contact: [phone]  This patient had an OV this morning with Dr Bautista and was roomed by Gloria. She called back after lunch as she was bothered by two questions she was asked by Gloria (or the wording). She said she was asked if \"she was sexually active, and if yes, with a man or a woman.\"  The patient stated that she had been coming here for a long time and she has never been asked these questions.  She is wondering if the [facility] had a right to ask her these questions. She would like a call back.     Rosita"

## 2019-08-16 NOTE — PATIENT INSTRUCTIONS
Here is the plan from today's visit    1. Chronic bilateral low back pain without sciatica  - keep using ibuprofen or naproxen for bad days  - keep doing PT at home  - keep doing CBD and icyhot  - come back for OMT with me in 2 weeks      Please call or return to clinic if your symptoms don't go away.    Follow up plan      Thank you for coming to [facility] today.  Lab Testing:  **If you had lab testing today and your results are reassuring or normal they will be mailed to you or sent through [employer] within 7 days.   **If the lab tests need quick action we will call you with the results.  The phone number we will call with results is # [phone] (home) . If this is not the best number please call our clinic and change the number.  Medication Refills:  If you need any refills please call your pharmacy and they will contact us.   If you need to  your refill at a new pharmacy, please contact the new pharmacy directly. The new pharmacy will help you get your medications transferred faster.   Scheduling:  If you have any concerns about today's visit or wish to schedule another appointment please call our office during normal business hours [phone] (8-5:00 M-F)  If a referral was made to a [facility] and you don't get a call from central scheduling please call [phone].  If a Mammogram was ordered for you at [facility] call [phone] to schedule or change your appointment.  If you had an XRay/CT/Ultrasound/MRI ordered the number is [phone] to schedule or change your radiology appointment.   Medical Concerns:  If you have urgent medical concerns please call [phone] at any time of the day.    Kenroy Bautista, DO

## 2019-08-16 NOTE — TELEPHONE ENCOUNTER
"----- Message from Rosita Clancy sent at 8/16/2019  1:55 PM CDT -----  Regarding: Patient Concern  Contact: [phone]  This patient had an OV this morning with Dr Bautista and was roomed by Gloria. She called back after lunch as she was bothered by two questions she was asked by Gloria (or the wording). She said she was asked if \"she was sexually active, and if yes, with a man or a woman.\"  The patient stated that she had been coming here for a long time and she has never been asked these questions.  She is wondering if the [facility] had a right to ask her these questions. She would like a call back.     Rosita"

## 2019-08-16 NOTE — PROGRESS NOTES
Preceptor Attestation:   Patient seen, evaluated and discussed with the resident. I have verified the content of the note, which accurately reflects my assessment of the patient and the plan of care.   Supervising Physician:  Deana Hutton MD

## 2019-08-16 NOTE — PROGRESS NOTES
"       HPI       Chrissy Looney is a 59 year old  who presents for   Chief Complaint   Patient presents with     Back Pain     patient is here for lower back pain and she have arthritis on here back for a long time      1. Back pain  - aches all day, midline, low back (lumbosacral), nonradiating  - knows she has mild DDD (XR 5 years ago)  - worse with damp weather  - also has lipoma in that area, doesn't know if that worsens it (diagnosed with ultrasound)  - using icyhot and icyhot smart relief (helps more)  - CBD cream helping a little (used some under oil tongue from daughter once)  - voltaren gel and naproxen no longer being covered by insurance, but using ibuprofen for bad days with decent relief  - did a course of PT back a few years ago, still doing home exercises  - denies saddle anesthesia, leg weakness, bowel/bladder incontinence    Problem, Medication and Allergy Lists were reviewed and updated if needed..    Patient is an established patient of this clinic..         Review of Systems:   Review of Systems         Physical Exam:     Vitals:    08/16/19 0802   BP: (!) 142/89   Pulse: 89   Resp: 16   Temp: 98.1  F (36.7  C)   TempSrc: Oral   SpO2: 97%   Weight: 74.9 kg (165 lb 3.2 oz)   Height: 1.6 m (5' 2.99\")     Body mass index is 29.27 kg/m .     Recheck /85 pulse 87    Vitals were reviewed and were normal (on repeat BP)     Physical Exam   Constitutional: She is oriented to person, place, and time. She appears well-developed and well-nourished. No distress.   HENT:   Head: Normocephalic and atraumatic.   Pulmonary/Chest: Effort normal.   Neurological: She is alert and oriented to person, place, and time.   Skin: Skin is warm and dry.   Psychiatric: She has a normal mood and affect.   MSK:  Tender:  right SI joint  Non-tender:  thoracic spinous processes, left parathoracic muscles, right parathoracic muscles, lumbar spinous processes, left para lumbar muscles, right para lumbar muscles, left SI joint, " left sciatic notch, right sciatic notch  Range of Motion:  lumbar flexion  full, lumbar extension  full, left lateral lumbar bending  full, right lateral lumbar bending  full  Strength:  able to heel walk, able to toe walk  Special tests:  negative straight leg raises    Hip Exam: Hip ROM full, left greater trocanter non-tender, right greater trocanter non-tender      Results:   No testing ordered today    Assessment and Plan      Chrissy Looney is a 58 yo cisgender woman who presents for evaluation of chronic midline lumbar pain.     1. Chronic bilateral low back pain without sciatica  2. Nonallopathic lesion of sacral region  3. Somatic dysfunction of pelvic region  Near optimal medical management. Encouraged patient to keep up with home exercises -handout provided for refresher.  - OSTEOPATHIC MANIP,1-2 BODY REGN    Please see OMT Procedure Note below for the specifics of treatment.     There are no discontinued medications.    Options for treatment and follow-up care were reviewed with the patient. Chrsisy Looney  engaged in the decision making process and verbalized understanding of the options discussed and agreed with the final plan.    DO [facility] Resident PGY-2  [phone]               OMT PROCEDURE NOTE    Body Region: L-spine, Sacrum, Pelvis, and Innominates  Somatic Dysfunction: R anterior pelvic rotation, decreased sacral respiratory motion  Treatment: Direct Muscle Energy and Facilitated Positional Release Techniques.   Outcome: Improved    The patient actively participated in OMT and was able to communicate both positive and negative feedback throughout. OMT completed without incident. Patient tolerated treatment well. Patient reported that ROM, function, and/or pain level were improved.  Advised that pain is occasionally worse during the first 24 hours after treatment and that drinking more water and taking Tylenol or Ibuprofen often help. Patient to return in 2-4 week/s  or as needed for repeat OMT.     DO [facility] Resident PGY-2  [phone]

## 2019-08-19 NOTE — TELEPHONE ENCOUNTER
Called patient at 12noon on 8-19. She states that the pharmacy called her on Friday (8-16) and she came to  the prescriptions then.    Na Dubon CMA on 8/19/2019 at 12:01 PM

## 2019-08-29 ENCOUNTER — OFFICE VISIT (OUTPATIENT)
Dept: FAMILY MEDICINE | Facility: CLINIC | Age: 59
End: 2019-08-29
Payer: COMMERCIAL

## 2019-08-29 VITALS
TEMPERATURE: 98 F | SYSTOLIC BLOOD PRESSURE: 137 MMHG | WEIGHT: 164.2 LBS | HEART RATE: 95 BPM | DIASTOLIC BLOOD PRESSURE: 88 MMHG | OXYGEN SATURATION: 95 % | BODY MASS INDEX: 30.22 KG/M2 | RESPIRATION RATE: 16 BRPM | HEIGHT: 62 IN

## 2019-08-29 DIAGNOSIS — M54.50 CHRONIC BILATERAL LOW BACK PAIN WITHOUT SCIATICA: Primary | ICD-10-CM

## 2019-08-29 DIAGNOSIS — M99.9 NONALLOPATHIC LESION OF SACRAL REGION: ICD-10-CM

## 2019-08-29 DIAGNOSIS — M99.9 NONALLOPATHIC LESION OF LUMBAR REGION: ICD-10-CM

## 2019-08-29 DIAGNOSIS — Z00.00 HEALTHCARE MAINTENANCE: ICD-10-CM

## 2019-08-29 DIAGNOSIS — M99.9 NONALLOPATHIC LESION OF CERVICAL REGION: ICD-10-CM

## 2019-08-29 DIAGNOSIS — G89.29 CHRONIC BILATERAL LOW BACK PAIN WITHOUT SCIATICA: Primary | ICD-10-CM

## 2019-08-29 DIAGNOSIS — M99.9 NONALLOPATHIC LESION OF RIB CAGE: ICD-10-CM

## 2019-08-29 DIAGNOSIS — M99.9 NONALLOPATHIC LESION OF THORACIC REGION: ICD-10-CM

## 2019-08-29 DIAGNOSIS — M99.05 SOMATIC DYSFUNCTION OF PELVIC REGION: ICD-10-CM

## 2019-08-29 ASSESSMENT — PAIN SCALES - GENERAL: PAINLEVEL: SEVERE PAIN (7)

## 2019-08-29 ASSESSMENT — MIFFLIN-ST. JEOR: SCORE: 1281.31

## 2019-08-29 NOTE — PROGRESS NOTES
Preceptor Attestation:   Patient seen, evaluated and discussed with the resident. I have verified the content of the note, which accurately reflects my assessment of the patient and the plan of care.   Supervising Physician:  Augusta Cardozo MD MD

## 2019-08-29 NOTE — PROGRESS NOTES
MADELEINE Lowe is a 59 year old female who presents today for   Chief Complaint   Patient presents with     RECHECK     follow up OMT no other concern        Hasn't noticed much help with our last OMT session. She continues doing home PT stretches that help it be manageable, but it is still bothersome. Still goes on long walks and cares for her grandkid and does her daily activities. CBD oil has been the most helpful - she's interested in medical marijuana.    Life stressors/updates:  forgot 37th anniversary, niece just diagnosed with breast cancer without good response to rad and surgery so will start chemo      Patient Active Problem List   Diagnosis     Essential hypertension     Glucose intolerance (impaired glucose tolerance)     Prediabetes     Low back pain     Chronic maxillary sinusitis     High risk HPV infection       Current Outpatient Medications   Medication Sig Dispense Refill     acetaminophen (TYLENOL) 500 MG tablet Take 1-2 tablets (500-1,000 mg) by mouth every 6 hours as needed for mild pain 1 Bottle 11     cetirizine (ZYRTEC) 10 MG tablet Take 1 tablet (10 mg) by mouth daily 90 tablet 3     conjugated estrogens (PREMARIN) 0.625 MG/GM vaginal cream Place 0.5 g vaginally twice a week 30 g 3     diclofenac (VOLTAREN) 1 % topical gel Apply 4 grams to knees or 2 grams to hands four times daily using enclosed dosing card. 100 g 1     guaiFENesin (MUCINEX) 600 MG 12 hr tablet Take 2 tablets (1,200 mg) by mouth 2 times daily 30 tablet 0     lisinopril-hydrochlorothiazide (PRINZIDE/ZESTORETIC) 20-12.5 MG tablet Take 1 tablet by mouth daily 90 tablet 3     naproxen (NAPROSYN) 500 MG tablet Use 500 mg two times daily with meals as needed. 60 tablet 3     omeprazole (PRILOSEC) 20 MG DR capsule Take 1 capsule (20 mg) by mouth daily 90 capsule 1     ranitidine (ZANTAC) 150 MG tablet Take 1 tablet (150 mg) by mouth 2 times daily 60 tablet 1       Active medical problems and medication list reviewed  "    No Known Allergies    Social History     Socioeconomic History     Marital status:      Spouse name: Not on file     Number of children: Not on file     Years of education: Not on file     Highest education level: Not on file   Occupational History     Not on file   Social Needs     Financial resource strain: Not on file     Food insecurity:     Worry: Not on file     Inability: Not on file     Transportation needs:     Medical: Not on file     Non-medical: Not on file   Tobacco Use     Smoking status: Former Smoker     Last attempt to quit: 4/17/2004     Years since quitting: 15.3     Smokeless tobacco: Never Used     Tobacco comment: quit 2004   Substance and Sexual Activity     Alcohol use: Yes     Comment: once  a month     Drug use: Not on file     Sexual activity: Yes     Partners: Male   Lifestyle     Physical activity:     Days per week: Not on file     Minutes per session: Not on file     Stress: Not on file   Relationships     Social connections:     Talks on phone: Not on file     Gets together: Not on file     Attends Christian service: Not on file     Active member of club or organization: Not on file     Attends meetings of clubs or organizations: Not on file     Relationship status: Not on file     Intimate partner violence:     Fear of current or ex partner: Not on file     Emotionally abused: Not on file     Physically abused: Not on file     Forced sexual activity: Not on file   Other Topics Concern     Parent/sibling w/ CABG, MI or angioplasty before 65F 55M? Not Asked   Social History Narrative     Not on file            Review of Systems:     ROS              Physical Exam:     Vitals:    08/29/19 0758   BP: 137/88   Pulse: 95   Resp: 16   Temp: 98  F (36.7  C)   TempSrc: Oral   SpO2: 95%   Weight: 74.5 kg (164 lb 3.2 oz)   Height: 1.58 m (5' 2.21\")       Physical Exam  PE per OMT note    OMT Procedure Note:      Body Region: Cervical    Somatic Dysfunction #1: C motion " restriction  Treatment: balancing ligaments/torque unwinding   Outcome: Improved      Body Region: Thoracic    Somatic Dysfunction #1: scapular asymmetric motion  Treatment: balancing ligaments/torque unwinding   Outcome: Improved        Body Region: Lumbar    Somatic Dysfunction #1: thoracolumbar restriction  Treatment: HVLA and balancing ligaments/torque unwinding   Outcome: Stable    Body Region: Sacrum    Somatic Dysfunction #1: L piriformis tenderpoint  Treatment: counterstrain   Outcome: Improved    Somatic Dysfunction #2: asymmetric sacral motion  Treatment: balancing ligaments/torque unwinding   Outcome: Stable        Body Region: Pelvis    Somatic Dysfunction #1: R anterior inominate rotation  Treatment: muscle energy: direct   Outcome: Resolved    Somatic Dysfunction #2: asymmetric pelvic motion  Treatment: balancing ligaments/torque unwinding   Outcome: Improved      Body Region: Ribs    Somatic Dysfunction #1: thoracic inlet restriction  Treatment: balancing ligaments/torque unwinding   Outcome: Improved        Assessment and Plan     Chrissy is a 59 year old cisgender woman who presents today for:    1. Chronic bilateral low back pain without sciatica  3. Nonallopathic lesion of cervical region  4. Nonallopathic lesion of thoracic region  5. Nonallopathic lesion of lumbar region  6. Nonallopathic lesion of sacral region  7. Somatic dysfunction of pelvic region  8. Nonallopathic lesion of rib cage    Did not respond to initial OMT treatment, hopefully today's will be more helpful. States piriformis trigger point injection was helpful in the past - will come back in 1-2 weeks to get that done. If no improvement after 3rd session OMT, will discontinue. Initially thought to refer for medical marijuana certification. Patient does not qualify for medical marijuana based on diagnosis and she is not interested in paying for out of pocket costs, so she will continue using self-purchased CBD oil in addition to  home PT exercises.  - OSTEOPATHIC MANIP,5-6 BODY REGN      2. Healthcare maintenance  - Calcium Citrate-Vitamin D (CALCIUM CITRATE PETITE/VIT D) 200-250 MG-UNIT TABS; Take 1 tablet by mouth daily  Dispense: 60 tablet; Refill: 11        OMT completed without incident. Patient tolerated treatment  well. Advised that pain is occasionally worse during the first 24 hours after treatment. Patient to return in 2 weeks or as needed for repeat OMT.       [facility] Resident PGY-2  [phone]

## 2019-08-29 NOTE — PATIENT INSTRUCTIONS
1. Come back in 1-2 weeks for a trigger point injection  2. Make an appointment with Dr Anderson or Dr Correa for medical marijuana certification

## 2019-09-06 ENCOUNTER — OFFICE VISIT (OUTPATIENT)
Dept: FAMILY MEDICINE | Facility: CLINIC | Age: 59
End: 2019-09-06
Payer: COMMERCIAL

## 2019-09-06 VITALS
DIASTOLIC BLOOD PRESSURE: 85 MMHG | HEIGHT: 62 IN | TEMPERATURE: 98.1 F | RESPIRATION RATE: 16 BRPM | BODY MASS INDEX: 30.18 KG/M2 | SYSTOLIC BLOOD PRESSURE: 132 MMHG | OXYGEN SATURATION: 95 % | HEART RATE: 88 BPM | WEIGHT: 164 LBS

## 2019-09-06 DIAGNOSIS — G89.29 CHRONIC BILATERAL LOW BACK PAIN WITHOUT SCIATICA: Primary | ICD-10-CM

## 2019-09-06 DIAGNOSIS — M54.50 CHRONIC BILATERAL LOW BACK PAIN WITHOUT SCIATICA: Primary | ICD-10-CM

## 2019-09-06 ASSESSMENT — MIFFLIN-ST. JEOR: SCORE: 1283.53

## 2019-09-06 NOTE — PROGRESS NOTES
Preceptor Attestation:   Patient seen, evaluated and discussed with the resident. I have verified the content of the note, which accurately reflects my assessment of the patient and the plan of care.   Supervising Physician:  Cristi Blandon MD MD

## 2019-09-06 NOTE — PROGRESS NOTES
"       HPI       Chrissy Looney is a 59 year old  who presents for   Chief Complaint   Patient presents with     Imm/Inj     Trigger point injection     OMT     Low back      1. Chronic low back pain  Didn't find OMT very helpful even after 2nd treatment. Would not like to proceed with OMT any more.  Still doing home PT exercises and stretches.  Still going on walks for exercise.  Still using CBD oil which works well.  No changes to back pain.  Did have a flare in pain after [employer] concert at [facility].  Would like trigger point injection near her R SI joint where she got it last time with Olga Villar.      Problem, Medication and Allergy Lists were reviewed and updated if needed..    Patient is an established patient of this clinic..         Review of Systems:   Review of Systems         Physical Exam:     Vitals:    09/06/19 0845 09/06/19 0848   BP: (!) 133/90 132/85   BP Location: Right arm Left arm   Patient Position: Sitting Sitting   Cuff Size: Adult Regular Adult Regular   Pulse: 88    Resp: 16    Temp: 98.1  F (36.7  C)    TempSrc: Oral    SpO2: 95%    Weight: 74.4 kg (164 lb)    Height: 1.585 m (5' 2.4\")      Body mass index is 29.61 kg/m .  Vitals were reviewed and were normal     Physical Exam   Constitutional: She is oriented to person, place, and time. She appears well-developed and well-nourished. No distress.   HENT:   Head: Atraumatic.   Pulmonary/Chest: Effort normal. No respiratory distress.   Musculoskeletal:   R SI tender. Lipoma present near R SI joint also tender.   Neurological: She is alert and oriented to person, place, and time.   Skin: Capillary refill takes less than 2 seconds.   Psychiatric: She has a normal mood and affect.         Results:   No testing ordered today    Assessment and Plan      Chrissy Looney is a 58 yo cisgender woman who presents today for:    1. Chronic bilateral low back pain without sciatica  Will not continue with OMT as patient did not find it " helpful.  Trigger point injection today per procedure note below  Advised she can come back in 3-4 weeks for repeat if she finds it helpful.  Unclear if excision of lipoma would improve pain - could consider surgical referral for this if pain not well managed by CBD and trigger point injections + PT.  - INJECTION SNGL/MULT TRIGGER POINT, 1 OR 2 MUSCLES       There are no discontinued medications.    Options for treatment and follow-up care were reviewed with the patient. Chrissy Looney  engaged in the decision making process and verbalized understanding of the options discussed and agreed with the final plan.    Kenroy Bautista DO  [facility] Resident PGY-2  [phone]          [facility] Trigger Point Injection Note    Chrissy here forTrigger point injection to treat trigger point pain  Consent: Verbal consent was obtained and risks (worsened pain infection, allergy and benefits were reviewed}    Preoperative Diagnosis: Trigger point  Postoperative Diagnosis: same    Technique:   2-3 mL of 1% lidocaine without epinephrine was injected into the body of the following multifidis muscle(s) as noted in the PE section  Skin prep Alcohol wipes   Anesthesia none  EBL:   none  Complications:  No  Tolerance:  Pt tolerated procedure well and was in stable condition.       Follow up: Pt was instructed to call if bleeding, severe pain    Provider: Brooks Correa MD was present and supervised the procedure performed by Kenroy Bautista DO

## 2019-09-13 ASSESSMENT — PATIENT HEALTH QUESTIONNAIRE - PHQ9: SUM OF ALL RESPONSES TO PHQ QUESTIONS 1-9: 0

## 2019-10-16 ENCOUNTER — ANCILLARY PROCEDURE (OUTPATIENT)
Dept: MAMMOGRAPHY | Facility: CLINIC | Age: 59
End: 2019-10-16
Attending: INTERNAL MEDICINE
Payer: COMMERCIAL

## 2019-10-16 DIAGNOSIS — Z12.31 VISIT FOR SCREENING MAMMOGRAM: ICD-10-CM

## 2019-10-16 PROCEDURE — 77067 SCR MAMMO BI INCL CAD: CPT

## 2019-10-17 NOTE — RESULT ENCOUNTER NOTE
Please call patient to inform of normal mammogram. Next due in 1-2 years depending on patient preference.  Thanks!  - Kenroy Bautista, DO

## 2019-10-24 ENCOUNTER — ALLIED HEALTH/NURSE VISIT (OUTPATIENT)
Dept: FAMILY MEDICINE | Facility: CLINIC | Age: 59
End: 2019-10-24
Payer: COMMERCIAL

## 2019-10-24 DIAGNOSIS — Z23 NEED FOR PROPHYLACTIC VACCINATION AND INOCULATION AGAINST INFLUENZA: Primary | ICD-10-CM

## 2019-10-24 NOTE — NURSING NOTE
"Injectable Influenza Immunization Documentation    1.  Has the patient received the information for the injectable influenza vaccine? YES     2. Is the patient 6 months of age or older? YES     3. Does the patient have any of the following contraindications?         Severe allergy to eggs? No     Severe allergic reaction to previous influenza vaccines? No   Severe allergy to latex? No       History of Guillain-[place] syndrome? No     Currently have a temperature greater than 100.4F? No        4.  Severely egg allergic patients should have flu vaccine eligibility assessed by an MD, RN, or pharmacist, and those who received flu vaccine should be observed for 15 min by an MD, RN, Pharmacist, Medical Technician, or member of clinic staff.\": YES    5. Latex-allergic patients should be given latex-free influenza vaccine Yes. Please reference the Vaccine latex table to determine if your clinic s product is latex-containing.       Vaccination given by [facility] CMA      \  "

## 2019-11-07 ENCOUNTER — OFFICE VISIT (OUTPATIENT)
Dept: AUDIOLOGY | Facility: CLINIC | Age: 59
End: 2019-11-07
Payer: COMMERCIAL

## 2019-11-07 DIAGNOSIS — H90.3 SENSORY HEARING LOSS, BILATERAL: Primary | ICD-10-CM

## 2019-12-17 ENCOUNTER — OFFICE VISIT (OUTPATIENT)
Dept: FAMILY MEDICINE | Facility: CLINIC | Age: 59
End: 2019-12-17
Payer: COMMERCIAL

## 2019-12-17 VITALS
SYSTOLIC BLOOD PRESSURE: 135 MMHG | HEART RATE: 88 BPM | OXYGEN SATURATION: 97 % | TEMPERATURE: 98 F | RESPIRATION RATE: 18 BRPM | BODY MASS INDEX: 31.14 KG/M2 | DIASTOLIC BLOOD PRESSURE: 89 MMHG | WEIGHT: 169.2 LBS | HEIGHT: 62 IN

## 2019-12-17 DIAGNOSIS — H69.91 EUSTACHIAN TUBE DYSFUNCTION, RIGHT: Primary | ICD-10-CM

## 2019-12-17 ASSESSMENT — MIFFLIN-ST. JEOR: SCORE: 1300.74

## 2019-12-17 ASSESSMENT — PAIN SCALES - GENERAL: PAINLEVEL: SEVERE PAIN (6)

## 2019-12-17 NOTE — PROGRESS NOTES
"       HPI       Chrissy Looney is a 59 year old  who presents for   Chief Complaint   Patient presents with     Ear Problem     Patient is complaining of ear pain for [id number]      10 days of R ear pain. Last ear infection was 2 years ago, has had them a few times and was treated. Ear drops given weren't helpful, but then resolution with amoxicillin. Says the symptoms usually occur during winter time. Currently no other symptoms, denies headache, rhinorrhea, toothache. Hearing aid in R ear. Tylenol helps and it's been overall improving. Doesn't drink much water.     +++++++  Problem, Medication and Allergy Lists were reviewed and updated if needed..    Patient is an established patient of this clinic..         Review of Systems:   Review of Systems  6 point ROS negative besides as described above       Physical Exam:     Vitals:    12/17/19 0824   BP: 135/89   Pulse: 88   Resp: 18   Temp: 98  F (36.7  C)   TempSrc: Oral   SpO2: 97%   Weight: 76.7 kg (169 lb 3.2 oz)   Height: 1.575 m (5' 2\")     Body mass index is 30.95 kg/m .  Vitals were reviewed and were normal     Physical Exam  Constitutional:       General: She is not in acute distress.     Appearance: Normal appearance. She is not ill-appearing.   HENT:      Right Ear: Tympanic membrane, ear canal and external ear normal.      Left Ear: Tympanic membrane, ear canal and external ear normal.      Ears:      Comments: No tenderness to movement of pinna.  Mild tenderness to palpation just below ear, behind jaw     Nose: Nose normal. No congestion or rhinorrhea.      Mouth/Throat:      Pharynx: Oropharynx is clear. No oropharyngeal exudate or posterior oropharyngeal erythema.   Neck:      Musculoskeletal: No muscular tenderness.   Lymphadenopathy:      Cervical: No cervical adenopathy.   Skin:     General: Skin is warm and dry.   Neurological:      Mental Status: She is alert.           Results:   No testing ordered today    Assessment and Plan      Chrissy Looney is a " 59 year old female with a pmh significant for chronic sinusitis, bilateral sensory hearing loss with hearing aid in R ear presenting today with R ear pain for 10 days.   Chrissy was seen today for ear problem.    Diagnoses and all orders for this visit:    Eustachian tube dysfunction, right        1. R ear pain  Likely eustachian tube dysfunction given benign exam, age, and time of year. Discussed water intake. If pain not improving or worsening, will reassess for infection as she is at increased risk for otitis media with prolonged tube blockage.   - Encourage increase water intake   - F/u to clinic PRN if no improvement       There are no discontinued medications.    Options for treatment and follow-up care were reviewed with the patient. Chrissy Looney  engaged in the decision making process and verbalized understanding of the options discussed and agreed with the final plan.    Trav Ortiz, MS3    Preceptor Attestation:  I was present with the medical student who participated in the service and in the documentation of this note. I have verified the history and personally performed the physical exam and medical decision making. I have verified the content of the note, which accurately reflects my assessment of the patient and the plan of care.   Supervising Physician:  Wild Hudson MD.

## 2019-12-17 NOTE — PROGRESS NOTES
Preceptor Attestation:   Patient seen, evaluated and discussed with the resident. I have verified the content of the note, which accurately reflects my assessment of the patient and the plan of care.   Supervising Physician:  Ede Chaidez MD.

## 2020-03-31 NOTE — PROGRESS NOTES
"Family Medicine Telephone Visit Note           Telephone Visit Consent   Patient was verbally read the following and verbal consent was obtained.  \"I understand that I may revoke this request for a phone visit at any time.  This consent will automatically  3 months from the signed date and time.\"    Name person giving consent:  Patient   Date verbal consent given:  2020  Time verbal consent given:  8:09 AM      Chief Complaint   Patient presents with     chest tightness     Current Outpatient Medications   Medication Sig Dispense Refill     acetaminophen (TYLENOL) 500 MG tablet Take 1-2 tablets (500-1,000 mg) by mouth every 6 hours as needed for mild pain 1 Bottle 11     Calcium Citrate-Vitamin D (CALCIUM CITRATE PETITE/VIT D) 200-250 MG-UNIT TABS Take 1 tablet by mouth daily 60 tablet 11     cetirizine (ZYRTEC) 10 MG tablet Take 1 tablet (10 mg) by mouth daily 90 tablet 3     lisinopril-hydrochlorothiazide (PRINZIDE/ZESTORETIC) 20-12.5 MG tablet Take 1 tablet by mouth daily 90 tablet 3     conjugated estrogens (PREMARIN) 0.625 MG/GM vaginal cream Place 0.5 g vaginally twice a week (Patient not taking: Reported on 2020) 30 g 3     diclofenac (VOLTAREN) 1 % topical gel Apply 4 grams to knees or 2 grams to hands four times daily using enclosed dosing card. (Patient not taking: Reported on 2019) 100 g 1     guaiFENesin (MUCINEX) 600 MG 12 hr tablet Take 2 tablets (1,200 mg) by mouth 2 times daily (Patient not taking: Reported on 2019) 30 tablet 0     naproxen (NAPROSYN) 500 MG tablet Use 500 mg two times daily with meals as needed. (Patient not taking: Reported on 2020) 60 tablet 3     omeprazole (PRILOSEC) 20 MG DR capsule Take 1 capsule (20 mg) by mouth daily (Patient not taking: Reported on 2019) 90 capsule 1     ranitidine (ZANTAC) 150 MG tablet Take 1 tablet (150 mg) by mouth 2 times daily (Patient not taking: Reported on 2019) 60 tablet 1     No Known Allergies              "   HPI   Patients name: Chrissy  Appointment start time:  8:11 AM    1. Chest tightness  Will come and go, no cough or fever, no aggravating or alleviating factors. Worse with activity. Nothing in the neck or shoulder. Mild shortness of breath with the tightness. Denies prior similar symptoms. Denies any pain in the chest. Denies a pressure sensation. Denies any pain, redness, swelling in legs. When she gets the tightness, she rests until it goes away. Denies palpitations or racing heart beat. Denies any episodes unprovoked by activity. Denies any n/v/diaphoresis. Denies GERD (has had it before), no NSAID use.          Assessment and Plan   Chrissy was seen today for chest tightness.    Diagnoses and all orders for this visit:    Chest tightness  -     EKG 12-lead complete w/read - Clinics; Future  -     Basic Metabolic Panel ([facility]); Future  -     CBC with Plt ([facility]); Future  -     Lipid Cascade ([facility]); Future  -     Hemoglobin A1c ([facility]); Future    Concern is for possible MI given her age, HTN, and status as a former smoker.  Advised her to come to clinic for same day appt, will obtain labs and EKG. See today's clinic note for further evaluation.    Refilled medications that would be required in the next 3 months.     After Visit Information:  Patient declined AVS     Appointment end time: 8:22 AM  This is a telephone visit that took 11 minutes.      Clinician location:  [facility] Resident PGY-2  [phone]

## 2020-04-01 ENCOUNTER — VIRTUAL VISIT (OUTPATIENT)
Dept: FAMILY MEDICINE | Facility: CLINIC | Age: 60
End: 2020-04-01
Payer: COMMERCIAL

## 2020-04-01 ENCOUNTER — OFFICE VISIT (OUTPATIENT)
Dept: FAMILY MEDICINE | Facility: CLINIC | Age: 60
End: 2020-04-01
Payer: COMMERCIAL

## 2020-04-01 VITALS
BODY MASS INDEX: 31.83 KG/M2 | DIASTOLIC BLOOD PRESSURE: 90 MMHG | HEART RATE: 107 BPM | OXYGEN SATURATION: 97 % | WEIGHT: 174 LBS | TEMPERATURE: 98 F | SYSTOLIC BLOOD PRESSURE: 142 MMHG

## 2020-04-01 DIAGNOSIS — E03.9 HYPOTHYROIDISM, UNSPECIFIED TYPE: ICD-10-CM

## 2020-04-01 DIAGNOSIS — R73.02 GLUCOSE INTOLERANCE (IMPAIRED GLUCOSE TOLERANCE): ICD-10-CM

## 2020-04-01 DIAGNOSIS — R07.89 CHEST TIGHTNESS: Primary | ICD-10-CM

## 2020-04-01 DIAGNOSIS — R06.09 EXERTIONAL DYSPNEA: Primary | ICD-10-CM

## 2020-04-01 DIAGNOSIS — I10 ESSENTIAL HYPERTENSION: ICD-10-CM

## 2020-04-01 DIAGNOSIS — E66.09 CLASS 1 OBESITY DUE TO EXCESS CALORIES WITH SERIOUS COMORBIDITY AND BODY MASS INDEX (BMI) OF 31.0 TO 31.9 IN ADULT: ICD-10-CM

## 2020-04-01 DIAGNOSIS — E78.5 HYPERLIPIDEMIA LDL GOAL <100: ICD-10-CM

## 2020-04-01 DIAGNOSIS — R07.89 CHEST TIGHTNESS: ICD-10-CM

## 2020-04-01 DIAGNOSIS — Z87.891 FORMER SMOKER: ICD-10-CM

## 2020-04-01 DIAGNOSIS — E66.811 CLASS 1 OBESITY DUE TO EXCESS CALORIES WITH SERIOUS COMORBIDITY AND BODY MASS INDEX (BMI) OF 31.0 TO 31.9 IN ADULT: ICD-10-CM

## 2020-04-01 LAB
BUN SERPL-MCNC: 14.5 MG/DL (ref 7–19)
CALCIUM SERPL-MCNC: 9.4 MG/DL (ref 8.5–10.1)
CHLORIDE SERPLBLD-SCNC: 98.6 MMOL/L (ref 98–110)
CHOLEST SERPL-MCNC: 214.7 MG/DL (ref 0–200)
CHOLEST/HDLC SERPL: 5.5 {RATIO} (ref 0–5)
CK SERPL-CCNC: 51 U/L (ref 30–225)
CO2 SERPL-SCNC: 27.9 MMOL/L (ref 20–32)
CREAT SERPL-MCNC: 0.8 MG/DL (ref 0.5–1)
GFR SERPL CREATININE-BSD FRML MDRD: 83.3 ML/MIN/1.7 M2
GLUCOSE SERPL-MCNC: 122.4 MG'DL (ref 70–99)
HBA1C MFR BLD: 4.8 % (ref 4.1–5.7)
HCT VFR BLD AUTO: 49.1 % (ref 35–47)
HDLC SERPL-MCNC: 38.8 MG/DL
HEMOGLOBIN: 14.7 G/DL (ref 11.7–15.7)
LDLC SERPL CALC-MCNC: 133 MG/DL (ref 0–129)
MCH RBC QN AUTO: 28.4 PG (ref 26.5–35)
MCHC RBC AUTO-ENTMCNC: 29.9 G/DL (ref 32–36)
MCV RBC AUTO: 94.7 FL (ref 78–100)
NT-PROBNP SERPL-MCNC: 20 PG/ML (ref 0–125)
PLATELET # BLD AUTO: 291 K/UL (ref 150–450)
POTASSIUM SERPL-SCNC: 3.6 MMOL/L (ref 3.3–4.5)
RBC # BLD AUTO: 5.18 M/UL (ref 3.8–5.2)
SODIUM SERPL-SCNC: 135.9 MMOL/L (ref 132.6–141.4)
T4 FREE SERPL-MCNC: 0.59 NG/DL (ref 0.76–1.46)
TRIGL SERPL-MCNC: 216.2 MG/DL (ref 0–150)
TROPONIN I SERPL-MCNC: <0.015 UG/L (ref 0–0.04)
TSH SERPL DL<=0.005 MIU/L-ACNC: 67.23 MU/L (ref 0.4–4)
VLDL CHOLESTEROL: 43.2 MG/DL (ref 7–32)
WBC # BLD AUTO: 7.7 K/UL (ref 4–11)

## 2020-04-01 RX ORDER — ASPIRIN 325 MG
325 TABLET ORAL ONCE
Status: DISCONTINUED | OUTPATIENT
Start: 2020-04-01 | End: 2021-04-14

## 2020-04-01 RX ORDER — ATORVASTATIN CALCIUM 40 MG/1
40 TABLET, FILM COATED ORAL DAILY
Qty: 90 TABLET | Refills: 0 | Status: SHIPPED | OUTPATIENT
Start: 2020-04-01 | End: 2020-06-30

## 2020-04-01 RX ORDER — METOPROLOL SUCCINATE 25 MG/1
25 TABLET, EXTENDED RELEASE ORAL DAILY
Qty: 30 TABLET | Refills: 0 | Status: SHIPPED | OUTPATIENT
Start: 2020-04-01 | End: 2020-04-29

## 2020-04-01 RX ORDER — NITROGLYCERIN 0.3 MG/1
TABLET SUBLINGUAL
Qty: 60 TABLET | Refills: 1 | Status: SHIPPED | OUTPATIENT
Start: 2020-04-01 | End: 2021-04-14

## 2020-04-01 RX ORDER — LEVOTHYROXINE SODIUM 50 UG/1
50 TABLET ORAL DAILY
Qty: 60 TABLET | Refills: 0 | Status: SHIPPED | OUTPATIENT
Start: 2020-04-01 | End: 2020-05-12

## 2020-04-01 NOTE — PROGRESS NOTES
Preceptor Attestation:   Patient seen, evaluated and discussed with the resident. I personally viewed the EKG and agree with the interpretation documented by the resident. I have verified the content of the note, which accurately reflects my assessment of the patient and the plan of care.   Supervising Physician:  Augusta Cardozo MD.

## 2020-04-01 NOTE — PROGRESS NOTES
Preceptor Attestation:  I discussed the patient with the resident. I have verified the content of the note, which accurately reflects my assessment of the patient and the plan of care.   Supervising Physician:  Augusta Cardozo MD.

## 2020-04-01 NOTE — PROGRESS NOTES
HPI       Chrissy Looney is a 60 year old  who presents for   Chief Complaint   Patient presents with     Respiratory Problems     Patient states chest feels like it is full. The symptoms started on Thursday. She denies any pain but states it does feel tight.      1. Chest tightness  6 days since symptom onset, occurs only with activity, no symptoms at rest. Will come and go, no cough or fever, no aggravating or alleviating factors.  Nothing in the neck or shoulder. Mild shortness of breath with the tightness. Denies prior similar symptoms. Denies any pain in the chest. Denies a pressure sensation. Denies any pain, redness, swelling in legs. When she gets the tightness, she rests until it goes away. Denies palpitations or racing heart beat. . Denies any n/v/diaphoresis. Denies GERD (has had it before), no NSAID use. Denies fever, cough, myalgias.  Has been more anxious 2/2 COVID-19 pandemic, but has been socially isolating. Only household contact is her , who is working at [employer]. She reports frequent handwashing and disinfecting of surfaces, has  change clothes, shower, wash hands as soon as he comes home.  Is a former smoker, quit 2004, also has h/o prediabetes. FHx: negative for MI, but Mom had a CVA.    Problem, Medication and Allergy Lists were reviewed and updated if needed..    Patient is an established patient of this clinic..         Review of Systems:   Review of Systems         Physical Exam:     Vitals:    04/01/20 0930 04/01/20 0931 04/01/20 1038   BP: (!) 164/101 139/88 (!) 142/90   BP Location: Left arm Left arm Left arm   Patient Position: Sitting Sitting Sitting   Cuff Size: Adult Regular Adult Regular Adult Regular   Pulse: 116  107   Temp: 98  F (36.7  C)     TempSrc: Oral     SpO2: 99%  97%   Weight: 78.9 kg (174 lb)       Body mass index is 31.83 kg/m .  Vital signs normal except mildly elevated BP and tachycardia     Physical Exam  Constitutional:       General: She  is not in acute distress.     Appearance: Normal appearance. She is obese.   HENT:      Head: Normocephalic and atraumatic.      Nose: Nose normal.   Cardiovascular:      Rate and Rhythm: Regular rhythm. Tachycardia present.      Pulses: Normal pulses.      Heart sounds: Normal heart sounds.   Pulmonary:      Effort: Pulmonary effort is normal.      Breath sounds: Normal breath sounds.   Abdominal:      General: Abdomen is flat.      Palpations: Abdomen is soft.      Tenderness: There is no abdominal tenderness.   Musculoskeletal:      Right lower leg: No edema.      Left lower leg: No edema.   Skin:     Comments: Hands erythematous and dry from frequent handwashing, no skin openings/sores.   Neurological:      General: No focal deficit present.      Mental Status: She is alert and oriented to person, place, and time.   Psychiatric:      Comments: Mildly anxious                EKG Interpretation:      Interpreted by Britney Bautista DO  Time reviewed:9:41 AM   Symptoms at time of EKG: asymptomatic  Rhythm: Sinus tachycardia  Rate: Tachycardia  Axis: Normal  Ectopy: None  Conduction: Normal  ST Segments/ T Waves: No ST-T wave changes and No acute ischemic changes  Q Waves: None  Comparison to prior: No old EKG available    Clinical Impression: sinus tachycardia        Results:      Results from this visit  Results for orders placed or performed in visit on 04/01/20   Hemoglobin A1c ([facility])     Status: None   Result Value Ref Range    Hemoglobin A1C 4.8 4.1 - 5.7 %   Lipid Cascade ([facility])     Status: Abnormal   Result Value Ref Range    Cholesterol 214.7 (H) 0.0 - 200.0 mg/dL    Cholesterol/HDL Ratio 5.5 (H) 0.0 - 5.0    HDL Cholesterol 38.8 (L) >40.0 mg/dL    Triglycerides 216.2 (H) 0.0 - 150.0 mg/dL    VLDL Cholesterol 43.2 (H) 7.0 - 32.0 mg/dL    LDL Cholesterol Calculated 133 (H) 0 - 129 mg/dL   CBC with Plt ([facility])     Status: Abnormal   Result Value Ref Range    WBC 7.7 4.0 - 11.0 K/uL    RBC  5.18 3.80 - 5.20 M/uL    Hemoglobin 14.7 11.7 - 15.7 g/dL    Hematocrit 49.1 (H) 35.0 - 47.0 %    MCV 94.7 78.0 - 100.0 fL    MCH 28.4 26.5 - 35.0 pg    MCHC 29.9 (L) 32.0 - 36.0 g/dL    Platelets 291.0 150.0 - 450.0 K/uL   Basic Metabolic Panel ([facility])     Status: Abnormal   Result Value Ref Range    Calcium 9.4 8.5 - 10.1 mg/dL    Chloride 98.6 98.0 - 110.0 mmol/L    Carbon Dioxide 27.9 20.0 - 32.0 mmol/L    Creatinine 0.8 0.5 - 1.0 mg/dL    Glucose 122.4 (H) 70.0 - 99.0 mg'dL    Potassium 3.6 3.3 - 4.5 mmol/L    Sodium 135.9 132.6 - 141.4 mmol/L    GFR Estimate 83.3 >60.0 mL/min/1.7 m2    GFR Estimate If Black >90 >60.0 mL/min/1.7 m2    Urea Nitrogen 14.5 7.0 - 19.0 mg/dL   TSH with free T4 reflex     Status: Abnormal   Result Value Ref Range    TSH 67.23 (H) 0.40 - 4.00 mU/L   Troponin I     Status: None   Result Value Ref Range    Troponin I ES <0.015 0.000 - 0.045 ug/L   CK total     Status: None   Result Value Ref Range    CK Total 51 30 - 225 U/L   N terminal pro BNP outpatient     Status: None   Result Value Ref Range    N-Terminal Pro Bnp 20 0 - 125 pg/mL   T4 free     Status: Abnormal   Result Value Ref Range    T4 Free 0.59 (L) 0.76 - 1.46 ng/dL       Assessment and Plan        Chrissy was seen today for respiratory problems.    Diagnoses and all orders for this visit:    Exertional dyspnea  -     Troponin I  -     CK total  -     N terminal pro BNP outpatient  -     metoprolol succinate ER (TOPROL-XL) 25 MG 24 hr tablet; Take 1 tablet (25 mg) by mouth daily  -     atorvastatin (LIPITOR) 40 MG tablet; Take 1 tablet (40 mg) by mouth daily  -     nitroGLYcerin (NITROSTAT) 0.3 MG sublingual tablet; For chest pain place 1 tablet under the tongue every 5 minutes for 3 doses. If symptoms persist 5 minutes after 1st dose call 911.  -     aspirin (ASA) 81 MG tablet; Take 1 tablet (81 mg) by mouth daily  -     aspirin (ASA) tablet 325 mg  -     Echo Stress Echocardiogram; Future  -     T4 free  -     T4  free    Chest tightness  -     Hemoglobin A1c ([facility])  -     Lipid Cascade ([facility])  -     CBC with Plt ([facility])  -     Basic Metabolic Panel ([facility])  -     TSH with free T4 reflex  -     Troponin I    Hyperlipidemia LDL goal <100    Essential hypertension    Glucose intolerance (impaired glucose tolerance)    Former smoker    Class 1 obesity due to excess calories with serious comorbidity and body mass index (BMI) of 31.0 to 31.9 in adult    Hypothyroidism, unspecified type  -     levothyroxine (SYNTHROID/LEVOTHROID) 50 MCG tablet; Take 1 tablet (50 mcg) by mouth daily      Day 6 of dyspnea/angina on exertion in 61 yo with h/o HTN, obesity, prior pre-diabetes (now normal A1C), and new hyperlipidemia, former smoker (quit 16 yrs ago), FHx of CVA. No acute MI or obvious Q wave on EKG today. Ideally, would obtain stress echo, however availability is seriously limited due to COVID-19. Would also like to avoid unnecessary ER utilization due to the pandemic. Have r/o anemia, will also obtain TFTs, troponin, BNP and CK. Exam not c/w with DVT/PE or acute CHF.   Based on labs and risk factors, will order and discuss if any form of stress test would be available. In the meantime, will manage medically as if she has CAD - start high intensity statin today, beta-blocker, and aspirin (325 mg today,and 81 mg daily thereon). She is already on an ace-inhbitor. Will have clinic visit in 1 week to re-check vitals and labs.     There are no discontinued medications.    Options for treatment and follow-up care were reviewed with the patient. Chrissy Looney  engaged in the decision making process and verbalized understanding of the options discussed and agreed with the final plan.    DO [facility] Resident PGY-2  [phone]      Later in the afternoon, after the visit:  Reviewed labs with patient over the phone, normal cardiac markers (reassuring), mild hyperlipidemia, and significant  hypothyroidism. Discussed, likely not sick enough to qualify for stress test during current COVID concerns, so will stay on meds started today until able to get stress echo and eval for ischemic heart disease.  Will also start 50 mcg levothyroxine daily. Labs in 3-4 weeks to monitor treatment response and adjust dose. Will still keep 1 week clinic f/u appt too.  Patient understands and is in agreement with plan.  Kenroy Bautista, DO

## 2020-04-01 NOTE — PATIENT INSTRUCTIONS
Here is the plan from today's visit  On today's EKG, I don't see a big honkin active heart attack, but your symptoms are pretty classic for either being set up for a heart attack, or having had one in the somewhat distant past.   We are going to assume that you are either set up for/have recently had a minor heart attack and start you on meds that will protect your heart- starting a daily statin (cholesterol pill), metoprolol (heart pill), and baby aspirin.   You'll also get an as needed nitroglycerin pill - place it under your tongue with chest tightness with activity. If the tightness doesn't get better after trying 3 doses of the nitroglycerin, call our clinic line.    I'm also getting extra labs today to help us piece together if you did have a recent heart attack, and will use these to talk with the hospital to see if we can get you a stress test, which will tell us if a stent would be helpful now. I'll call when I know more about if we can get a stress test or not.  We may end up needing to re-evaluate this all once the pandemic is over.    1. Chest tightness    - Hemoglobin A1c ([facility])  - Lipid Cascade ([facility])  - CBC with Plt ([facility])  - Basic Metabolic Panel ([facility])  - TSH with free T4 reflex  - Troponin I    2. Exertional dyspnea    - Troponin I  - CK total  - N terminal pro BNP outpatient  - metoprolol succinate ER (TOPROL-XL) 25 MG 24 hr tablet; Take 1 tablet (25 mg) by mouth daily  Dispense: 30 tablet; Refill: 0  - atorvastatin (LIPITOR) 40 MG tablet; Take 1 tablet (40 mg) by mouth daily  Dispense: 90 tablet; Refill: 0  - nitroGLYcerin (NITROSTAT) 0.3 MG sublingual tablet; For chest pain place 1 tablet under the tongue every 5 minutes for 3 doses. If symptoms persist 5 minutes after 1st dose call 911.  Dispense: 60 tablet; Refill: 1  - aspirin (ASA) 81 MG tablet; Take 1 tablet (81 mg) by mouth daily  Dispense: 90 tablet; Refill: 1  - aspirin (ASA) tablet 325 mg      Please call or return to  clinic if your symptoms don't go away.    Follow up plan  Clinic visit with me in 1 week.  Call [phone] (24/7) with any questions/symptoms    Thank you for coming to [facility] today.  Lab Testing:  **If you had lab testing today and your results are reassuring or normal they will be mailed to you or sent through [employer] within 7 days.   **If the lab tests need quick action we will call you with the results.  The phone number we will call with results is # [phone] (home) . If this is not the best number please call our clinic and change the number.  Medication Refills:  If you need any refills please call your pharmacy and they will contact us.   If you need to  your refill at a new pharmacy, please contact the new pharmacy directly. The new pharmacy will help you get your medications transferred faster.   Scheduling:  If you have any concerns about today's visit or wish to schedule another appointment please call our office during normal business hours [phone] (8-5:00 M-F)  If a referral was made to a [facility] and you don't get a call from central scheduling please call [phone].  If a Mammogram was ordered for you at [facility] call [phone] to schedule or change your appointment.  If you had an XRay/CT/Ultrasound/MRI ordered the number is [phone] to schedule or change your radiology appointment.   Medical Concerns:  If you have urgent medical concerns please call [phone] at any time of the day.    Kenroy Bautista,

## 2020-04-10 ENCOUNTER — OFFICE VISIT (OUTPATIENT)
Dept: FAMILY MEDICINE | Facility: CLINIC | Age: 60
End: 2020-04-10
Payer: COMMERCIAL

## 2020-04-10 VITALS
RESPIRATION RATE: 18 BRPM | HEIGHT: 62 IN | HEART RATE: 86 BPM | DIASTOLIC BLOOD PRESSURE: 79 MMHG | TEMPERATURE: 99 F | BODY MASS INDEX: 31.98 KG/M2 | SYSTOLIC BLOOD PRESSURE: 128 MMHG | OXYGEN SATURATION: 96 % | WEIGHT: 173.8 LBS

## 2020-04-10 DIAGNOSIS — R07.89 CHEST TIGHTNESS: ICD-10-CM

## 2020-04-10 DIAGNOSIS — E03.9 HYPOTHYROIDISM, UNSPECIFIED TYPE: Primary | ICD-10-CM

## 2020-04-10 DIAGNOSIS — R06.09 EXERTIONAL DYSPNEA: ICD-10-CM

## 2020-04-10 ASSESSMENT — MIFFLIN-ST. JEOR: SCORE: 1316.6

## 2020-04-10 NOTE — Clinical Note
Can we schedule her for a lab-only visit in 5 weeks, and a video visit with me at least 2-3 days after to discuss her thyroid

## 2020-04-10 NOTE — PROGRESS NOTES
"       HPI       Chrissy Looney is a 60 year old  who presents for   Chief Complaint   Patient presents with     RECHECK     chest tighness, in middle of chest x1 week pt states sx's are getting better.     1. Dyspnea on exertion  It's been slowly getting better since last week  Able to walk into clinic without chest tightness at all - walked here from home (< 10 minute walk). She has not needed to use any nitroglycerin.  Otherwise asymptomatic: no chest pain, leg swelling, weakness, fatigue.  Taking her statin, metoprolol, aspirin, and levothyroxine without issue. No dizziness or palpitations.      Problem, Medication and Allergy Lists were reviewed and updated if needed..    Patient is an established patient of this clinic..         Review of Systems:   Review of Systems         Physical Exam:     Vitals:    04/10/20 0821 04/10/20 0833   BP: (!) 144/93 128/79   BP Location:  Right arm   Patient Position:  Sitting   Cuff Size:  Adult Regular   Pulse: 86    Resp: 18    Temp: 99  F (37.2  C)    TempSrc: Oral    SpO2: 96%    Weight: 78.8 kg (173 lb 12.8 oz)    Height: 1.575 m (5' 2\")      Body mass index is 31.79 kg/m .  Vitals were reviewed and were normal     Physical Exam  Constitutional:       General: She is not in acute distress.     Appearance: Normal appearance. She is obese.   Neck:      Musculoskeletal: Neck supple. No muscular tenderness.      Comments: No thyromegaly or nodules  Cardiovascular:      Rate and Rhythm: Normal rate and regular rhythm.      Heart sounds: Normal heart sounds.   Pulmonary:      Effort: Pulmonary effort is normal. No respiratory distress.      Breath sounds: Normal breath sounds.   Musculoskeletal:      Right lower leg: No edema.      Left lower leg: No edema.   Lymphadenopathy:      Cervical: No cervical adenopathy.   Neurological:      General: No focal deficit present.      Mental Status: She is alert and oriented to person, place, and time.   Psychiatric:         Mood and Affect: " Mood normal.         Behavior: Behavior normal.             Results:   Results from last visit:  Office Visit on 04/01/2020   Component Date Value Ref Range Status     Hemoglobin A1C 04/01/2020 4.8  4.1 - 5.7 % Final     Cholesterol 04/01/2020 214.7* 0.0 - 200.0 mg/dL Final     Cholesterol/HDL Ratio 04/01/2020 5.5* 0.0 - 5.0 Final     HDL Cholesterol 04/01/2020 38.8* >40.0 mg/dL Final     Triglycerides 04/01/2020 216.2* 0.0 - 150.0 mg/dL Final     VLDL Cholesterol 04/01/2020 43.2* 7.0 - 32.0 mg/dL Final     LDL Cholesterol Calculated 04/01/2020 133* 0 - 129 mg/dL Final     WBC 04/01/2020 7.7  4.0 - 11.0 K/uL Final     RBC 04/01/2020 5.18  3.80 - 5.20 M/uL Final     Hemoglobin 04/01/2020 14.7  11.7 - 15.7 g/dL Final     Hematocrit 04/01/2020 49.1* 35.0 - 47.0 % Final     MCV 04/01/2020 94.7  78.0 - 100.0 fL Final     MCH 04/01/2020 28.4  26.5 - 35.0 pg Final     MCHC 04/01/2020 29.9* 32.0 - 36.0 g/dL Final     Platelets 04/01/2020 291.0  150.0 - 450.0 K/uL Final     Calcium 04/01/2020 9.4  8.5 - 10.1 mg/dL Final     Chloride 04/01/2020 98.6  98.0 - 110.0 mmol/L Final     Carbon Dioxide 04/01/2020 27.9  20.0 - 32.0 mmol/L Final     Creatinine 04/01/2020 0.8  0.5 - 1.0 mg/dL Final     Glucose 04/01/2020 122.4* 70.0 - 99.0 mg'dL Final     Potassium 04/01/2020 3.6  3.3 - 4.5 mmol/L Final     Sodium 04/01/2020 135.9  132.6 - 141.4 mmol/L Final     GFR Estimate 04/01/2020 83.3  >60.0 mL/min/1.7 m2 Final     GFR Estimate If Black 04/01/2020 >90  >60.0 mL/min/1.7 m2 Final     Urea Nitrogen 04/01/2020 14.5  7.0 - 19.0 mg/dL Final     TSH 04/01/2020 67.23* 0.40 - 4.00 mU/L Final     Troponin I ES 04/01/2020 <0.015  0.000 - 0.045 ug/L Final    Comment: The 99th percentile for upper reference range is 0.045 ug/L.  Troponin values   in the range of 0.045 - 0.120 ug/L may be associated with risks of adverse   clinical events.       CK Total 04/01/2020 51  30 - 225 U/L Final     N-Terminal Pro Bnp 04/01/2020 20  0 - 125 pg/mL Final     Comment:    Reference range shown and results flagged as abnormal are for the outpatient,   non acute settings. Establishing a baseline value for each individual patient   is useful for follow-up.  Suggested inpatient cut points for confirming diagnosis of CHF in an acute   setting are:   >450 pg/mL (age 18 to less than 50)   >900 pg/mL (age 50 to less than 75)   >1800 pg/mL (75 yrs and older)  An inpatient or emergency department NT-proPBNP <300 pg/mL effectively rules   out acute CHF, with 99% negative predictive value.        T4 Free 04/01/2020 0.59* 0.76 - 1.46 ng/dL Final       EKG Interpretation:  By Britney Bautista DO    Indication:f/u dyspnea on exertion  Symptoms at time of EKG: None   Interpretation: Normal Sinus Rhythm, normal axis, normal intervals, no acute ST/T changes c/w ischemia, no LVH by voltage criteria, unchanged from previous tracings, no Q waves present  Comparison with previous EKGs:No change from previous    Patient informed at visit.      Assessment and Plan        Chrissy was seen today for recheck and labs only.    Diagnoses and all orders for this visit:    Hypothyroidism, unspecified type  -     TSH with free T4 reflex; Future    Chest tightness  Exertional dyspnea  -     EKG 12-lead complete w/read - Clinics    Chrissy is a 59 yo former smoker with PMH obesity and HLD, formerly pre-diabetic, and FHx CVA and hypothyroidism who presented last week with 6 days of mild dyspnea on exertion. Workup in the meantime has been negative for acute MI, and follow up EKG shows no signs of Q-wave or other infarction in the interim. We've been treating her medically for CAD until we can obtain a stress echo once COVID-19 concerns allow for scheduling of non-urgent cases.     Incidentally she was found to have hypothyroidism and I started her on 50 mcg levothyroxine. This is more likely the cause of her symptoms. Plan for repeat TSH for dose adjustment in 5 weeks- lab only and virtual visit to  discuss dose adjustments (video preferred).         There are no discontinued medications.    Options for treatment and follow-up care were reviewed with the patient. Chrissy Looney  engaged in the decision making process and verbalized understanding of the options discussed and agreed with the final plan.    DO [facility] Resident PGY-2  [phone]

## 2020-04-10 NOTE — PATIENT INSTRUCTIONS
Here is the plan from today's visit    1. Hypothyroidism, unspecified type  Stay on the synthroid  Re-check your thyroid lab in 5 weeks - lab only visit  We'll do a phone or video visit after to see if we need to change the dose    2. Chest tightness  3. Exertional dyspnea  Your heart looks great!  Stay on the heart meds until after the pandemic and I can get a stress test (atorvastatin, metoprolol, aspirin, your BP meds)  I am much less worried about a heart attack.  - EKG 12-lead complete w/read - Clinics      Please call or return to clinic if your symptoms don't go away.    Follow up plan  Lab only visit in 5 weeks  Phone or clinic visit with me in 6 weeks to adjust thyroid meds    Thank you for coming to [facility] today.  Lab Testing:  **If you had lab testing today and your results are reassuring or normal they will be mailed to you or sent through [facility] within 7 days.   **If the lab tests need quick action we will call you with the results.  The phone number we will call with results is # [phone] (home) . If this is not the best number please call our clinic and change the number.  Medication Refills:  If you need any refills please call your pharmacy and they will contact us.   If you need to  your refill at a new pharmacy, please contact the new pharmacy directly. The new pharmacy will help you get your medications transferred faster.   Scheduling:  If you have any concerns about today's visit or wish to schedule another appointment please call our office during normal business hours [phone] (8-5:00 M-F)  If a referral was made to a [facility] and you don't get a call from central scheduling please call [phone].  If a Mammogram was ordered for you at [facility] call [phone] to schedule or change your appointment.  If you had an XRay/CT/Ultrasound/MRI ordered the number is [phone] to schedule or change your radiology appointment.    Medical Concerns:  If you have urgent medical concerns please call [phone] at any time of the day.    Kenroy Bautista, DO

## 2020-04-13 NOTE — PROGRESS NOTES
Preceptor Attestation:   Patient seen, evaluated and discussed with the resident. I personally viewed the EKG and agree with the interpretation documented by the resident. I have verified the content of the note, which accurately reflects my assessment of the patient and the plan of care.   Supervising Physician:  Kirill Anderson MD.

## 2020-05-11 DIAGNOSIS — E03.9 HYPOTHYROIDISM, UNSPECIFIED TYPE: ICD-10-CM

## 2020-05-11 LAB
T4 FREE SERPL-MCNC: 0.95 NG/DL (ref 0.76–1.46)
TSH SERPL DL<=0.005 MIU/L-ACNC: 22.8 MU/L (ref 0.4–4)

## 2020-05-12 DIAGNOSIS — E03.9 HYPOTHYROIDISM, UNSPECIFIED TYPE: Primary | ICD-10-CM

## 2020-05-12 DIAGNOSIS — J30.89 ENVIRONMENTAL AND SEASONAL ALLERGIES: ICD-10-CM

## 2020-05-12 RX ORDER — CETIRIZINE HYDROCHLORIDE 10 MG/1
10 TABLET ORAL DAILY
Qty: 90 TABLET | Refills: 3 | Status: SHIPPED | OUTPATIENT
Start: 2020-05-12 | End: 2021-05-11

## 2020-05-12 RX ORDER — LEVOTHYROXINE SODIUM 75 UG/1
75 TABLET ORAL DAILY
Qty: 60 TABLET | Refills: 1 | Status: SHIPPED | OUTPATIENT
Start: 2020-05-12 | End: 2021-02-04 | Stop reason: DRUGHIGH

## 2020-05-12 NOTE — TELEPHONE ENCOUNTER
Cetirizine 10 mg tabs    LAST FILLED DATE: 02/20/2020  LAST FILLED STRENGTH: 10 MG  LAST FILLED QTY: 90  LAST OFFICE VISIT: 04/10/2020

## 2020-05-18 DIAGNOSIS — I10 BENIGN ESSENTIAL HYPERTENSION: ICD-10-CM

## 2020-05-18 RX ORDER — LISINOPRIL AND HYDROCHLOROTHIAZIDE 12.5; 2 MG/1; MG/1
1 TABLET ORAL DAILY
Qty: 90 TABLET | Refills: 3 | Status: SHIPPED | OUTPATIENT
Start: 2020-05-18 | End: 2021-05-11

## 2020-05-18 NOTE — TELEPHONE ENCOUNTER
Drug:Lisinopril-hydrochlorothiazide 20-12.5mg  Last Fill Date: 4/6/2020  Last Fill Quantity: 30  Last Office Visit: 4/10/2020    Thank you,  Eleanor Philip, PharmD  [facility]  [phone]

## 2020-06-08 ENCOUNTER — VIRTUAL VISIT (OUTPATIENT)
Dept: FAMILY MEDICINE | Facility: CLINIC | Age: 60
End: 2020-06-08
Payer: COMMERCIAL

## 2020-06-08 DIAGNOSIS — R53.83 OTHER FATIGUE: Primary | ICD-10-CM

## 2020-06-08 DIAGNOSIS — F43.0 ACUTE REACTION TO STRESS: ICD-10-CM

## 2020-06-08 ASSESSMENT — ANXIETY QUESTIONNAIRES
GAD7 TOTAL SCORE: 5
1. FEELING NERVOUS, ANXIOUS, OR ON EDGE: SEVERAL DAYS
6. BECOMING EASILY ANNOYED OR IRRITABLE: NOT AT ALL
3. WORRYING TOO MUCH ABOUT DIFFERENT THINGS: NOT AT ALL
5. BEING SO RESTLESS THAT IT IS HARD TO SIT STILL: NOT AT ALL
2. NOT BEING ABLE TO STOP OR CONTROL WORRYING: MORE THAN HALF THE DAYS
7. FEELING AFRAID AS IF SOMETHING AWFUL MIGHT HAPPEN: MORE THAN HALF THE DAYS

## 2020-06-08 ASSESSMENT — PATIENT HEALTH QUESTIONNAIRE - PHQ9
5. POOR APPETITE OR OVEREATING: NOT AT ALL
SUM OF ALL RESPONSES TO PHQ QUESTIONS 1-9: 6

## 2020-06-08 NOTE — PROGRESS NOTES
Preceptor Attestation:  I spoke with the patient on the phone. Patient discussed with the resident. Assessment and plan reviewed with resident and agreed upon.   Supervising Physician:  DO [facility]

## 2020-06-08 NOTE — PATIENT INSTRUCTIONS
Here is the plan from today's visit    1. Other fatigue  Sounds much more like this might be your thyroid rather than your very normal reaction to all the upheaval going on. Come on in and get your thyroid checked at the lab and I'll call to see if we need a dose adjustment. Keep taking your thyroid medicine until we know more. If you still have trouble with sleep and worry, here are some apps that can be helpful for mindfulness/meditation:  Directory of APA reviewed apps:  http://www.apatraumadivision.org/images/files/Mental%20Health%20App%20Review.pdf    Insomnia    CBT-I  - All the tools you need to tune up sleep - sleep diary, tools and exercises for quieting your mind, learn about sleep and how to improve it, set reminder messages with tips, motivation and alarms to change sleep habits.    SleepBot - tracks sleep and provides sleep hygiene advice    Stress Management    [id number] - Stress management tool. Includes breathing exercises.    Headspace - Guided 10 minute meditations with animated video about how to meditate (free for the first 10 days)    Insight Timer - Guided meditations and customizable meditation timer with peaceful sounds    Meditation  - Detailed instruction in 9 forms of mindfulness, guided exercises, education about mindfulness    Stop, Breathe, Think - Develop mindfulness and compassion. Guides you to check in to the moment, mindful breathing, broaden your perspective, and strengthen your sense of calm.     Take a Break! - Two guided meditations, 7 minute break and 13 minute stress relieft    Virtual Hope Box - Includes coping, relaxation, distraction, and positive thinking tools.    Insight timer - different types of meditations, can sort by amount of time    Mindfulness  -  [employer] [place]    Depression    7 Cups -  provides trained listeners 24 hours a day    MoodTools - provide resources for tracking thought patterns and building skills to manage  depression    Booster Yousif -  helps teens and young adults improve their resilience    What's Up? - Helps patients monitor their mood and reframe their thoughts    Anxiety CBT    GGOC - Learn to challenge negative thinking    FearTools - provide resources for tracking thought patterns and building skills        Please call or return to clinic if your symptoms don't go away.    Follow up plan  I'll call when labs are back    Thank you for coming to [facility] today.  Lab Testing:  **If you had lab testing today and your results are reassuring or normal they will be mailed to you or sent through [facility] within 7 days.   **If the lab tests need quick action we will call you with the results.  The phone number we will call with results is # [phone] (home) . If this is not the best number please call our clinic and change the number.  Medication Refills:  If you need any refills please call your pharmacy and they will contact us.   If you need to  your refill at a new pharmacy, please contact the new pharmacy directly. The new pharmacy will help you get your medications transferred faster.   Scheduling:  If you have any concerns about today's visit or wish to schedule another appointment please call our office during normal business hours [phone] (8-5:00 M-F)  If a referral was made to a [facility] and you don't get a call from central scheduling please call [phone].  If a Mammogram was ordered for you at [facility] call [phone] to schedule or change your appointment.  If you had an XRay/CT/Ultrasound/MRI ordered the number is [phone] to schedule or change your radiology appointment.   Medical Concerns:  If you have urgent medical concerns please call [phone] at any time of the day.    Kenroy Bautista,
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
## 2020-06-09 DIAGNOSIS — E03.9 HYPOTHYROIDISM, UNSPECIFIED TYPE: ICD-10-CM

## 2020-06-09 LAB — TSH SERPL DL<=0.005 MIU/L-ACNC: 11.3 MU/L (ref 0.4–4)

## 2020-06-09 ASSESSMENT — ANXIETY QUESTIONNAIRES: GAD7 TOTAL SCORE: 5

## 2020-06-10 ENCOUNTER — TELEPHONE (OUTPATIENT)
Dept: FAMILY MEDICINE | Facility: CLINIC | Age: 60
End: 2020-06-10

## 2020-06-10 DIAGNOSIS — E03.9 HYPOTHYROIDISM, UNSPECIFIED TYPE: Primary | ICD-10-CM

## 2020-06-10 RX ORDER — LEVOTHYROXINE SODIUM 100 UG/1
100 TABLET ORAL DAILY
Qty: 60 TABLET | Refills: 1 | Status: SHIPPED | OUTPATIENT
Start: 2020-06-10 | End: 2020-10-01

## 2020-06-19 ENCOUNTER — OFFICE VISIT (OUTPATIENT)
Dept: AUDIOLOGY | Facility: CLINIC | Age: 60
End: 2020-06-19
Payer: COMMERCIAL

## 2020-06-19 DIAGNOSIS — H90.3 SENSORY HEARING LOSS, BILATERAL: Primary | ICD-10-CM

## 2020-08-03 DIAGNOSIS — E03.9 HYPOTHYROIDISM, UNSPECIFIED TYPE: ICD-10-CM

## 2020-08-03 LAB — TSH SERPL DL<=0.005 MIU/L-ACNC: 0.73 MU/L (ref 0.4–4)

## 2020-08-03 NOTE — LETTER
August 5, 2020      Chrissy Looney  [address] [facility] [address]        Dear Chrissy,    Thank you for getting your care at [facility]. Please see below for your test results.    Resulted Orders   TSH   Result Value Ref Range    TSH 0.73 0.40 - 4.00 mU/L     Your thyroid blood test is finally normal now! We can now go about 6 months until re-checking it, but this current thyroid dose you're on is the right one for you.  If you have any concerns about these results please call and leave a message for me or send a [employer] message to the clinic.    Sincerely,    Kenroy Bautista, DO

## 2020-08-04 ENCOUNTER — TELEPHONE (OUTPATIENT)
Dept: FAMILY MEDICINE | Facility: CLINIC | Age: 60
End: 2020-08-04

## 2020-08-04 NOTE — TELEPHONE ENCOUNTER
RN LM for patient to call back about results. Please relay message below from Dr. Bautista when she calls back.   Iris Mcclelland RN        ----- Message from Britney Bautista DO sent at 8/4/2020  8:23 AM CDT -----  Please call patient to let her know her thyroid is normal. This is a good dose of the medication for her to stay on. No more monitoring needed of her thyroid for a few months.  Kenroy Bautista DO

## 2020-08-04 NOTE — TELEPHONE ENCOUNTER
Patient returned call, as she was unable to retrieve voicemail. Relayed message from Dr Bautista as noted by RN below. Patient expressed understanding.

## 2020-08-04 NOTE — RESULT ENCOUNTER NOTE
Please call patient to let her know her thyroid is normal. This is a good dose of the medication for her to stay on. No more monitoring needed of her thyroid for a few months.  Kenroy Bautista, DO

## 2020-08-26 DIAGNOSIS — R06.09 EXERTIONAL DYSPNEA: ICD-10-CM

## 2020-08-26 RX ORDER — METOPROLOL SUCCINATE 25 MG/1
25 TABLET, EXTENDED RELEASE ORAL DAILY
Qty: 30 TABLET | Refills: 3 | Status: SHIPPED | OUTPATIENT
Start: 2020-08-26 | End: 2020-12-22

## 2020-08-26 NOTE — TELEPHONE ENCOUNTER
Metoprolol Succ ER 25 tabs    LAST FILLED DATE: 07/21/2020  LAST FILLED STRENGTH: 25 MG  LAST FILLED QTY: 30  LAST OFFICE VISIT: 06/08/2020

## 2020-09-29 ENCOUNTER — TELEPHONE (OUTPATIENT)
Dept: FAMILY MEDICINE | Facility: CLINIC | Age: 60
End: 2020-09-29

## 2020-09-29 DIAGNOSIS — R06.09 EXERTIONAL DYSPNEA: Primary | ICD-10-CM

## 2020-09-29 NOTE — TELEPHONE ENCOUNTER
Aspirin 81 mg last filled 6/27/2020 for #90    Thank you,  Eleanor Philip, PharmD  [facility]  [phone]

## 2020-10-16 ENCOUNTER — ALLIED HEALTH/NURSE VISIT (OUTPATIENT)
Dept: FAMILY MEDICINE | Facility: CLINIC | Age: 60
End: 2020-10-16
Payer: COMMERCIAL

## 2020-10-16 DIAGNOSIS — Z23 NEED FOR PROPHYLACTIC VACCINATION AND INOCULATION AGAINST INFLUENZA: Primary | ICD-10-CM

## 2020-10-16 PROCEDURE — 90682 RIV4 VACC RECOMBINANT DNA IM: CPT

## 2020-10-16 PROCEDURE — 90471 IMMUNIZATION ADMIN: CPT

## 2020-11-13 ENCOUNTER — TELEPHONE (OUTPATIENT)
Dept: FAMILY MEDICINE | Facility: CLINIC | Age: 60
End: 2020-11-13

## 2020-11-13 ENCOUNTER — TELEPHONE (OUTPATIENT)
Dept: AUDIOLOGY | Facility: CLINIC | Age: 60
End: 2020-11-13

## 2020-11-13 DIAGNOSIS — H91.90 HEARING DISORDER, UNSPECIFIED LATERALITY: Primary | ICD-10-CM

## 2020-11-13 NOTE — TELEPHONE ENCOUNTER
Called patient to let her know that we received her right hearing aid in the mail. Patient had mailed the device with a note saying it had broken and needed to be fixed.     Earmold on hearing aid was no longer attached and had split in multiple places. Cleaned and checked hearing aid. Was able to change tone hook and tubing and glue rip in earmold. Listening check good.     Let patient know that it may be time for a new earmold soon as this one likely will not be able to be glued again. Patient is interested in new earmolds and new hearing aids. Gave patient number to call to schedule an appointment with Audiology. She would like repaired device to be mailed back to her. Mailed device back via [employer].

## 2020-11-13 NOTE — TELEPHONE ENCOUNTER
[facility] phone call message - order or referral request from patient:     Order or referral being requested: Requested order    Additional Details:hearing aids    Referral only -Specialt Location  Patient request referral  For Audiology  Would you please send     OK to leave a message on voice mail? Yes    Primary language: English      needed? No    Call taken on November 13, 2020 at 3:34 PM by Tayo Segundo    Order request route to [facility] TRIAGE   Referrals Route to [facility] ([place] CARE COORDINATOR

## 2020-11-16 NOTE — TELEPHONE ENCOUNTER
Referral placed for [facility] at [facility].  Will route to care coordinator to assist with scheduling.    Kenroy Bautista DO

## 2020-11-18 NOTE — TELEPHONE ENCOUNTER
11/18/20 Internal referral automatically sent to our Physician Referral Service. They will contact patient directly to schedule.    Virginia Olguin  Care Coordinator

## 2020-11-23 ENCOUNTER — OFFICE VISIT (OUTPATIENT)
Dept: FAMILY MEDICINE | Facility: CLINIC | Age: 60
End: 2020-11-23
Payer: COMMERCIAL

## 2020-11-23 VITALS
TEMPERATURE: 98.2 F | SYSTOLIC BLOOD PRESSURE: 127 MMHG | HEIGHT: 62 IN | RESPIRATION RATE: 18 BRPM | BODY MASS INDEX: 31.98 KG/M2 | DIASTOLIC BLOOD PRESSURE: 84 MMHG | HEART RATE: 86 BPM | WEIGHT: 173.8 LBS | OXYGEN SATURATION: 98 %

## 2020-11-23 DIAGNOSIS — J44.9 CHRONIC OBSTRUCTIVE PULMONARY DISEASE, UNSPECIFIED COPD TYPE (H): ICD-10-CM

## 2020-11-23 DIAGNOSIS — Z11.59 ENCOUNTER FOR SCREENING FOR OTHER VIRAL DISEASES: Primary | ICD-10-CM

## 2020-11-23 DIAGNOSIS — Z12.11 SPECIAL SCREENING FOR MALIGNANT NEOPLASMS, COLON: ICD-10-CM

## 2020-11-23 DIAGNOSIS — L50.9 URTICARIA: ICD-10-CM

## 2020-11-23 PROCEDURE — U0003 INFECTIOUS AGENT DETECTION BY NUCLEIC ACID (DNA OR RNA); SEVERE ACUTE RESPIRATORY SYNDROME CORONAVIRUS 2 (SARS-COV-2) (CORONAVIRUS DISEASE [COVID-19]), AMPLIFIED PROBE TECHNIQUE, MAKING USE OF HIGH THROUGHPUT TECHNOLOGIES AS DESCRIBED BY CMS-2020-01-R: HCPCS | Performed by: FAMILY MEDICINE

## 2020-11-23 PROCEDURE — 99214 OFFICE O/P EST MOD 30 MIN: CPT | Mod: CS | Performed by: FAMILY MEDICINE

## 2020-11-23 PROCEDURE — 82274 ASSAY TEST FOR BLOOD FECAL: CPT | Performed by: FAMILY MEDICINE

## 2020-11-23 RX ORDER — PREDNISONE 20 MG/1
40 TABLET ORAL DAILY
Qty: 10 TABLET | Refills: 0 | Status: SHIPPED | OUTPATIENT
Start: 2020-11-23 | End: 2020-11-28

## 2020-11-23 ASSESSMENT — ENCOUNTER SYMPTOMS
SHORTNESS OF BREATH: 0
COUGH: 0
SINUS PAIN: 0
GASTROINTESTINAL NEGATIVE: 1
RHINORRHEA: 0
SORE THROAT: 0
FATIGUE: 0
ACTIVITY CHANGE: 0
FEVER: 0
PSYCHIATRIC NEGATIVE: 1

## 2020-11-23 ASSESSMENT — MIFFLIN-ST. JEOR: SCORE: 1316.6

## 2020-11-23 NOTE — PROGRESS NOTES
"       HPI       Chrissy Looney is a 60 year old  who presents for   Chief Complaint   Patient presents with     Derm Problem     x 4 days, felt a tingling on thursday night and the day after to have noticed some redness and bumps, pt infroms to have no itchiness but tender to the touch       Rash/Lesion  Onset: 4 days ago    Description:   Location: R upper arm  Color: red, hyperpigmented  Character: round, raised, painful, red wheals - about 4-6 cm in size with small papules in the middle  Itching (Pruritis): no  Pain?:Yes Details: \"my arm aches\"    Progression of Symptoms:  Maybe a little bit better now    Accompanying Signs & Symptoms:  Fever: no  Body aches or joint pain:  no  Sore throat symptoms:no  Recent cold symptoms: no    History:   Previous similar rash: no    Precipitating factors:   Exposure to similar rash: no  New exposures: {no  Recent travel: no  New Medication: no    What makes it better?:    Therapies Tried and outcome: takes claritin daily for sinus issues and allergies    No known COVID contacts  Does not work outside the home, no travel   works at [employer] - hasn't been sick, no rash on him  No one has visited recently  No h/o bedbugs or other infestations    Takes hot baths regularly  Tends toward dry skin in the winter    Problem, Medication and Allergy Lists were reviewed and updated if needed..    Patient is an established patient of this clinic..         Review of Systems:   Review of Systems   Constitutional: Negative for activity change, fatigue and fever.   HENT: Negative for congestion, rhinorrhea, sinus pain and sore throat.    Respiratory: Negative for cough and shortness of breath.    Gastrointestinal: Negative.    Genitourinary: Negative.    Skin: Positive for rash (see HPI).   Psychiatric/Behavioral: Negative.             Physical Exam:     Vitals:    11/23/20 1116 11/23/20 1118   BP: (!) 151/93 127/84   Pulse: 86    Resp: 18    Temp: 98.2  F (36.8  C)    TempSrc: Oral  " "  SpO2: 98%    Weight: 78.8 kg (173 lb 12.8 oz)    Height: 1.575 m (5' 2\")      Body mass index is 31.79 kg/m .  Vitals were reviewed and were normal     Physical Exam  Constitutional:       General: She is not in acute distress.     Appearance: Normal appearance.   HENT:      Head: Normocephalic and atraumatic.   Eyes:      Extraocular Movements: Extraocular movements intact.   Cardiovascular:      Heart sounds: Normal heart sounds.   Pulmonary:      Effort: Pulmonary effort is normal.      Breath sounds: Normal breath sounds.   Skin:     General: Skin is warm.      Findings: Rash present. Rash is urticarial.          Neurological:      Mental Status: She is alert.           Results:   Results are ordered and pending    Assessment and Plan        Patient Instructions   Here is the plan from today's visit    1. Urticaria  Uncertain origin right now, instructions given for treatment of hives (warm - not hot - baths, use moisturizer, etc.)  Lab work up warranted if this lasts longer than 6 weeks  Because of chronic conditions and possibility that rash can be a presenting sign of COVID, testing was done today despite not meeting full CDC criteria  - Symptomatic COVID-19 Virus (Coronavirus) by PCR  - predniSONE (DELTASONE) 20 MG tablet; Take 2 tablets (40 mg) by mouth daily for 5 days  Dispense: 10 tablet; Refill: 0    2. Encounter for screening for other viral diseases  - Symptomatic COVID-19 Virus (Coronavirus) by PCR    3. Chronic obstructive pulmonary disease, unspecified COPD type (H)  Known issue that I take into account for their medical decisions, no current exacerbations or new concerns.    4. Special screening for malignant neoplasms, colon  - Fecal colorectal cancer screen FITT; Future      Please call or return to clinic if your symptoms don't go away.    Follow up plan - follow up if no improvement in the next 1 week (and labs should be done if lasting >6 wks)  - due for annual visit and pap in the next 6 " months         There are no discontinued medications.    Options for treatment and follow-up care were reviewed with the patient. Chrissy Looney  engaged in the decision making process and verbalized understanding of the options discussed and agreed with the final plan.    Brunilda Mac MD

## 2020-11-23 NOTE — PATIENT INSTRUCTIONS
Here is the plan from today's visit    1. Urticaria  Uncertain origin right now, see below for treatment of hives  - Symptomatic COVID-19 Virus (Coronavirus) by PCR  - predniSONE (DELTASONE) 20 MG tablet; Take 2 tablets (40 mg) by mouth daily for 5 days  Dispense: 10 tablet; Refill: 0    2. Encounter for screening for other viral diseases  - Symptomatic COVID-19 Virus (Coronavirus) by PCR    3. Chronic obstructive pulmonary disease, unspecified COPD type (H)  No current issues    4. Special screening for malignant neoplasms, colon  - Fecal colorectal cancer screen FITT; Future      Please call or return to clinic if your symptoms don't go away.    Follow up plan - follow up if no improvement in the next 1 week (and labs should be done if lasting >6 wks)  - due for annual visit and pap in the next 6 months    Thank you for coming to [facility] today.  Lab Testing:  **If you had lab testing today and your results are reassuring or normal they will be mailed to you or sent through [employer] within 7 days.   **If the lab tests need quick action we will call you with the results.  The phone number we will call with results is # [phone] (home) . If this is not the best number please call our clinic and change the number.  Medication Refills:  If you need any refills please call your pharmacy and they will contact us.   If you need to  your refill at a new pharmacy, please contact the new pharmacy directly. The new pharmacy will help you get your medications transferred faster.   Scheduling:  If you have any concerns about today's visit or wish to schedule another appointment please call our office during normal business hours [phone] (8-5:00 M-F)  If a referral was made to a [facility] and you don't get a call from central scheduling please call [phone].  If a Mammogram was ordered for you at [facility] call [phone] to schedule or change your appointment.  If  you had an XRay/CT/Ultrasound/MRI ordered the number is [phone] to schedule or change your radiology appointment.   Medical Concerns:  If you have urgent medical concerns please call [phone] at any time of the day.  If you have a medical emergency please call 911.    Patient Education     Hives (Adult)  Hives are pink or red bumps on the skin. These bumps are also known as wheals. The bumps can itch, burn, or sting. Hives can occur anywhere on the body. They vary in size and shape and can form in clusters. Individual hives can appear and go away quickly. New hives may develop as old ones fade. Hives are common and usually harmless. They are not contagious. Occasionally, hives are a sign of a serious allergy.  Hives are often caused by an allergic reaction. They may occur from:    Certain foods, such as shellfish, nuts, tomatoes, or berries    Contact with something in the environment, such as pollens, animals, or mold    Certain medicines    Sun or cold air    Viral infections, such as a cold, the flu, or strep throat  If the hives continue to come and go over many weeks without any other symptoms (chronic hives), the cause may be very hard to figure out.  You may be prescribed medicines to ease swelling and itching. Follow all instructions when using these medicines. The hives will usually fade in a few days. But they can last for weeks or months.  Home care  Follow these tips:    Try to find the cause of the hives and eliminate it. Discuss possible causes with your healthcare provider. Your healthcare provider may ask you to keep track of the food you eat and your lifestyle to help find the cause of the hives.    Don t scratch the hives. Scratching will delay healing. To reduce itching, apply cool, wet compresses to the skin.    Dress in soft, loose cotton clothing.    Don t bathe in hot water. This can make the itching worse.    Apply an ice pack or cool pack wrapped in a thin towel to your skin. This  will help reduce redness and itching. But if your hives were caused by exposure to cold, then do not apply more cold to them.    You may use over-the counter antihistamines to reduce itching. Some older antihistamines, such as diphenhydramine and chlorpheniramine, are inexpensive. But they need to be taken often and may make you sleepy. They are best used at bedtime. Don t use diphenhydramine if you have glaucoma or have trouble urinating because of an enlarged prostate. Newer antihistamines, such as loratadine, cetirizine, levocetirizine, and fexofenadine, are generally more expensive. But they tend to have fewer side effects. They can be taken less often.    Another type of antihistamine is used to treat heartburn. This type includes ranitidine, nizatidine, famotidine, and cimetidine. These are sometimes used along with the above antihistamines if a single medicine is not working.    If the hives are severe and you do not respond well to other medicines, you may be given a steroid, such as prednisone, to take for a short time. Follow all instructions carefully when taking this medicine. Tell your healthcare provider about any side effects.  Follow-up care  Follow up with your healthcare provider if your symptoms don't get better in 2 days. Ask your provider about allergy testing if you have had a severe reaction or have had several episodes of hives. Allergy testing may help figure out what you are allergic to. You may need blood tests, a urine test, or skin tests.  When to seek medical advice  Call your healthcare provider right away if any of these occur:    Fever of 100.4 F (38.0 C) or higher, or as directed by your healthcare provider    Redness, swelling, or pain    Foul-smelling fluid coming from the rash  Call 911  Call 911 if any of the following occur:    Swelling of the face, throat, or tongue    Trouble breathing or swallowing    Dizziness, weakness, or fainting  Deepa last reviewed this educational  content on 6/1/2019 2000-2020 [employer]. [address] [place] [address], [place] [address]. All rights reserved. This information is not intended as a substitute for professional medical care. Always follow your healthcare professional's instructions.

## 2020-11-24 LAB
SARS-COV-2 RNA SPEC QL NAA+PROBE: NOT DETECTED
SPECIMEN SOURCE: NORMAL

## 2020-11-25 ENCOUNTER — TELEPHONE (OUTPATIENT)
Dept: FAMILY MEDICINE | Facility: CLINIC | Age: 60
End: 2020-11-25

## 2020-11-25 DIAGNOSIS — L50.9 URTICARIA: Primary | ICD-10-CM

## 2020-11-25 LAB — HEMOCCULT STL QL IA: NEGATIVE

## 2020-11-25 NOTE — TELEPHONE ENCOUNTER
Benadryl cream is over the counter - and while it helps with itching, it won't help this rash heal.  She is already taking an antihistamine at home, I will send oral benadryl that she could use at night (also over the counter, but if her insurance will cover it I'm willing to send it).  If the rash is worsening, I would strongly recommend the prednisone x 5 days.    Brunilda Mac MD

## 2020-11-25 NOTE — TELEPHONE ENCOUNTER
"Per Dr. Mac \"        Please call patient with results - let her know that the COVID test was negative!  I'm sure she and her  will be happy with that news.  She should still treat her rash as we discussed and follow up if it's not improving.      \"    RN called pt to relay. Pt verbalized understanding and stated she did not want to take the prednisone as she didn't like how if affected her last time she took it. Pt wondering if she should try a benadryl cream and if that could be sent to pharmacy    Routing to PCP to advise    Sanam Draper RN    "

## 2020-12-04 DIAGNOSIS — L50.9 URTICARIA: ICD-10-CM

## 2020-12-04 NOTE — TELEPHONE ENCOUNTER
Benadryl 25mg    LAST FILLED DATE: 11-  LAST FILLED STRENGTH: 25 MG  LAST FILLED QTY: 24  LAST OFFICE VISIT: 11-    Nola CRUZ CP @    [facility]  [address] [place] [address]  Phone: [phone]  Fax: [phone]

## 2020-12-09 ENCOUNTER — OFFICE VISIT (OUTPATIENT)
Dept: AUDIOLOGY | Facility: CLINIC | Age: 60
End: 2020-12-09
Payer: COMMERCIAL

## 2020-12-09 DIAGNOSIS — H90.3 SENSORINEURAL HEARING LOSS, BILATERAL: Primary | ICD-10-CM

## 2020-12-09 DIAGNOSIS — H91.90 HEARING DISORDER, UNSPECIFIED LATERALITY: ICD-10-CM

## 2020-12-09 PROCEDURE — 92557 COMPREHENSIVE HEARING TEST: CPT | Performed by: AUDIOLOGIST

## 2020-12-09 PROCEDURE — 92550 TYMPANOMETRY & REFLEX THRESH: CPT | Performed by: AUDIOLOGIST

## 2020-12-09 PROCEDURE — V5275 EAR IMPRESSION: HCPCS | Mod: LT | Performed by: AUDIOLOGIST

## 2020-12-09 PROCEDURE — 92591 PR HEARING AID EXAM BINAURAL: CPT | Performed by: AUDIOLOGIST

## 2020-12-09 NOTE — PROGRESS NOTES
AUDIOLOGY REPORT    SUBJECTIVE: Chrissy Looney is a 60 year old female who was seen in the Audiology Clinic at [facility] for audiologic evaluation and hearing aid consultation, referred by Britney Medel D.O. The patient has been [facility] on 4/16/2010 for assessment, and results indicated moderate to moderately severe sensorineural hearing loss bilaterally. The patient was fit with bilateral Phonak Nios micro III hearing aids with custom earmolds on 10/3/2010. Chrissy reports a possible decrease in hearing for both ears and occasional bilateral tinnitus. She denies bilateral pain, bilateral drainage, dizziness, or a history of ear surgeries. The patient is interested in new hearing aid technology.    OBJECTIVE:  Abuse Screening:  Do you feel unsafe at home or work/school? No  Do you feel threatened by someone? No  Does anyone try to keep you from having contact with others, or doing things outside of your home? No  Physical signs of abuse present? No     Fall Risk Screening:  Have you fallen two or more times in the past year? No  Have you fallen and had an injury in the past year? No    Otoscopic exam indicated ears are clear of cerumen bilaterally     Pure Tone Thresholds assessed using conventional audiometry with good reliability from 250-8000 Hz bilaterally using insert earphones and circumaural headphones     RIGHT:  Moderate to moderately severe sensorineural hearing loss    LEFT:    Moderate to severe sensorineural hearing loss    Tympanogram:    RIGHT: Normal eardrum mobility    LEFT:   Normal eardrum mobility    Reflexes (reported by stimulus ear):    RIGHT: Ipsilateral is absent at frequencies tested    RIGHT: Contralateral is absent at frequencies tested    LEFT:   Ipsilateral is absent at frequencies tested    LEFT:   Contralateral is absent at frequencies tested    Speech Reception Threshold:    RIGHT: 55 dB  HL    LEFT:   55 dB HL    Word Recognition Score:     RIGHT: 92% at 95 dB HL using NU-6 recorded word list    LEFT:   88% at 55 dB HL using NU-6 recorded word list    The patient is a hearing aid candidate. The patient would like to move forward with a hearing aid consultation today. Therefore, the patient was presented with different options for amplification to help aid in communication. Discussed styles, levels of technology, and rechargeable options.     The hearing aids mutually chosen were:  BILATERAL: Phonak Bolero M70-ND  COLOR: P1  BATTERY SIZE: Rechargeable  EARMOLD/TIPS: Skeleton earmolds    Bilateral earmold impressions were taken without incident.    ASSESSMENT: Bilateral sensorineural hearing loss. Compared to the patient's previous outside audiogram dated 4/16/2010, hearing has remained stable for the right ear and worsened by 10 dB at 250-1000 Hz for the left ear. Today s results were discussed with the patient in detail.     Reviewed purchase information and warranty information with the patient. The 45 day trial period was explained to the patient. The patient was given a copy of the [place] Department of Health consumer brochure on purchasing hearing instruments. Patient risk factors have been provided to the patient in writing prior to the sale of the hearing aid per FDA regulation. The risk factors are also available in the User Instructional Booklet to be presented on the day of the hearing aid fitting. Hearing aids ordered. Hearing aid consultation completed.    PLAN: Chrissy is scheduled to return in 2-3 weeks for a hearing aid fitting and programming. Purchase agreement will be completed on that date. She will be seen in [facility] prior to that appointment for updated medical clearance for hearing aid use. Repeat audiologic evaluation is recommended if changes are noted. Please call this clinic with questions regarding these results or recommendations.      Domenica Sanchez,  [facility]  Licensed Audiologist  [place] #[id number]

## 2020-12-10 NOTE — TELEPHONE ENCOUNTER
FUTURE VISIT INFORMATION      FUTURE VISIT INFORMATION:    Date: 2/4/2021    Time: 9 AM    Location: [facility]  REFERRAL INFORMATION:    Referring provider:  Dayanna Castañeda    Referring providers clinic:  [facility] - Audiology    Reason for visit/diagnosis: Medical Clearance for Hearing Aid Use    RECORDS REQUESTED FROM:       Clinic name Comments Records Status Imaging Status   [facility] 12/9/20 - AUD OV with Dayanna Castañeda  11/13/20 - Telephone note from Audiology  9/26/13 - ENT OV with Dr. Stephen Hanks 12/17/19 - PCC OV with Dr. Hudson  3/15/17, 7/21/16 - PCC OV with AMANDA Lechuga    [facility] - Procedure 12/9/20 - Audiogram Epic    [facility] 2/11/18 - ED OV with Dr. Velvet Ibarra

## 2020-12-22 DIAGNOSIS — R06.09 EXERTIONAL DYSPNEA: ICD-10-CM

## 2020-12-22 RX ORDER — METOPROLOL SUCCINATE 25 MG/1
25 TABLET, EXTENDED RELEASE ORAL DAILY
Qty: 30 TABLET | Refills: 3 | Status: SHIPPED | OUTPATIENT
Start: 2020-12-22 | End: 2021-04-14

## 2020-12-22 NOTE — TELEPHONE ENCOUNTER
Metoprolol succinate ER 25mg last filled 11/23/2020 for #30    Thank you,    Eleanor Philip, PharmD  [facility]  [phone]

## 2021-01-06 ENCOUNTER — DOCUMENTATION ONLY (OUTPATIENT)
Dept: CARE COORDINATION | Facility: CLINIC | Age: 61
End: 2021-01-06

## 2021-01-26 ENCOUNTER — OFFICE VISIT (OUTPATIENT)
Dept: AUDIOLOGY | Facility: CLINIC | Age: 61
End: 2021-01-26
Payer: COMMERCIAL

## 2021-01-26 DIAGNOSIS — H90.3 SENSORINEURAL HEARING LOSS, BILATERAL: Primary | ICD-10-CM

## 2021-01-26 PROCEDURE — V5266 BATTERY FOR HEARING DEVICE: HCPCS | Mod: NU | Performed by: AUDIOLOGIST

## 2021-02-03 NOTE — PATIENT INSTRUCTIONS
1. You were seen in the ENT Clinic today by Dr. Arcos.  If you have any questions or concerns after your appointment, please call   - Option 1: ENT Clinic: [phone]   - Option 2: Susan (Dr. Arcos's Nurse): [phone]    2.   Plan to return to clinic in 1 year with hearing test    3. Cleared for hearing aids      Susan Banuelos LPN  [facility] - Otolaryngology    The patient presents with a history of bilateral sensorineural hearing loss with a family history of such hearing loss. She has worn hearing aids in the past and she is scheduled to obtain new hearing aids. She will be seen again yearly to review Audiogram and Tympanograms and she will proceed with her scheduled Audiology visit today for fitting new hearing aids.

## 2021-02-04 ENCOUNTER — OFFICE VISIT (OUTPATIENT)
Dept: AUDIOLOGY | Facility: CLINIC | Age: 61
End: 2021-02-04
Payer: COMMERCIAL

## 2021-02-04 ENCOUNTER — PRE VISIT (OUTPATIENT)
Dept: OTOLARYNGOLOGY | Facility: CLINIC | Age: 61
End: 2021-02-04

## 2021-02-04 ENCOUNTER — OFFICE VISIT (OUTPATIENT)
Dept: OTOLARYNGOLOGY | Facility: CLINIC | Age: 61
End: 2021-02-04
Payer: COMMERCIAL

## 2021-02-04 VITALS — TEMPERATURE: 97.5 F | OXYGEN SATURATION: 98 %

## 2021-02-04 DIAGNOSIS — H90.3 SENSORINEURAL HEARING LOSS (SNHL), BILATERAL: Primary | ICD-10-CM

## 2021-02-04 DIAGNOSIS — H90.3 SENSORINEURAL HEARING LOSS, BILATERAL: Primary | ICD-10-CM

## 2021-02-04 PROCEDURE — V5264 EAR MOLD/INSERT: HCPCS | Mod: NU | Performed by: AUDIOLOGIST

## 2021-02-04 PROCEDURE — V5020 CONFORMITY EVALUATION: HCPCS | Performed by: AUDIOLOGIST

## 2021-02-04 PROCEDURE — V5160 DISPENSING FEE BINAURAL: HCPCS | Performed by: AUDIOLOGIST

## 2021-02-04 PROCEDURE — V5261 HEARING AID, DIGIT, BIN, BTE: HCPCS | Mod: NU | Performed by: AUDIOLOGIST

## 2021-02-04 PROCEDURE — 99203 OFFICE O/P NEW LOW 30 MIN: CPT | Performed by: OTOLARYNGOLOGY

## 2021-02-04 NOTE — NURSING NOTE
Chief Complaint   Patient presents with     Consult     medical clearance for hearing aids     Temperature 97.5  F (36.4  C), last menstrual period 11/04/2013, SpO2 98 %, not currently breastfeeding.    Malcom Knapp LPN

## 2021-02-04 NOTE — PROGRESS NOTES
The patient presents with a history of hearing loss. She has worn hearing aids in both ears in the past. She reports a long family history of progressive sensorineural hearing loss.  The patient reports a progressive hearing loss in both ears over at least the past two years. The patient denies sinusitis, rhinitis, facial pain, nasal obstruction or purulent nasal discharge. The patient denies chronic or recurrent tonsillitis, chronic or recurrent pharyngitis. The patient denies otalgia, otorrhea, eustachian tube dysfunction, ear infections, dizziness or tinnitus.     Her Audiogram and Tympanogram are reviewed with her and these demonstrate severe bilateral sensorineural hearing loss that is symmetric. Her word recognition scores are 92% on the right and 88% on the left. Her tympanograms are normal.     This patient is seen in consultation at the request of Dr. Britney Bautista.     All other systems were reviewed and they are either negative or they are not directly pertinent to this Otolaryngology examination.     Past Medical History:    Past Medical History:   Diagnosis Date     NO ACTIVE PROBLEMS        Past Surgical History:    Past Surgical History:   Procedure Laterality Date     NO HISTORY OF SURGERY         Medications:      Current Outpatient Medications:      acetaminophen (TYLENOL) 500 MG tablet, Take 1-2 tablets (500-1,000 mg) by mouth every 6 hours as needed for mild pain, Disp: 1 Bottle, Rfl: 11     aspirin (ASA) 81 MG EC tablet, Take 1 tablet (81 mg) by mouth daily, Disp: 90 tablet, Rfl: 1     atorvastatin (LIPITOR) 40 MG tablet, Take 1 tablet (40 mg) by mouth daily, Disp: 90 tablet, Rfl: 3     cetirizine (ZYRTEC) 10 MG tablet, Take 1 tablet (10 mg) by mouth daily, Disp: 90 tablet, Rfl: 3     diphenhydrAMINE (BENADRYL) 25 MG tablet, Take 1-2 tablets (25-50 mg) by mouth nightly as needed (itching, inflammation from rash), Disp: 30 tablet, Rfl: 11     famotidine (PEPCID) 20 MG tablet, Take 1 tablet (20  mg) by mouth 2 times daily as needed (stomach upset) (Patient not taking: Reported on 11/23/2020), Disp: 60 tablet, Rfl: 1     levothyroxine (SYNTHROID/LEVOTHROID) 100 MCG tablet, Take 1 tablet (100 mcg) by mouth daily, Disp: 90 tablet, Rfl: 1     levothyroxine (SYNTHROID/LEVOTHROID) 75 MCG tablet, Take 1 tablet (75 mcg) by mouth daily (Patient not taking: Reported on 11/23/2020), Disp: 60 tablet, Rfl: 1     lisinopril-hydrochlorothiazide (ZESTORETIC) 20-12.5 MG tablet, Take 1 tablet by mouth daily, Disp: 90 tablet, Rfl: 3     metoprolol succinate ER (TOPROL-XL) 25 MG 24 hr tablet, Take 1 tablet (25 mg) by mouth daily, Disp: 30 tablet, Rfl: 3     nitroGLYcerin (NITROSTAT) 0.3 MG sublingual tablet, For chest pain place 1 tablet under the tongue every 5 minutes for 3 doses. If symptoms persist 5 minutes after 1st dose call 911. (Patient not taking: Reported on 11/23/2020), Disp: 60 tablet, Rfl: 1     SUMAtriptan (IMITREX) 5 MG/ACT nasal spray, Spray 1 spray in nostril as needed for migraine May repeat in 2 hours. Max 8 sprays/24 hours. (Patient not taking: Reported on 11/23/2020), Disp: 1 each, Rfl: 3    Current Facility-Administered Medications:      aspirin (ASA) tablet 325 mg, 325 mg, Oral, Once, Augusta Cardozo MD    Allergies:    Patient has no known allergies.    Physical Examination:    The patient is a well developed, well nourished female in no apparent distress.  She is normocephalic, atraumatic with pupils equally round and reactive to light.    Oral Cavity Examination:  Normal mucosa with no masses or lesions  Nasal Examination: Normal mucosa with no masses or lesions  Ear Examination: Ear canals clear, tympanic membranes and middle ear spaces normal  Neurological Examination: Facial nerve function intact and symmetric  Integumentary Examination: No lesions on the skin of the head and neck  Neck Examination: No masses or lesions, no lymphadenopathy  Endocrine Examination: Normal thyroid  examination    Assessment and Plan:    The patient presents with a history of bilateral sensorineural hearing loss with a family history of such hearing loss. She has worn hearing aids in the past and she is scheduled to obtain new hearing aids. She will be seen again yearly to review Audiogram and Tympanograms and she will proceed with her scheduled Audiology visit today for fitting new hearing aids.     CC: Dr. Britney Bautista

## 2021-02-04 NOTE — PROGRESS NOTES
AUDIOLOGY REPORT    SUBJECTIVE: Chrissy Looney is a 60 year old female who was seen in the Audiology Clinic at [facility] for a fitting of bilateral Phonak Bolero M70-AZ hearing aids with custom earmolds. Previous results have revealed moderate to moderately severe sensorineural hearing loss for the right ear and moderate to severe sensorineural hearing loss for the left ear. The patient was given medical clearance to pursue amplification by Mack Arcos M.D. She is an experienced bilateral hearing aid user.     OBJECTIVE: The hearing aid conformity evaluation was completed.The hearing aids were placed and provided a good fit. Simulated real-ear measurements were completed on the AfterSteps system and were a good match to NAL-NL1 targets with soft sounds audible, moderate sounds comfortable, and loud sounds below discomfort. UCLs are verified through maximum power output measures and demonstrate appropriate limiting of loud inputs. Chrissy was oriented to proper hearing aid use, care, cleaning (no water, dry brush), batteries (rechargeable, low-battery signal), aid insertion/removal, user booklet, warranty information, storage cases, and other hearing aid details. The patient confirmed understanding of hearing aid use and care and showed proper insertion of the hearing aids while in the office today. Chrissy reported good volume and sound quality.   Hearing aids were programmed as follows:  Program 1: AutoSense  Program button and volume control were deactivated today.    EARS FIT: Bilateral  HEARING AID MODEL NAME: Phonak Bolero M70-AZ  HEARING AID STYLE: Behind-the-ear  EARMOLDS/TIP: Skeleton earmolds  SERIAL NUMBERS: Right: [id number] Left: [id number]  WARRANTY END DATE: 3/10/2024    ASSESSMENT: Bilateral Phonak Bolero M70-AZ hearing aids with custom earmolds were fit today. Verification measures were performed. Chrissy signed the Hearing Aid Purchase Agreement and was  given a copy, as well as details on her hearing aids. The patient was counseled that exact out of pocket amounts cannot be determined for hearing aid claims being sent to insurance. Any insurance coverage information presented to the patient is an estimate only, and is not a guarantee of payment. The patient has been advised to check with their own insurance.    PLAN: Chrissy will return for follow-up in 2-3 weeks for a hearing aid review appointment. Please call this clinic with questions regarding today s appointment.    Gladys Rodriguez, [facility]  Licensed Audiologist  [place] #[id number]

## 2021-02-08 PROBLEM — H90.3 SENSORINEURAL HEARING LOSS, BILATERAL: Status: ACTIVE | Noted: 2021-02-08

## 2021-02-26 ENCOUNTER — OFFICE VISIT (OUTPATIENT)
Dept: AUDIOLOGY | Facility: CLINIC | Age: 61
End: 2021-02-26
Payer: COMMERCIAL

## 2021-02-26 DIAGNOSIS — H90.3 SENSORINEURAL HEARING LOSS, BILATERAL: Primary | ICD-10-CM

## 2021-02-26 PROCEDURE — 99207 PR ASSESSMENT FOR HEARING AID: CPT | Performed by: AUDIOLOGIST

## 2021-02-26 NOTE — PROGRESS NOTES
AUDIOLOGY REPORT    SUBJECTIVE:Chrissy Looney is a 61 year old female who was seen in the Audiology Clinic at the [facility] on 2/26/2021  for a follow-up check regarding the fitting of new hearing aids. Previous results have revealed a bilateral sensorineural hearing loss.  The patient has been seen previously in this clinic and was fit with Binaural Phonak Bolero M70-R on 2/4/2021.  Chrissy reports that everything is going really well and she does not want any changes.     4th year audiology extern, Eneida Watkins, was present for today's appointment.    OBJECTIVE:   The International Outcome Inventory-Hearing Aids (IOI-HA) was administered today.The patient s responses to the 7 questions can be compared to normative data relative to how others are performing with their hearing aids, as well as focusing audiologic care and counseling.This patient s Quality of Life score (Question 7) was 5, which is above normative average.     Based on patient report, the following changes were made; No changes were made because she was very happy with the sound quality and volume.  She declined a volume control and does not want it connected with her phone..    Reviewed 45 day trial period, care, cleaning (no water, dry brush), batteries (rechargeable) insertion/removal, toxicity, low-battery signal), aid insertion/removal, volume adjustment (if applicable), user booklet, warranty information, storage cases, and other hearing aid details.     No charge visit today (in warranty hearing aid check).     ASSESSMENT: A follow-up appointment for hearing aid fitting was completed today. Changes to hearing aid was completed as outlined above.     PLAN:Chrissy will return for follow-up as needed, or at least every 6-9 months for cleaning and assessment of hearing aid.  . Please call this clinic with any questions regarding today s appointment.    Nguyen Pradhan, Aleta  Audiologist  [place] License   #[id number]

## 2021-03-29 DIAGNOSIS — R06.09 EXERTIONAL DYSPNEA: ICD-10-CM

## 2021-03-31 ENCOUNTER — IMMUNIZATION (OUTPATIENT)
Dept: NURSING | Facility: CLINIC | Age: 61
End: 2021-03-31
Payer: COMMERCIAL

## 2021-03-31 PROCEDURE — 91300 PR COVID VAC PFIZER DIL RECON 30 MCG/0.3 ML IM: CPT

## 2021-03-31 PROCEDURE — 0001A PR COVID VAC PFIZER DIL RECON 30 MCG/0.3 ML IM: CPT

## 2021-04-05 DIAGNOSIS — E03.9 HYPOTHYROIDISM, UNSPECIFIED TYPE: ICD-10-CM

## 2021-04-05 RX ORDER — LEVOTHYROXINE SODIUM 100 UG/1
100 TABLET ORAL DAILY
Qty: 90 TABLET | Refills: 0 | Status: SHIPPED | OUTPATIENT
Start: 2021-04-05 | End: 2021-07-07

## 2021-04-05 NOTE — TELEPHONE ENCOUNTER
Synthroid 100mcg    LAST FILLED DATE: 03-05-21  LAST FILLED STRENGTH: 100 McG  LAST FILLED QTY: 30  LAST OFFICE VISIT: 11-    Nola CRUZ CPhT @    [facility]  [address] [place] [address]  Phone: [phone]  Fax: [phone]

## 2021-04-14 ENCOUNTER — OFFICE VISIT (OUTPATIENT)
Dept: FAMILY MEDICINE | Facility: CLINIC | Age: 61
End: 2021-04-14
Payer: COMMERCIAL

## 2021-04-14 VITALS
BODY MASS INDEX: 31.62 KG/M2 | WEIGHT: 171.8 LBS | HEIGHT: 62 IN | HEART RATE: 77 BPM | TEMPERATURE: 97.6 F | DIASTOLIC BLOOD PRESSURE: 89 MMHG | SYSTOLIC BLOOD PRESSURE: 133 MMHG | OXYGEN SATURATION: 99 %

## 2021-04-14 DIAGNOSIS — E66.811 CLASS 1 OBESITY DUE TO EXCESS CALORIES WITH SERIOUS COMORBIDITY AND BODY MASS INDEX (BMI) OF 31.0 TO 31.9 IN ADULT: ICD-10-CM

## 2021-04-14 DIAGNOSIS — Z00.00 ROUTINE GENERAL MEDICAL EXAMINATION AT A HEALTH CARE FACILITY: Primary | ICD-10-CM

## 2021-04-14 DIAGNOSIS — I10 ESSENTIAL HYPERTENSION: ICD-10-CM

## 2021-04-14 DIAGNOSIS — F43.22 ADJUSTMENT DISORDER WITH ANXIOUS MOOD: ICD-10-CM

## 2021-04-14 DIAGNOSIS — B97.7 HIGH RISK HPV INFECTION: ICD-10-CM

## 2021-04-14 DIAGNOSIS — E03.9 HYPOTHYROIDISM, UNSPECIFIED TYPE: ICD-10-CM

## 2021-04-14 DIAGNOSIS — E66.09 CLASS 1 OBESITY DUE TO EXCESS CALORIES WITH SERIOUS COMORBIDITY AND BODY MASS INDEX (BMI) OF 31.0 TO 31.9 IN ADULT: ICD-10-CM

## 2021-04-14 LAB
CHOLEST SERPL-MCNC: 131 MG/DL
HBA1C MFR BLD: 6.2 % (ref 0–5.6)
HDLC SERPL-MCNC: 38 MG/DL
LDLC SERPL CALC-MCNC: 65 MG/DL
NONHDLC SERPL-MCNC: 93 MG/DL
TRIGL SERPL-MCNC: 141 MG/DL
TSH SERPL DL<=0.005 MIU/L-ACNC: 6.53 MU/L (ref 0.4–4)

## 2021-04-14 PROCEDURE — G0145 SCR C/V CYTO,THINLAYER,RESCR: HCPCS | Performed by: STUDENT IN AN ORGANIZED HEALTH CARE EDUCATION/TRAINING PROGRAM

## 2021-04-14 PROCEDURE — 87624 HPV HI-RISK TYP POOLED RSLT: CPT | Performed by: STUDENT IN AN ORGANIZED HEALTH CARE EDUCATION/TRAINING PROGRAM

## 2021-04-14 PROCEDURE — 86376 MICROSOMAL ANTIBODY EACH: CPT | Performed by: STUDENT IN AN ORGANIZED HEALTH CARE EDUCATION/TRAINING PROGRAM

## 2021-04-14 PROCEDURE — 83036 HEMOGLOBIN GLYCOSYLATED A1C: CPT | Performed by: STUDENT IN AN ORGANIZED HEALTH CARE EDUCATION/TRAINING PROGRAM

## 2021-04-14 PROCEDURE — 99396 PREV VISIT EST AGE 40-64: CPT | Mod: GC | Performed by: STUDENT IN AN ORGANIZED HEALTH CARE EDUCATION/TRAINING PROGRAM

## 2021-04-14 PROCEDURE — 36415 COLL VENOUS BLD VENIPUNCTURE: CPT | Performed by: STUDENT IN AN ORGANIZED HEALTH CARE EDUCATION/TRAINING PROGRAM

## 2021-04-14 PROCEDURE — 84443 ASSAY THYROID STIM HORMONE: CPT | Performed by: STUDENT IN AN ORGANIZED HEALTH CARE EDUCATION/TRAINING PROGRAM

## 2021-04-14 PROCEDURE — 99213 OFFICE O/P EST LOW 20 MIN: CPT | Mod: 25 | Performed by: STUDENT IN AN ORGANIZED HEALTH CARE EDUCATION/TRAINING PROGRAM

## 2021-04-14 PROCEDURE — 80061 LIPID PANEL: CPT | Performed by: STUDENT IN AN ORGANIZED HEALTH CARE EDUCATION/TRAINING PROGRAM

## 2021-04-14 ASSESSMENT — MIFFLIN-ST. JEOR: SCORE: 1302.53

## 2021-04-14 NOTE — LETTER
April 19, 2021      Chrissy Aayush  [address] [facility] [place] [address]        Dear Chrissy,    Thank you for getting your care at [facility]. Please see below for your test results.    Resulted Orders   TSH   Result Value Ref Range    TSH 6.53 (H) 0.40 - 4.00 mU/L   Thyroid peroxidase antibody   Result Value Ref Range    Thyroid Peroxidase Antibody 2,991 (H) <35 IU/mL   Pap imaged thin layer screen with HPV - recommended age 30 - 65 years (select HPV order below)   Result Value Ref Range    PAP NIL     Copath Report         Acc#: [id number]   Signed: 4/19/2021 08:29   MR#: [id number]    SPECIMEN/STAIN PROCESS:  Pap imaged thin layer prep screening (Surepath, FocalPoint with guided   screening)       Pap-Cyto x 1, HPV ordered x 1    SOURCE: Cervical, endocervical  ----------------------------------------------------------------   Pap imaged thin layer prep screening (Surepath, FocalPoint with guided   screening)  SPECIMEN ADEQUACY:  Satisfactory for evaluation.  -Transitional zone component could not be determined due to atrophy.    CYTOLOGIC INTERPRETATION:    Negative for intraepithelial lesion or malignancy    Electronically signed by:  RICH Ceja (ASCP)    CLINICAL HISTORY:  LMP: 11/4/13  Post Menopausal, A previous normal pap  Date of Last Pap: 11/29/18,    Papanicolaou Test Limitations:  Cervical cytology is a screening test with   limited sensitivity; regular  screening is critical for cancer prevention; Pap tests are primarily   effective for the diagnosis/prevention of  squamous cell carcinom a, not adenocarcinomas or other cancers.    The technical component of this testing was completed at the [facility], with the professional component performed   at the [facility], [address] [place] [address],   [place] [address] ([phone])       Hemoglobin A1c   Result Value Ref  Range    Hemoglobin A1C 6.2 (H) 0 - 5.6 %      Comment:      Normal <5.7% Prediabetes 5.7-6.4%  Diabetes 6.5% or higher - adopted from ADA   consensus guidelines.     Lipid panel reflex to direct LDL Non-fasting   Result Value Ref Range    Cholesterol 131 <200 mg/dL    Triglycerides 141 <150 mg/dL    HDL Cholesterol 38 (L) >49 mg/dL    LDL Cholesterol Calculated 65 <100 mg/dL      Comment:      Desirable:       <100 mg/dl    Non HDL Cholesterol 93 <130 mg/dL     The cholesterol looks great!  The thyroid is a bit off- take the thyroid medicine first thing in AM on an empty stomach and wait 30 min before eating/drinking/taking other medicines.  The A1C is back in that prediabetes range, so try to get some physical activity.  The cervical cells on the pap are normal. The HPV virus test is still cooking and I'll call with those results.  If you have any concerns about these results please call and leave a message for me or send a MyChart message to the clinic.    Sincerely,    Kenroy Bautista, DO

## 2021-04-14 NOTE — PROGRESS NOTES
Female Physical Note          HPI         Concerns today: Getting her 2nd COVID vaccine soon  Not walking anymore because she doesn't feel safe around the neighborhood after all the social unrest from 2020 uprisings and police violence.  has been harassed by the police due to being . Knows those dyspnea/breathless episodes are due to anxiety.      Patient Active Problem List   Diagnosis     Essential hypertension     Glucose intolerance (impaired glucose tolerance)     Prediabetes     Low back pain     Chronic maxillary sinusitis     Screening for cervical cancer     Class 1 obesity due to excess calories with serious comorbidity and body mass index (BMI) of 31.0 to 31.9 in adult     Exertional dyspnea     COPD (chronic obstructive pulmonary disease) (H)     Sensorineural hearing loss, bilateral     Hashimoto's thyroiditis       Past Medical History:   Diagnosis Date     NO ACTIVE PROBLEMS        Previous Medical Care        Family History   Problem Relation Age of Onset     Cancer Mother      Hypertension Mother      Cerebrovascular Disease Mother      Diabetes Sister      Alcohol/Drug Sister      Alcohol/Drug Brother      Allergies Other      Alzheimer Disease Other         aunt     Thyroid Disease Other         daughter     Hypertension Father      Breast Cancer Niece      Myocardial Infarction No family hx of               Review of Systems:     Review of Systems:  CONSTITUTIONAL: NEGATIVE for fever, chills, change in weight  INTEGUMENTARY/SKIN: NEGATIVE for worrisome rashes, moles or lesions  EYES: NEGATIVE for vision changes or irritation  ENT/MOUTH: NEGATIVE for ear, mouth and throat problems  RESP: NEGATIVE for significant cough or SOB  BREAST: NEGATIVE for masses, tenderness or discharge  CV: NEGATIVE for chest pain, palpitations or peripheral edema  GI: NEGATIVE for nausea, abdominal pain, heartburn, or change in bowel habits  : NEGATIVE for frequency, dysuria, or  hematuria  MUSCULOSKELETAL: NEGATIVE for significant arthralgias or myalgia  NEURO: NEGATIVE for weakness, dizziness or paresthesias  ENDOCRINE: NEGATIVE for temperature intolerance, skin/hair changes  HEME/ALLERGY: NEGATIVE for bleeding problems  PSYCHIATRIC: POSITIVE for anxiety, negative for depression  Sleep:   Do you snore most or the night (as reported by a family member)? No  Do you feel sleepy or extremely tired during most of the day? No             Social History     Social History     Socioeconomic History     Marital status:      Spouse name: Not on file     Number of children: Not on file     Years of education: Not on file     Highest education level: Not on file   Occupational History     Not on file   Social Needs     Financial resource strain: Not on file     Food insecurity     Worry: Not on file     Inability: Not on file     Transportation needs     Medical: Not on file     Non-medical: Not on file   Tobacco Use     Smoking status: Former Smoker     Quit date: 2004     Years since quittin.0     Smokeless tobacco: Never Used     Tobacco comment: quit    Substance and Sexual Activity     Alcohol use: Yes     Comment: once  a month     Drug use: Never     Sexual activity: Yes     Partners: Male     Birth control/protection: None   Lifestyle     Physical activity     Days per week: Not on file     Minutes per session: Not on file     Stress: Not on file   Relationships     Social connections     Talks on phone: Not on file     Gets together: Not on file     Attends Advent service: Not on file     Active member of club or organization: Not on file     Attends meetings of clubs or organizations: Not on file     Relationship status: Not on file     Intimate partner violence     Fear of current or ex partner: Not on file     Emotionally abused: Not on file     Physically abused: Not on file     Forced sexual activity: Not on file   Other Topics Concern     Parent/sibling w/ CABG,  "MI or angioplasty before 65F 55M? Not Asked   Social History Narrative     Not on file       Marital Status:  Who lives in your household? , grandkids visit sometimes    Has anyone hurt you physically, for example by pushing, hitting, slapping or kicking you or forcing you to have sex? Denies  Do you feel threatened or controlled by a partner, ex-partner or anyone in your life? Denies    Sexual Health     Sexual concerns: No   STI History: Neg  Pregnancy History: No obstetric history on file.  LMP Patient's last menstrual period was 11/04/2013 (lmp unknown). Menopausal for 8 years  Last Pap Smear Date:   Lab Results   Component Value Date    PAP NIL 11/29/2018    PAP NIL 01/13/2014    PAP ASC-US 01/24/2013     Abnormal Pap History: see above and problem list for details. Had +HR HPV 2 years ago.    Recommended Screening     Cholesterol Level (>46 yo or at risk):  Recommended and patient accepted testing. and Pap/HPV cotest every 5 years for women 30-65   Recommended and patient accepted testing.  Colon CA Screening (>50-75 ):  Up to date for testing  Breast CA Screening (>41 yo or 10 y before 1st degree relative diagnosis): Recommended and patient accepted testing.           Physical Exam:     Vitals: /89   Pulse 77   Temp 97.6  F (36.4  C) (Oral)   Ht 1.575 m (5' 2\")   Wt 77.9 kg (171 lb 12.8 oz)   LMP 11/04/2013 (LMP Unknown)   SpO2 99%   BMI 31.42 kg/m    BMI= Body mass index is 31.42 kg/m .   GENERAL: healthy, alert and no distress  EYES: Eyes grossly normal to inspection, extraocular movements - intact, and PERRL  HENT: ear canals- normal; TMs- normal; Nose- normal; Mouth- no ulcers, no lesions  NECK: no tenderness, no adenopathy, no asymmetry, no masses, no stiffness; thyroid- normal to palpation  RESP: lungs clear to auscultation - no rales, no rhonchi, no wheezes  CV: regular rates and rhythm, normal S1 S2, no S3 or S4 and no murmur, no click or rub -  ABDOMEN: soft, no tenderness, " no  hepatosplenomegaly, no masses, normal bowel sounds  MS: extremities- no gross deformities noted, no edema  SKIN: no suspicious lesions, no rashes  NEURO: strength and tone- normal, sensory exam- grossly normal, mentation- intact, speech- normal, reflexes- symmetric  BACK: no CVA tenderness, no paralumbar tenderness  - female: postmenopausal vulvovaginal atrophy present, normal atrophied cervix, adnexae, and uterus without masses or discharge  PSYCH: Alert and oriented times 3; speech- coherent , normal rate and volume; able to articulate logical thoughts, able to abstract reason, no tangential thoughts, no hallucinations or delusions, affect- normal  LYMPHATICS: ant. cervical- normal, post. cervical- normal, axillary- normal, supraclavicular- normal, inguinal- normal      Assessment and Plan      Chrissy was seen today for physical.    Diagnoses and all orders for this visit:    Routine general medical examination at a health care facility  Class 1 obesity due to excess calories with serious comorbidity and body mass index (BMI) of 31.0 to 31.9 in adult  Mammo, lipids, A1C today. Decreased physical activity due to social unrest and anxiety. Prioritized supportive listening about anxiety over problem solving for physical activity per patient focus.  -     Screening Mammogram Digital Bilateral; Future  -     Hemoglobin A1c  -     Lipid panel reflex to direct LDL Non-fasting  -     Screening Mammogram Digital Bilateral; Future    High risk HPV infection  No estrogen pre-treatment for today's exam. Results by phone.  -     Pap imaged thin layer screen with HPV - recommended age 30 - 65 years (select HPV order below)  -     HPV High Risk Types DNA Cervical    Hypothyroidism, unspecified type  Monitor response to 100 mcg levothyroxine daily  TPO-Ab to confirm dx of Hashimoto's  -     TSH  -     Thyroid peroxidase antibody      Essential hypertension  At goal < 140/90  -     Cancel: Lipid Cascade  (Demario)    Adjustment disorder with anxious mood  Dyspnea episodes were due to anxiety and untreated hypothyroidism, not an acute MI. Discontinue nitroglycerin, metoprolol and aspirin per patient preference. Offered prn non-habit forming anxiolytic and/or BH referral, pt declined. Knows we have resources if she would like them. Theraputic listening today.      1. Health Care Maintenance: Normal Physical Exam      1. Mammogram ordered. and Routine follow up in one year.  2.Contraception: not needed    Options for treatment and follow-up care were reviewed with the patient . Chrissy Looney and/or guardian engaged in the decision making process and verbalized understanding of the options discussed and agreed with the final plan.      DO [facility] Resident PGY-3

## 2021-04-14 NOTE — PROGRESS NOTES
Preceptor Attestation:  Patient seen and evaluated in person. I discussed the patient with the resident. I have verified the content of the note, which accurately reflects my assessment of the patient and the plan of care.   Supervising Physician:  Shirley Lombardi DO

## 2021-04-14 NOTE — PATIENT INSTRUCTIONS
Let me know if you'd like an as needed anxiety medicine.   Stop aspirin and metoprolol  Blood work today - letter with normal results, phone call with abnormals.    Preventive Health Recommendations  Female Ages 50 - 64    Yearly exam: See your health care provider every year in order to  o Review health changes.   o Discuss preventive care.    o Review your medicines if your doctor has prescribed any.      Get a Pap test every three years (unless you have an abnormal result and your provider advises testing more often).    If you get Pap tests with HPV test, you only need to test every 5 years, unless you have an abnormal result.     You do not need a Pap test if your uterus was removed (hysterectomy) and you have not had cancer.    You should be tested each year for STDs (sexually transmitted diseases) if you're at risk.     Have a mammogram every 1 to 2 years.    Have a colonoscopy at age 50, or have a yearly FIT test (stool test). These exams screen for colon cancer.      Have a cholesterol test every 5 years, or more often if advised.    Have a diabetes test (fasting glucose) every three years. If you are at risk for diabetes, you should have this test more often.     If you are at risk for osteoporosis (brittle bone disease), think about having a bone density scan (DEXA).    Shots: Get a flu shot each year. Get a tetanus shot every 10 years.    Nutrition:     Eat at least 5 servings of fruits and vegetables each day.    Eat whole-grain bread, whole-wheat pasta and brown rice instead of white grains and rice.    Get adequate Calcium and Vitamin D.     Lifestyle    Exercise at least 150 minutes a week (30 minutes a day, 5 days a week). This will help you control your weight and prevent disease.    Limit alcohol to one drink per day.    No smoking.     Wear sunscreen to prevent skin cancer.     See your dentist every six months for an exam and cleaning.    See your eye doctor every 1 to 2 years.    Preventive  Health Recommendations  Female Ages 50 - 64    Yearly exam: See your health care provider every year in order to  o Review health changes.   o Discuss preventive care.    o Review your medicines if your doctor has prescribed any.      Get a Pap test every three years (unless you have an abnormal result and your provider advises testing more often).    If you get Pap tests with HPV test, you only need to test every 5 years, unless you have an abnormal result.     You do not need a Pap test if your uterus was removed (hysterectomy) and you have not had cancer.    You should be tested each year for STDs (sexually transmitted diseases) if you're at risk.     Have a mammogram every 1 to 2 years.    Have a colonoscopy at age 50, or have a yearly FIT test (stool test). These exams screen for colon cancer.      Have a cholesterol test every 5 years, or more often if advised.    Have a diabetes test (fasting glucose) every three years. If you are at risk for diabetes, you should have this test more often.     If you are at risk for osteoporosis (brittle bone disease), think about having a bone density scan (DEXA).    Shots: Get a flu shot each year. Get a tetanus shot every 10 years.    Nutrition:     Eat at least 5 servings of fruits and vegetables each day.    Eat whole-grain bread, whole-wheat pasta and brown rice instead of white grains and rice.    Get adequate Calcium and Vitamin D.     Lifestyle    Exercise at least 150 minutes a week (30 minutes a day, 5 days a week). This will help you control your weight and prevent disease.    Limit alcohol to one drink per day.    No smoking.     Wear sunscreen to prevent skin cancer.     See your dentist every six months for an exam and cleaning.    See your eye doctor every 1 to 2 years.    If unable to schedule an appointment with your doctor, schedule with someone else on their clinic team.

## 2021-04-14 NOTE — PROGRESS NOTES
"   SUBJECTIVE:   CC: Chrissy Looney is an 61 year old woman who presents for preventive health visit.     {Split Bill scripting  The purpose of this visit is to discuss your medical history and prevent health problems before you are sick. You may be responsible for a co-pay, coinsurance, or deductible if your visit today includes services such as checking on a sore throat, having an x-ray or lab test, or treating and evaluating a new or existing condition :113348}  Patient has been advised of split billing requirements and indicates understanding: {Yes and No:861052}  Healthy Habits:    Do you get at least three servings of calcium containing foods daily (dairy, green leafy vegetables, etc.)? { :817294::\"yes\"}    Amount of exercise or daily activities, outside of work: { :982166}    Problems taking medications regularly { :750944::\"No\"}    Medication side effects: { :688612::\"No\"}    Have you had an eye exam in the past two years? { :070045}    Do you see a dentist twice per year? { :895195}    Do you have sleep apnea, excessive snoring or daytime drowsiness?{ :873654}  {Outside tests to abstract? :647885}    {additional problems to add (Optional):471747}    Today's PHQ-2 Score:   PHQ-2 (  [employer]) 2021   Q1: Little interest or pleasure in doing things 0 0   Q2: Feeling down, depressed or hopeless 0 0   PHQ-2 Score 0 0     {PHQ-2 LOOK IN ASSESSMENTS (Optional) :180676}  Abuse: Current or Past(Physical, Sexual or Emotional)- {YES/NO/NA:102416}  Do you feel safe in your environment? {YES/NO/NA:923863}    Have you ever done Advance Care Planning? (For example, a Health Directive, POLST, or a discussion with a medical provider or your loved ones about your wishes): { :812821}    Social History     Tobacco Use     Smoking status: Former Smoker     Quit date: 2004     Years since quittin.0     Smokeless tobacco: Never Used     Tobacco comment: quit    Substance Use Topics     Alcohol use: Yes " "    Comment: once  a month     If you drink alcohol do you typically have >3 drinks per day or >7 drinks per week? {ETOH :697989}                     Reviewed orders with patient.  Reviewed health maintenance and updated orders accordingly - {Yes/No:523996::\"Yes\"}  {Chronicprobdata (Optional):836768}    FSH-7: No flowsheet data found.  {If any of the questions to the BCRA (FHS-7) are answered yes, consider ordering referral for genetic counseling (Optional) :798938::\"click delete button to remove this line now\"}  {AMB Mammogram Decision Support (Optional) :022591}  Pertinent mammograms are reviewed under the imaging tab.    Pertinent mammograms are reviewed under the imaging tab.  History of abnormal Pap smear: {PAP HX:865304}  PAP / HPV Latest Ref Rng & Units 11/29/2018 1/13/2014 1/24/2013   PAP - NIL NIL ASC-US(A)   HPV 16 DNA NEG:Negative Negative - -   HPV 18 DNA NEG:Negative Negative - -   OTHER HR HPV NEG:Negative Positive(A) - -   HPVSUR RESULT - - Negative  Reference range: Negative  (Note)  INTERPRETIVE INFORMATION: HPV High Risk, Hybrid Capture,  SurePath    The performance characteristics of HPV testing using the  SurePath sample medium were determined by [facility]  in a validation study. The FDA has not approved the  SurePath sample medium for HPV testing. Specimens collected  in SurePath sample medium may produce false-negative  results under certain conditions, e.g., when specimens  exceed stability requirements. For HPV results using an  FDA-approved test, laboratories should collect and  transport specimens according to the instructions of  FDA-approved kits (e.g., ThinPrep medium or HPV Digene  collection kit).    This test detects the high-risk HPV genotypes 16, 18, 31,  33, 35, 39, 45, 51, 52, 56, 58, 59, and 68 associated with  cervical cancer and its precursor lesions. However,  cross-reactions with other genotypes may occur. Results  should be correlated with cytologic and " histologic  findings. Sensitivity may be affected by specimen  cellularity.    This test is intended for medical purposes only and is not  valid for the evaluation of suspected sexual abuse or for  other forensic purposes.    HPV testing should not be used for screening or management  of atypical squamous cells of undetermined significance  (ASCUS) in women under age 21.    Test developed and characteristics determined by [facility]. See Compliance Statement B: [facility]  Performed by [facility],  [address] [place] [address] [phone]  [email or web address], Chandler Guerra MD, Lab. Director Negative  Reference range: Negative  (Note)  INTERPRETIVE INFORMATION: HPV DNA Probe, High Risk, SurePath    The performance characteristics of HPV testing using the  SurePath sample medium were determined by [facility]  in a validation study. The FDA has not approved the  SurePath sample medium for HPV testing. Specimens collected  in SurePath sample medium may produce false-negative  results under certain conditions, e.g., when specimens  exceed stability requirements. For HPV results using an  FDA-approved test, laboratories should collect and  transport specimens according to the instructions of  FDA-approved kits (e.g., ThinPrep medium or HPV Digene  collection kit).    This test detects the high-risk HPV genotypes 16, 18, 31,  33, 35, 39, 45, 51, 52, 56, 58, 59, and 68 associated with  cervical cancer and its precursor lesions. However,  cross-reactions with other genotypes may occur. Results  should be correlated with cytologic and histologic  findings. Sensitivity may be affected by cellularity of  specimen.    This test is intended for medical purposes only and is not  valid for the evaluation of suspected sexual abuse or for  other forensic purposes.    HPV testing should not be used for screening or management  of atypical squamous cells of undetermined significance  (ASCUS) in women under  "age 21.    This test was developed and its performance characteristics  determined by [facility]. The U.S. Food and Drug  Administration has not approved or cleared this test;  however, FDA clearance or approval is not currently  required for clinical use. The results are not intended to  be used as the sole means for clinical diagnosis or patient  management decisions.  Performed by [facility],  [address], [facility] [place] [place] [address] [phone]  [email or web address], Dalila Godoy MD, Lab. Director     Reviewed and updated as needed this visit by clinical staff  Tobacco  Allergies  Meds   Med Hx  Surg Hx  Fam Hx  Soc Hx        Reviewed and updated as needed this visit by Provider                {HISTORY OPTIONS (Optional):200965}    ROS:  { :207845}    OBJECTIVE:   /89   Pulse 77   Temp 97.6  F (36.4  C) (Oral)   Ht 1.575 m (5' 2\")   Wt 77.9 kg (171 lb 12.8 oz)   LMP 11/04/2013 (LMP Unknown)   SpO2 99%   BMI 31.42 kg/m    EXAM:  {Exam Choices:032345}    {Diagnostic Test Results (Optional):966381::\"Diagnostic Test Results:\",\"Labs reviewed in Epic\"}    ASSESSMENT/PLAN:   {Diag Picklist:442752}    Patient has been advised of split billing requirements and indicates understanding: {YES / NO:684508::\"Yes\"}  COUNSELING:   {FEMALE COUNSELING MESSAGES:327728::\"Reviewed preventive health counseling, as reflected in patient instructions\"}    Estimated body mass index is 31.42 kg/m  as calculated from the following:    Height as of this encounter: 1.575 m (5' 2\").    Weight as of this encounter: 77.9 kg (171 lb 12.8 oz).    {Weight Management Plan (ACO) Complete if BMI is abnormal-  Ages 18-64  BMI >24.9.  Age 65+ with BMI <23 or >30 (Optional):819723}    She reports that she quit smoking about 17 years ago. She has never used smokeless tobacco.      Counseling Resources:  ATP IV Guidelines  Pooled Cohorts Equation Calculator  Breast Cancer Risk Calculator  BRCA-Related Cancer Risk Assessment: FHS-7 " Tool  FRAX Risk Assessment  ICSI Preventive Guidelines  Dietary Guidelines for Americans, 2010  USDA's MyPlate  ASA Prophylaxis  Lung CA Screening    Britney Bautista DO  [facility]

## 2021-04-15 LAB — THYROPEROXIDASE AB SERPL-ACNC: 2991 IU/ML

## 2021-04-17 PROBLEM — E06.3 HASHIMOTO'S THYROIDITIS: Status: ACTIVE | Noted: 2021-04-17

## 2021-04-19 LAB
COPATH REPORT: NORMAL
PAP: NORMAL

## 2021-04-20 ENCOUNTER — TELEPHONE (OUTPATIENT)
Dept: FAMILY MEDICINE | Facility: CLINIC | Age: 61
End: 2021-04-20

## 2021-04-20 DIAGNOSIS — Z12.4 SCREENING FOR CERVICAL CANCER: Primary | ICD-10-CM

## 2021-04-20 LAB
FINAL DIAGNOSIS: NORMAL
HPV HR 12 DNA CVX QL NAA+PROBE: NEGATIVE
HPV16 DNA SPEC QL NAA+PROBE: NEGATIVE
HPV18 DNA SPEC QL NAA+PROBE: NEGATIVE
SPECIMEN DESCRIPTION: NORMAL
SPECIMEN SOURCE CVX/VAG CYTO: NORMAL

## 2021-04-20 NOTE — LETTER
April 20, 2021    Chrissy Looney  [address] [facility] [address]    Dear ,  This letter is regarding your recent Pap smear (cervical cancer screening) and Human Papillomavirus (HPV) test.  We are happy to inform you that your Pap smear result is normal. Cervical cancer is closely linked with certain types of HPV. Your results showed no evidence of high-risk HPV.  We recommend you have your next PAP smear and HPV test in 1 year.  You will still need to return to the clinic every year for an annual exam and other preventive tests.  If you have additional questions regarding this result, please call our clinic at [phone].  Sincerely,    Kenroy Bautista, DO

## 2021-04-21 ENCOUNTER — IMMUNIZATION (OUTPATIENT)
Dept: NURSING | Facility: CLINIC | Age: 61
End: 2021-04-21
Attending: INTERNAL MEDICINE
Payer: COMMERCIAL

## 2021-04-21 PROCEDURE — 91300 PR COVID VAC PFIZER DIL RECON 30 MCG/0.3 ML IM: CPT

## 2021-04-21 PROCEDURE — 0002A PR COVID VAC PFIZER DIL RECON 30 MCG/0.3 ML IM: CPT

## 2021-04-21 NOTE — TELEPHONE ENCOUNTER
PCP: Brintey Bautista  61 year old    Pap on 4/14/21 Result: NIL  HPV negative  Pertinent History: Pap 11/29/18 NIL +HR HPV    Plan: Cotest (pap and HPV) in 12 months    Problem list (and overview) has been updated: Yes  Health care Maintenance has been updated: Yes    Patient contacted with results and plan via telephone- left voicemail and will also send letter.    DO [facility] Resident PGY-3

## 2021-05-10 DIAGNOSIS — J30.89 ENVIRONMENTAL AND SEASONAL ALLERGIES: ICD-10-CM

## 2021-05-10 DIAGNOSIS — I10 BENIGN ESSENTIAL HYPERTENSION: ICD-10-CM

## 2021-05-10 NOTE — TELEPHONE ENCOUNTER
zestoretic 20.12.5mg    LAST FILLED DATE: 04-  LAST FILLED STRENGTH: 20-12.5 MG  LAST FILLED QTY: 30  LAST OFFICE VISIT: 04-    Zyrtec 10mg    LAST FILLED DATE: 02-  LAST FILLED STRENGTH: 10 MG  LAST FILLED QTY: 90  LAST OFFICE VISIT: 04-    Nola CRUZ CPhT @    [facility]  [address] [place] [address]  Phone: [phone]  Fax: [phone]

## 2021-05-11 RX ORDER — CETIRIZINE HYDROCHLORIDE 10 MG/1
10 TABLET ORAL DAILY
Qty: 90 TABLET | Refills: 3 | Status: SHIPPED | OUTPATIENT
Start: 2021-05-11 | End: 2022-03-02

## 2021-05-11 RX ORDER — LISINOPRIL AND HYDROCHLOROTHIAZIDE 12.5; 2 MG/1; MG/1
1 TABLET ORAL DAILY
Qty: 90 TABLET | Refills: 3 | Status: SHIPPED | OUTPATIENT
Start: 2021-05-11 | End: 2022-05-05

## 2021-05-11 NOTE — TELEPHONE ENCOUNTER

## 2021-06-28 DIAGNOSIS — R06.09 EXERTIONAL DYSPNEA: ICD-10-CM

## 2021-06-28 RX ORDER — ATORVASTATIN CALCIUM 40 MG/1
40 TABLET, FILM COATED ORAL DAILY
Qty: 90 TABLET | Refills: 3 | Status: SHIPPED | OUTPATIENT
Start: 2021-06-28 | End: 2022-07-02

## 2021-06-28 NOTE — TELEPHONE ENCOUNTER
"Request for medication refill:    Atorvastatin 40 mg     Providers if patient needs an appointment and you are willing to give a one month supply please refill for one month and  send a letter/MyChart using \".SMILLIMITEDREFILL\" .smillimited and route chart to \"P SMI \" (Giving one month refill in non controlled medications is strongly recommended before denial)    If refill has been denied, meaning absolutely no refills without visit, please complete the smart phrase \".smirxrefuse\" and route it to the \"P SMI MED REFILLS\"  pool to inform the patient and the pharmacy.    Brunilda Marino, CMA        "

## 2021-10-05 ENCOUNTER — TELEPHONE (OUTPATIENT)
Dept: FAMILY MEDICINE | Facility: CLINIC | Age: 61
End: 2021-10-05

## 2021-10-05 DIAGNOSIS — Z00.00 PREVENTATIVE HEALTH CARE: Primary | ICD-10-CM

## 2021-10-05 DIAGNOSIS — E03.9 HYPOTHYROIDISM, UNSPECIFIED TYPE: ICD-10-CM

## 2021-10-05 NOTE — TELEPHONE ENCOUNTER
[facility] phone call message - order or referral request for patient:     Order or referral being requested: Patient needs to have TSH labs prior to prescription refill. Please place lab order for TSH.      Additional Comments: Patient would like a call back when the orders are placed    OK to leave a message on voice mail? Voicemail does not work properly so do not leave a message    Primary language: English      needed? No    Call taken on October 5, 2021 at 9:26 AM by Velia Gregorio CMA    ---Orders entered - Also placed CBC and BMP (no preventative lab work in over a year)  Kim Acosta, DO  Pronouns: she/her/hers  Resident Physician  [facility]/ [facility]    Department of Family Medicine and Community Health

## 2021-10-12 ENCOUNTER — LAB (OUTPATIENT)
Dept: LAB | Facility: CLINIC | Age: 61
End: 2021-10-12
Payer: COMMERCIAL

## 2021-10-12 DIAGNOSIS — E03.9 HYPOTHYROIDISM, UNSPECIFIED TYPE: ICD-10-CM

## 2021-10-12 DIAGNOSIS — Z00.00 PREVENTATIVE HEALTH CARE: ICD-10-CM

## 2021-10-12 LAB
ANION GAP SERPL CALCULATED.3IONS-SCNC: 6 MMOL/L (ref 3–14)
BASOPHILS # BLD AUTO: 0.1 10E3/UL (ref 0–0.2)
BASOPHILS NFR BLD AUTO: 1 %
BUN SERPL-MCNC: 19 MG/DL (ref 7–30)
CALCIUM SERPL-MCNC: 9.7 MG/DL (ref 8.5–10.1)
CHLORIDE BLD-SCNC: 102 MMOL/L (ref 94–109)
CO2 SERPL-SCNC: 26 MMOL/L (ref 20–32)
CREAT SERPL-MCNC: 0.78 MG/DL (ref 0.52–1.04)
EOSINOPHIL # BLD AUTO: 0.3 10E3/UL (ref 0–0.7)
EOSINOPHIL NFR BLD AUTO: 4 %
ERYTHROCYTE [DISTWIDTH] IN BLOOD BY AUTOMATED COUNT: 13.1 % (ref 10–15)
ERYTHROCYTE [DISTWIDTH] IN BLOOD BY AUTOMATED COUNT: 13.6 % (ref 10–15)
GFR SERPL CREATININE-BSD FRML MDRD: 83 ML/MIN/1.73M2
GLUCOSE BLD-MCNC: 96 MG/DL (ref 70–99)
HCT VFR BLD AUTO: 47.2 %
HCT VFR BLD AUTO: 48.7 % (ref 35–47)
HGB BLD-MCNC: 15.4 G/DL (ref 11.7–15.7)
HGB BLD-MCNC: 15.8 G/DL (ref 11.7–15.7)
IMM GRANULOCYTES # BLD: 0 10E3/UL
IMM GRANULOCYTES NFR BLD: 0 %
LYMPHOCYTES # BLD AUTO: 2.6 10E3/UL (ref 0.8–5.3)
LYMPHOCYTES NFR BLD AUTO: 38 %
MCH RBC QN AUTO: 28.7 PG (ref 26.5–33)
MCH RBC QN AUTO: 29.2 PG (ref 26.5–33)
MCHC RBC AUTO-ENTMCNC: 32.4 G/DL (ref 31.5–36.5)
MCHC RBC AUTO-ENTMCNC: 32.6 G/DL (ref 31.5–36.5)
MCV RBC AUTO: 88 FL (ref 78–100)
MCV RBC AUTO: 90 FL (ref 78–100)
MONOCYTES # BLD AUTO: 0.8 10E3/UL (ref 0–1.3)
MONOCYTES NFR BLD AUTO: 12 %
NEUTROPHILS # BLD AUTO: 3.1 10E3/UL (ref 1.6–8.3)
NEUTROPHILS NFR BLD AUTO: 45 %
NRBC # BLD AUTO: 0 10E3/UL
NRBC BLD AUTO-RTO: 0 /100
PLATELET # BLD AUTO: 278 10E3/UL (ref 150–450)
PLATELET # BLD AUTO: 323 10E3/UL (ref 150–450)
POTASSIUM BLD-SCNC: 4.5 MMOL/L (ref 3.4–5.3)
RBC # BLD AUTO: 5.37 10E6/UL (ref 3.8–5.2)
RBC # BLD AUTO: 5.41 10E6/UL (ref 3.8–5.2)
SODIUM SERPL-SCNC: 134 MMOL/L (ref 133–144)
T4 FREE SERPL-MCNC: 1.19 NG/DL (ref 0.76–1.46)
TSH SERPL DL<=0.005 MIU/L-ACNC: 7.63 MU/L (ref 0.4–4)
WBC # BLD AUTO: 6.7 10E3/UL (ref 4–11)
WBC # BLD AUTO: 6.9 10E3/UL (ref 4–11)

## 2021-10-12 PROCEDURE — 85025 COMPLETE CBC W/AUTO DIFF WBC: CPT

## 2021-10-12 PROCEDURE — 84439 ASSAY OF FREE THYROXINE: CPT

## 2021-10-12 PROCEDURE — 36415 COLL VENOUS BLD VENIPUNCTURE: CPT

## 2021-10-12 PROCEDURE — 80048 BASIC METABOLIC PNL TOTAL CA: CPT

## 2021-10-12 PROCEDURE — 84443 ASSAY THYROID STIM HORMONE: CPT

## 2021-10-12 PROCEDURE — 85027 COMPLETE CBC AUTOMATED: CPT

## 2021-12-19 NOTE — PROGRESS NOTES
"  Assessment & Plan     Hashimoto's thyroiditis  Patient with elevated TSH with normal T4 at lab draw, here for follow-up after medication dosage adjustment to 112 of levothyroxine daily.  TSH and T4 drawn today, but not yet resulted.  Denies changes in symptoms, does admit to lower activity level but attributes this to time of year.  Counseling done on when to take levothyroxine, an hour before breakfast.  Communicated that if TSH is still elevated we may adjust her medication dose again, but if it is last we will send her a letter.  Either way we will provide refills.  Follow-up 3 months if medication adjusted otherwise as needed.  - TSH with free T4 reflex    Screening for colorectal cancer  [facility]  Patient prefers FIT screening over colonoscopy. Has had bad experience with colonoscopy in the past.  Education done on the frequency of the testing and the possibility of needing a colonoscopy after a positive fit test.  Patient picked up today.  - Fecal colorectal cancer screen FITT            Thyroid peroxidase: 2,991 (high, normal <35) Confirms Hashimoto's    T4 free: 1.19 10/12/21 (normal range)  TSH   Date Value Ref Range Status   10/12/2021 7.63 (H) 0.40 - 4.00 mU/L Final   04/14/2021 6.53 (H) 0.40 - 4.00 mU/L Final   08/03/2020 0.73 0.40 - 4.00 mU/L Final   06/09/2020 11.30 (H) 0.40 - 4.00 mU/L Final   05/11/2020 22.80 (H) 0.40 - 4.00 mU/L Final   04/01/2020 67.23 (H) 0.40 - 4.00 mU/L Final     Current Outpatient Medications   Medication     acetaminophen (TYLENOL) 500 MG tablet     atorvastatin (LIPITOR) 40 MG tablet     cetirizine (ZYRTEC) 10 MG tablet     levothyroxine (SYNTHROID/LEVOTHROID) 112 MCG tablet     lisinopril-hydrochlorothiazide (ZESTORETIC) 20-12.5 MG tablet     No current facility-administered medications for this visit.         6}     BMI:   Estimated body mass index is 29.63 kg/m  as calculated from the following:    Height as of this encounter: 1.57 m (5' 1.81\").    Weight " "as of this encounter: 73 kg (161 lb).   Not discussed at this visit, but increases goal activity recommended.      Return in about 3 months (around 3/22/2022) for Follow up thyroid if needed.    Kim Acosta DO  [facility] JESSICA Lowe is a 61 year old who presents for the following health issues     HPI     Hypothyroidism    Taking levothyroxine every day? - Yes  When are you taking your levothyroxine? - 5/6am, breakfast half an hour after.   With or without food? Without  Taking any medications/supplements not on your medication list? Vitamin D3  New/worsening symptoms? Weight gain, cold intolerance, depression? No significant weight loss/gain, cold sensitivity, No change in mood.   Less exercise lately. Walking outside is scary for her, and  won't go with her for indoor walking.         Review of Systems   Constitutional, HEENT, cardiovascular, pulmonary, gi and gu systems are negative, except as otherwise noted.      Objective    BP (!) 156/92   Pulse 95   Temp 97.9  F (36.6  C) (Oral)   Resp 16   Ht 1.57 m (5' 1.81\")   Wt 73 kg (161 lb)   LMP 11/04/2013 (LMP Unknown)   SpO2 97%   BMI 29.63 kg/m    Body mass index is 29.63 kg/m .  Physical Exam   GENERAL: healthy, alert and no distress  NECK: no adenopathy, no asymmetry, masses, or scars and thyroid normal to palpation  RESP: lungs clear to auscultation - no rales, rhonchi or wheezes  CV: regular rate and rhythm, normal S1 S2, no S3 or S4, no murmur, click or rub, no peripheral edema and peripheral pulses strong  MS: no gross musculoskeletal defects noted, no edema  PSYCH: Normal affect, bright      "

## 2021-12-22 ENCOUNTER — OFFICE VISIT (OUTPATIENT)
Dept: FAMILY MEDICINE | Facility: CLINIC | Age: 61
End: 2021-12-22
Payer: COMMERCIAL

## 2021-12-22 VITALS
DIASTOLIC BLOOD PRESSURE: 92 MMHG | TEMPERATURE: 97.9 F | WEIGHT: 161 LBS | BODY MASS INDEX: 29.63 KG/M2 | HEIGHT: 62 IN | HEART RATE: 95 BPM | SYSTOLIC BLOOD PRESSURE: 156 MMHG | RESPIRATION RATE: 16 BRPM | OXYGEN SATURATION: 97 %

## 2021-12-22 DIAGNOSIS — Z23 ENCOUNTER FOR ADMINISTRATION OF COVID-19 VACCINE: ICD-10-CM

## 2021-12-22 DIAGNOSIS — Z00.00 PREVENTATIVE HEALTH CARE: ICD-10-CM

## 2021-12-22 DIAGNOSIS — Z12.12 SCREENING FOR COLORECTAL CANCER: ICD-10-CM

## 2021-12-22 DIAGNOSIS — Z12.11 SCREENING FOR COLORECTAL CANCER: ICD-10-CM

## 2021-12-22 DIAGNOSIS — E06.3 HASHIMOTO'S THYROIDITIS: Primary | ICD-10-CM

## 2021-12-22 PROBLEM — J44.9 COPD (CHRONIC OBSTRUCTIVE PULMONARY DISEASE) (H): Status: RESOLVED | Noted: 2020-11-23 | Resolved: 2021-12-22

## 2021-12-22 LAB — TSH SERPL DL<=0.005 MIU/L-ACNC: 2.84 MU/L (ref 0.4–4)

## 2021-12-22 PROCEDURE — 99214 OFFICE O/P EST MOD 30 MIN: CPT | Mod: GC | Performed by: STUDENT IN AN ORGANIZED HEALTH CARE EDUCATION/TRAINING PROGRAM

## 2021-12-22 PROCEDURE — 84443 ASSAY THYROID STIM HORMONE: CPT | Performed by: STUDENT IN AN ORGANIZED HEALTH CARE EDUCATION/TRAINING PROGRAM

## 2021-12-22 PROCEDURE — 36415 COLL VENOUS BLD VENIPUNCTURE: CPT | Performed by: STUDENT IN AN ORGANIZED HEALTH CARE EDUCATION/TRAINING PROGRAM

## 2021-12-22 ASSESSMENT — MIFFLIN-ST. JEOR: SCORE: 1250.54

## 2021-12-22 NOTE — LETTER
December 23, 2021      Chrissy Looney  [address] [facility] [address]        Dear ,    We are writing to inform you of your test results.    Your test results fall within the expected range(s) or remain unchanged from previous results.  Please continue with current treatment plan. Awe will not make any changes to your thyroid medication. Looks like we have you on the correct dosage now. Have a great holiday season!    Resulted Orders   TSH with free T4 reflex   Result Value Ref Range    TSH 2.84 0.40 - 4.00 mU/L       If you have any questions or concerns, please call the clinic at the number listed above.       Sincerely,      Kirill Anderson MD

## 2021-12-22 NOTE — PROGRESS NOTES
Preceptor Attestation:   Patient seen, evaluated and discussed with the resident. I have verified the content of the note, which accurately reflects my assessment of the patient and the plan of care.   Supervising Physician:  Kirill Anderson MD

## 2021-12-22 NOTE — PATIENT INSTRUCTIONS
Patient Education   Here is the plan from today's visit    It was great to see you today!    1. Hashimoto's thyroiditis  Will contact you with the results of your labs, and any medication adjustments that need to be made. Glad you are doing so well!   - TSH with free T4 reflex; Future      2. Screening for colorectal cancer   kit on the way out today, return to lab!  - Fecal colorectal cancer screen FITT; Future      Please call or return to clinic if your symptoms don't go away.    Follow up plan  No follow-ups on file.    Thank you for coming to [facility] today.  Lab Testing:  **If you had lab testing today and your results are reassuring or normal they will be mailed to you or sent through YouTab within 7 days.   **If the lab tests need quick action we will call you with the results.  **If you are having labs done on a different day, please call [phone] to schedule at [facility] or [phone] for other [facility] Outpatient Lab locations. Labs do not offer walk-in appointments.  The phone number we will call with results is # [phone] (home) . If this is not the best number please call our clinic and change the number.  Medication Refills:  If you need any refills please call your pharmacy and they will contact us.   If you need to  your refill at a new pharmacy, please contact the new pharmacy directly. The new pharmacy will help you get your medications transferred faster.   Scheduling:  If you have any concerns about today's visit or wish to schedule another appointment please call our office during normal business hours [phone] (8-5:00 M-F)  If a referral was made to an [facility] specialty provider and you do not get a call from central scheduling, please refer to directions on your visit summary or call our office during normal business hours for assistance.   If a Mammogram was ordered for you at the Breast Center call [phone] to schedule or change  your appointment.  If you had an XRay/CT/Ultrasound/MRI ordered the number is [phone] to schedule or change your radiology appointment.   [facility] has limited ultrasound appointments available on Wednesdays, if you would like your ultrasound at [facility], please call [phone] to schedule.   Medical Concerns:  If you have urgent medical concerns please call [phone] at any time of the day.    Kim Acosta, DO

## 2021-12-23 ENCOUNTER — LAB (OUTPATIENT)
Dept: LAB | Facility: CLINIC | Age: 61
End: 2021-12-23
Payer: COMMERCIAL

## 2021-12-23 DIAGNOSIS — Z12.11 SCREENING FOR COLORECTAL CANCER: ICD-10-CM

## 2021-12-23 DIAGNOSIS — Z12.12 SCREENING FOR COLORECTAL CANCER: ICD-10-CM

## 2021-12-23 PROCEDURE — 82274 ASSAY TEST FOR BLOOD FECAL: CPT

## 2021-12-25 LAB — HEMOCCULT STL QL IA: NEGATIVE

## 2022-01-03 ENCOUNTER — OFFICE VISIT (OUTPATIENT)
Dept: FAMILY MEDICINE | Facility: CLINIC | Age: 62
End: 2022-01-03
Payer: COMMERCIAL

## 2022-01-03 VITALS
TEMPERATURE: 98 F | OXYGEN SATURATION: 98 % | HEART RATE: 91 BPM | DIASTOLIC BLOOD PRESSURE: 104 MMHG | SYSTOLIC BLOOD PRESSURE: 154 MMHG

## 2022-01-03 DIAGNOSIS — Z20.822 SUSPECTED 2019 NOVEL CORONAVIRUS INFECTION: ICD-10-CM

## 2022-01-03 DIAGNOSIS — M79.10 MYALGIA: Primary | ICD-10-CM

## 2022-01-03 LAB
FLUAV AG SPEC QL IA: NEGATIVE
FLUBV AG SPEC QL IA: NEGATIVE
SARS-COV-2 RNA RESP QL NAA+PROBE: NORMAL

## 2022-01-03 PROCEDURE — U0003 INFECTIOUS AGENT DETECTION BY NUCLEIC ACID (DNA OR RNA); SEVERE ACUTE RESPIRATORY SYNDROME CORONAVIRUS 2 (SARS-COV-2) (CORONAVIRUS DISEASE [COVID-19]), AMPLIFIED PROBE TECHNIQUE, MAKING USE OF HIGH THROUGHPUT TECHNOLOGIES AS DESCRIBED BY CMS-2020-01-R: HCPCS | Mod: 90 | Performed by: STUDENT IN AN ORGANIZED HEALTH CARE EDUCATION/TRAINING PROGRAM

## 2022-01-03 PROCEDURE — U0005 INFEC AGEN DETEC AMPLI PROBE: HCPCS | Mod: 90 | Performed by: STUDENT IN AN ORGANIZED HEALTH CARE EDUCATION/TRAINING PROGRAM

## 2022-01-03 PROCEDURE — 99213 OFFICE O/P EST LOW 20 MIN: CPT | Mod: CS | Performed by: STUDENT IN AN ORGANIZED HEALTH CARE EDUCATION/TRAINING PROGRAM

## 2022-01-03 PROCEDURE — 87804 INFLUENZA ASSAY W/OPTIC: CPT | Performed by: STUDENT IN AN ORGANIZED HEALTH CARE EDUCATION/TRAINING PROGRAM

## 2022-01-03 PROCEDURE — 99000 SPECIMEN HANDLING OFFICE-LAB: CPT | Performed by: STUDENT IN AN ORGANIZED HEALTH CARE EDUCATION/TRAINING PROGRAM

## 2022-01-03 NOTE — PROGRESS NOTES
Assessment & Plan     Myalgia  Suspected 2019 novel coronavirus infection  VSS with no respiratory symptoms. Instructed to isolate until results. Vaccinated and boosted. TSH recently wnl so no indication to recheck for fatigue eval.   - Symptomatic; Yes COVID-19 Virus (Coronavirus) by PCR Nose  - Influenza A/B antigen    HTN   Pt reports always elevated in clinic and normal at home. No changes today.     Ordering of each unique test  10 minutes spent on the date of the encounter doing chart review, history and exam, documentation and further activities per the note      Return if symptoms worsen or fail to improve.    Shirley Lombardi DO  [facility] JESSICA Lowe is a 61 year old who presents for the following health issues      Chief Complaint   Patient presents with     Covid Concern     x 3 days, chills, fatigue, and bodyaches        HPI     Vaccinated and boosted     TERENCE sx started    Body aches, once in a while the chills, tired.   No cough, dyspena, n/v/diarrhea   No fever   Noticed tongue a little white today   No sore throat, no sinus sx     Review of Systems   Pertinent positives and negatives per HPI.        Objective    BP (!) 154/104   Pulse 91   Temp 98  F (36.7  C) (Oral)   LMP 11/04/2013 (LMP Unknown)   SpO2 98%   There is no height or weight on file to calculate BMI.  Physical Exam   GENERAL: healthy, alert and no distress  EYES: Eyes grossly normal to inspection, PERRL and conjunctivae and sclerae normal  HENT: nose and mouth without ulcers or lesions. Mildly erythematous posterior oropharynx.   NECK: no adenopathy, no asymmetry, masses, or scars and thyroid normal to palpation  RESP: lungs clear to auscultation - no rales, rhonchi or wheezes  CV: regular rate and rhythm, normal S1 S2, no S3 or S4, no murmur, click or rub, no peripheral edema and peripheral pulses strong  MS: no gross musculoskeletal defects noted, no edema  PSYCH: mentation appears normal,  affect normal/bright

## 2022-01-04 LAB — SARS-COV-2 RNA RESP QL NAA+PROBE: NOT DETECTED

## 2022-01-28 ENCOUNTER — TELEPHONE (OUTPATIENT)
Dept: OTOLARYNGOLOGY | Facility: CLINIC | Age: 62
End: 2022-01-28
Payer: COMMERCIAL

## 2022-02-04 DIAGNOSIS — H90.3 SENSORINEURAL HEARING LOSS (SNHL), BILATERAL: Primary | ICD-10-CM

## 2022-02-07 ENCOUNTER — TELEPHONE (OUTPATIENT)
Dept: AUDIOLOGY | Facility: CLINIC | Age: 62
End: 2022-02-07
Payer: COMMERCIAL

## 2022-02-07 NOTE — TELEPHONE ENCOUNTER
Walk-in hearing aid services on 2/7/22: The patient's left earmold tubing had detached from the earmold.  The earmold and hearing aid were cleaned today and the earmold tubing and hearing aid tonehook were replaced.  The hearing aid was found to be working normally and was returned to Chrissy, who reported a comfortable fit.  She was advised to return to the clinic as needed.

## 2022-02-08 ENCOUNTER — OFFICE VISIT (OUTPATIENT)
Dept: FAMILY MEDICINE | Facility: CLINIC | Age: 62
End: 2022-02-08
Payer: COMMERCIAL

## 2022-02-08 VITALS
RESPIRATION RATE: 16 BRPM | HEIGHT: 62 IN | OXYGEN SATURATION: 98 % | SYSTOLIC BLOOD PRESSURE: 153 MMHG | TEMPERATURE: 98 F | HEART RATE: 101 BPM | WEIGHT: 161.6 LBS | DIASTOLIC BLOOD PRESSURE: 82 MMHG | BODY MASS INDEX: 29.74 KG/M2

## 2022-02-08 DIAGNOSIS — E03.9 HYPOTHYROIDISM, UNSPECIFIED TYPE: ICD-10-CM

## 2022-02-08 DIAGNOSIS — I10 PRIMARY HYPERTENSION: ICD-10-CM

## 2022-02-08 DIAGNOSIS — K12.0 CANKER SORES ORAL: ICD-10-CM

## 2022-02-08 DIAGNOSIS — B37.0 THRUSH: Primary | ICD-10-CM

## 2022-02-08 DIAGNOSIS — D58.2 ELEVATED HEMOGLOBIN (H): ICD-10-CM

## 2022-02-08 LAB
ANION GAP SERPL CALCULATED.3IONS-SCNC: 10 MMOL/L (ref 3–14)
BUN SERPL-MCNC: 13 MG/DL (ref 7–30)
CALCIUM SERPL-MCNC: 9.8 MG/DL (ref 8.5–10.1)
CHLORIDE BLD-SCNC: 104 MMOL/L (ref 94–109)
CO2 SERPL-SCNC: 26 MMOL/L (ref 20–32)
CREAT SERPL-MCNC: 0.67 MG/DL (ref 0.52–1.04)
ERYTHROCYTE [DISTWIDTH] IN BLOOD BY AUTOMATED COUNT: 12.7 % (ref 10–15)
GFR SERPL CREATININE-BSD FRML MDRD: >90 ML/MIN/1.73M2
GLUCOSE BLD-MCNC: 103 MG/DL (ref 70–99)
HBA1C MFR BLD: 6.2 % (ref 0–5.6)
HCT VFR BLD AUTO: 45.6 %
HGB BLD-MCNC: 14.7 G/DL (ref 11.7–15.7)
MCH RBC QN AUTO: 28.4 PG (ref 26.5–33)
MCHC RBC AUTO-ENTMCNC: 32.2 G/DL (ref 31.5–36.5)
MCV RBC AUTO: 88 FL (ref 78–100)
PLATELET # BLD AUTO: 280 10E3/UL (ref 150–450)
POTASSIUM BLD-SCNC: 4.4 MMOL/L (ref 3.4–5.3)
RBC # BLD AUTO: 5.17 10E6/UL (ref 3.8–5.2)
SODIUM SERPL-SCNC: 140 MMOL/L (ref 133–144)
TSH SERPL DL<=0.005 MIU/L-ACNC: 1.79 MU/L (ref 0.4–4)
WBC # BLD AUTO: 7.7 10E3/UL (ref 4–11)

## 2022-02-08 PROCEDURE — 84443 ASSAY THYROID STIM HORMONE: CPT | Performed by: FAMILY MEDICINE

## 2022-02-08 PROCEDURE — 83036 HEMOGLOBIN GLYCOSYLATED A1C: CPT | Performed by: FAMILY MEDICINE

## 2022-02-08 PROCEDURE — 85027 COMPLETE CBC AUTOMATED: CPT | Performed by: FAMILY MEDICINE

## 2022-02-08 PROCEDURE — 36415 COLL VENOUS BLD VENIPUNCTURE: CPT | Performed by: FAMILY MEDICINE

## 2022-02-08 PROCEDURE — 99214 OFFICE O/P EST MOD 30 MIN: CPT | Performed by: FAMILY MEDICINE

## 2022-02-08 PROCEDURE — 80048 BASIC METABOLIC PNL TOTAL CA: CPT | Performed by: FAMILY MEDICINE

## 2022-02-08 RX ORDER — CLOBETASOL PROPIONATE 0.5 MG/G
OINTMENT TOPICAL 3 TIMES DAILY
Qty: 30 G | Refills: 0 | Status: SHIPPED | OUTPATIENT
Start: 2022-02-08

## 2022-02-08 RX ORDER — INFLUENZA A VIRUS A/HAWAII/70/2019 (H1N1) RECOMBINANT HEMAGGLUTININ ANTIGEN, INFLUENZA A VIRUS A/MINNESOTA/41/2019 (H3N2) RECOMBINANT HEMAGGLUTININ ANTIGEN, INFLUENZA B VIRUS B/WASHINGTON/02/2019 RECOMBINANT HEMAGGLUTININ ANTIGEN, AND INFLUENZA B VIRUS B/PHUKET/3073/2013 RECOMBINANT HEMAGGLUTININ ANTIGEN 45; 45; 45; 45 UG/.5ML; UG/.5ML; UG/.5ML; UG/.5ML
INJECTION INTRAMUSCULAR
COMMUNITY
Start: 2021-10-12

## 2022-02-08 RX ORDER — CLOTRIMAZOLE 10 MG/1
10 LOZENGE ORAL
Qty: 50 LOZENGE | Refills: 0 | Status: SHIPPED | OUTPATIENT
Start: 2022-02-08 | End: 2022-02-14

## 2022-02-08 ASSESSMENT — MIFFLIN-ST. JEOR: SCORE: 1248.88

## 2022-02-08 NOTE — PROGRESS NOTES
ASSESSMENT/PLAN:   There are no Patient Instructions on file for this visit.      ICD-10-CM    1. Thrush  B37.0 CBC with platelets     Basic metabolic panel     clotrimazole (MYCELEX) 10 MG lozenge     Hemoglobin A1c     CBC with platelets     Basic metabolic panel     Hemoglobin A1c   2. Canker sores oral  K12.0 CBC with platelets     clobetasol (TEMOVATE) 0.05 % external ointment     CBC with platelets   3. Hypothyroidism, unspecified type  E03.9 CBC with platelets     Basic metabolic panel     TSH with free T4 reflex     CBC with platelets     Basic metabolic panel     TSH with free T4 reflex   4. Primary hypertension  I10 Hemoglobin A1c     Hemoglobin A1c   5. Elevated hemoglobin (H)  D58.2      See discussions below.  >30 minutes spent with this patient and on documentation on the date of service.    SUBJECTIVE:   Chrissy Looney is a 61 year old female who presents to clinic today for the following health issues:  1. ?Thrush--noticed caking of her tongue.  Slight burning sensation.  Nothing on palate or cheeks.  No diabetes but prediabetic.  Does have hashimoto's thyroiditis but no know sjogrens.  Does notice some more try mouth.  No inhalers.  2. Canker sores.  Has a couple of these that are painful.  Would like to try something for these.  Has been using some mouthwash.  Again uncertain why she is getting more oral issues recently.  3. HTN--high today but sound like this is mostly in the clinic.  Gave her BP cuff to her daughter.  May get a new one.    4. Hypothyroidism.  Recent dose change (2 months) will recheck to make sure now at goal.  5. H/o of elev hgb--will recheck.      Problem list and histories reviewed & adjusted, as indicated.  Additional history: as documented    Patient Active Problem List   Diagnosis     Essential hypertension     Glucose intolerance (impaired glucose tolerance)     Prediabetes     Low back pain     Chronic maxillary sinusitis     Screening for cervical cancer     Class 1  obesity due to excess calories with serious comorbidity and body mass index (BMI) of 31.0 to 31.9 in adult     Exertional dyspnea     Sensorineural hearing loss, bilateral     Hashimoto's thyroiditis     Past Surgical History:   Procedure Laterality Date     NO HISTORY OF SURGERY         Social History     Tobacco Use     Smoking status: Former Smoker     Quit date: 2004     Years since quittin.8     Smokeless tobacco: Never Used     Tobacco comment: quit    Substance Use Topics     Alcohol use: Yes     Comment: once  a month     Family History   Problem Relation Age of Onset     Cancer Mother      Hypertension Mother      Cerebrovascular Disease Mother      Diabetes Sister      Alcohol/Drug Sister      Alcohol/Drug Brother      Allergies Other      Alzheimer Disease Other         aunt     Thyroid Disease Other         daughter     Hypertension Father      Breast Cancer Niece      Myocardial Infarction No family hx of          Current Outpatient Medications   Medication Sig Dispense Refill     acetaminophen (TYLENOL) 500 MG tablet Take 1-2 tablets (500-1,000 mg) by mouth every 6 hours as needed for mild pain 1 Bottle 11     atorvastatin (LIPITOR) 40 MG tablet Take 1 tablet (40 mg) by mouth daily 90 tablet 3     cetirizine (ZYRTEC) 10 MG tablet Take 1 tablet (10 mg) by mouth daily 90 tablet 3     clobetasol (TEMOVATE) 0.05 % external ointment Apply topically 3 times daily 30 g 0     clotrimazole (MYCELEX) 10 MG lozenge Place 1 lozenge (10 mg) inside cheek 5 times daily 50 lozenge 0     FLUBLOK QUADRIVALENT 0.5 ML injection        levothyroxine (SYNTHROID/LEVOTHROID) 112 MCG tablet Take 1 tablet (112 mcg) by mouth daily 30 tablet 3     lisinopril-hydrochlorothiazide (ZESTORETIC) 20-12.5 MG tablet Take 1 tablet by mouth daily 90 tablet 3     No Known Allergies    Reviewed and updated as needed this visit by clinical staff  Tobacco  Allergies  Meds   Med Hx  Surg Hx  Fam Hx  Soc Hx     Reviewed and  "updated as needed this visit by Provider                ROS:  Constitutional, HEENT, cardiovascular, pulmonary, gi and gu systems are negative, except as otherwise noted.    OBJECTIVE:     BP (!) 153/82   Pulse 101   Temp 98  F (36.7  C) (Oral)   Resp 16   Ht 1.563 m (5' 1.54\")   Wt 73.3 kg (161 lb 9.6 oz)   LMP 11/04/2013 (LMP Unknown)   SpO2 98%   BMI 30.01 kg/m    Body mass index is 30.01 kg/m .  GENERAL: healthy, alert and no distress  HENT: ear canals and TM's normal,  mouth shows white adherent plaque of the tongue with slightly erythematous base and does not scrape.  There are also at least 2 small ulcers of the lower mucosa in front of the lower teeth.  CV: regular rate and rhythm, normal S1 S2, no S3 or S4, no murmur, click or rub, no peripheral edema and peripheral pulses strong  MS: no gross musculoskeletal defects noted, no edema    Diagnostic Test Results:  Results for orders placed or performed in visit on 02/08/22   CBC with platelets     Status: None   Result Value Ref Range    WBC Count 7.7 4.0 - 11.0 10e3/uL    RBC Count 5.17 3.80 - 5.20 10e6/uL    Hemoglobin 14.7 11.7 - 15.7 g/dL    Hematocrit 45.6 %    MCV 88 78 - 100 fL    MCH 28.4 26.5 - 33.0 pg    MCHC 32.2 31.5 - 36.5 g/dL    RDW 12.7 10.0 - 15.0 %    Platelet Count 280 150 - 450 10e3/uL   Basic metabolic panel     Status: Abnormal   Result Value Ref Range    Sodium 140 133 - 144 mmol/L    Potassium 4.4 3.4 - 5.3 mmol/L    Chloride 104 94 - 109 mmol/L    Carbon Dioxide (CO2) 26 20 - 32 mmol/L    Anion Gap 10 3 - 14 mmol/L    Urea Nitrogen 13 7 - 30 mg/dL    Creatinine 0.67 0.52 - 1.04 mg/dL    Calcium 9.8 8.5 - 10.1 mg/dL    Glucose 103 (H) 70 - 99 mg/dL    GFR Estimate >90 >60 mL/min/1.73m2   TSH with free T4 reflex     Status: Normal   Result Value Ref Range    TSH 1.79 0.40 - 4.00 mU/L   Hemoglobin A1c     Status: Abnormal   Result Value Ref Range    Hemoglobin A1C 6.2 (H) 0.0 - 5.6 %           Kirill Anderson MD  [facility]

## 2022-02-14 DIAGNOSIS — B37.0 THRUSH: Primary | ICD-10-CM

## 2022-02-14 RX ORDER — NYSTATIN 100000/ML
500000 SUSPENSION, ORAL (FINAL DOSE FORM) ORAL 4 TIMES DAILY
Qty: 400 ML | Refills: 0 | Status: SHIPPED | OUTPATIENT
Start: 2022-02-14 | End: 2022-03-02

## 2022-02-14 NOTE — TELEPHONE ENCOUNTER
[facility] phone call message- patient requesting a refill:    Full Medication Name: clotrimazole (MYCELEX) 10 MG lozenge        Pharmacy confirmed as   [facility] [place], [place] - [address]  [facility] [address]  Phone: [phone] Fax: [phone]  : Yes    Additional Comments: The patient called to state the lozenges aren't working for her condition. The patient also states Dr Anderson offered a mouthwash medication if the lozenges didn't work. The patient will like to switch to the mouthwash.    OK to leave a message on voice mail? Yes    Primary language: English      needed? No    Call taken on February 14, 2022 at 1:05 PM by Anais Brown

## 2022-02-14 NOTE — TELEPHONE ENCOUNTER
Called to inform patient Dr. Acosta discontinued the lozenges and sent in the mouthwash to [facility].     Closing note.

## 2022-02-14 NOTE — TELEPHONE ENCOUNTER
Patient requesting change from clotrimazole lozenge to alternative mouthwash.     Preferred pharmacy [facility]: please advise on alternative.

## 2022-02-28 NOTE — PROGRESS NOTES
Assessment & Plan     Hypothyroidism, Hashimoto's thyroiditis  Patient's TSH levels have resolved within normal limits, recommend maintaining current therapy with 112 MCG of levothyroxine daily while monitoring for symptoms.  Patient describes no symptoms or signs of hypothyroidism, and is happy on her current dosage.  Changed refills to 90 days per patient preference into line up with her other medications.  - levothyroxine (SYNTHROID/LEVOTHROID) 112 MCG tablet  Dispense: 90 tablet; Refill: 3    Essential hypertension  Last 3 blood pressures have been out of range. Patient reports on past visits that pressures at home are normal.  Today, patient's blood pressure is within normal limits.  Hypertension well controlled on lisinopril-hydrochlorothiazide tablet.    Diabetes mellitus without complication (H)  Last A1c results 6/2, new diagnosis of DMT2. Recommend initiating metformin and lifestyle changes.  Patient's current activity level is nonexistent, agreeable to adding something in regards to activity before recheck in 3 months. Patient agreeable to changes, will return in 3 months for A1C recheck.  Diabetic foot exam performed today.  Ordered microalbumin.   - metFORMIN (GLUCOPHAGE) 500 MG tablet  Dispense: 90 tablet; Refill: 0    Environmental and seasonal allergies  Patient reports being well controlled on Zyrtec and needing a refill.  - cetirizine (ZYRTEC) 10 MG tablet  Dispense: 90 tablet; Refill: 3    Thrush  Patient reports improvement with previous nystatin mouthwash.  Having some residual discoloration on the sides of her tongue.  Refilled mouthwash x1.  - nystatin (MYCOSTATIN) 711814 UNIT/ML suspension  Dispense: 400 mL; Refill: 0      Lab Results   Component Value Date    A1C 6.2 02/08/2022    A1C 6.2 04/14/2021    A1C 4.8 04/01/2020    A1C 5.9 07/19/2018    A1C 6.1 05/23/2017    A1C 5.6 05/03/2016     BP Readings from Last 3 Encounters:   03/02/22 128/81   02/08/22 (!) 153/82   01/03/22 (!) 154/104  "    Lab Results   Component Value Date    TSH 1.79 02/08/2022    TSH 6.53 04/14/2021                BMI:   Estimated body mass index is 28.85 kg/m  as calculated from the following:    Height as of this encounter: 1.59 m (5' 2.6\").    Weight as of this encounter: 72.9 kg (160 lb 12.8 oz).   Weight management plan: Discussed healthy diet and exercise guidelines  - especially focusing on glucose control, diabetic diet.      Return in about 3 months (around 6/2/2022) for Diabetes Routine Follow-Up.    Kim Acosta DO  [facility] JESSICA Lowe is a 62 year old who presents for the following health issues :  HPI     Doing very well. Surprised that her BP is WNL today, it is usually elevated at the office and fine at home. Denies any signs/sx of hypothyroidism, happy on current dosage.     Diet/exercise has been lacking lately. Is willing to put in some work to bring her A1C down to hopefully stop metformin and Diabetes progression.     Still having some residual thrush. The mouthwash worked well, would like more.     Having some foot pain, describes as mild.       Review of Systems   Constitutional, HEENT, cardiovascular, pulmonary, gi and gu systems are negative, except as otherwise noted.      Objective    /81   Pulse 93   Temp 97.4  F (36.3  C) (Oral)   Resp 16   Ht 1.59 m (5' 2.6\")   Wt 72.9 kg (160 lb 12.8 oz)   LMP 11/04/2013 (LMP Unknown)   SpO2 99%   BMI 28.85 kg/m    Body mass index is 28.85 kg/m .  Physical Exam   GENERAL: healthy, alert and no distress  NECK: no adenopathy, no asymmetry, masses, or scars and thyroid normal to palpation  RESP: lungs clear to auscultation - no rales, rhonchi or wheezes  CV: regular rate and rhythm, normal S1 S2, no S3 or S4, no murmur, click or rub, no peripheral edema and peripheral pulses strong  MS: no gross musculoskeletal defects noted, no edema  NEURO: Normal strength and tone, mentation intact and speech normal  PSYCH: " mentation appears normal, affect normal/bright  Diabetic foot exam: normal DP and PT pulses, no trophic changes or ulcerative lesions and normal sensory exam

## 2022-03-02 ENCOUNTER — OFFICE VISIT (OUTPATIENT)
Dept: FAMILY MEDICINE | Facility: CLINIC | Age: 62
End: 2022-03-02
Payer: COMMERCIAL

## 2022-03-02 VITALS
HEIGHT: 63 IN | TEMPERATURE: 97.4 F | WEIGHT: 160.8 LBS | HEART RATE: 93 BPM | OXYGEN SATURATION: 99 % | SYSTOLIC BLOOD PRESSURE: 128 MMHG | RESPIRATION RATE: 16 BRPM | BODY MASS INDEX: 28.49 KG/M2 | DIASTOLIC BLOOD PRESSURE: 81 MMHG

## 2022-03-02 DIAGNOSIS — I10 ESSENTIAL HYPERTENSION: ICD-10-CM

## 2022-03-02 DIAGNOSIS — E06.3 HASHIMOTO'S THYROIDITIS: Primary | ICD-10-CM

## 2022-03-02 DIAGNOSIS — E11.9 DIABETES MELLITUS WITHOUT COMPLICATION (H): ICD-10-CM

## 2022-03-02 DIAGNOSIS — J30.89 ENVIRONMENTAL AND SEASONAL ALLERGIES: ICD-10-CM

## 2022-03-02 DIAGNOSIS — B37.0 THRUSH: ICD-10-CM

## 2022-03-02 DIAGNOSIS — E03.9 HYPOTHYROIDISM, UNSPECIFIED TYPE: ICD-10-CM

## 2022-03-02 LAB
CREAT UR-MCNC: 27 MG/DL
MICROALBUMIN UR-MCNC: <5 MG/L
MICROALBUMIN/CREAT UR: NORMAL MG/G{CREAT}

## 2022-03-02 PROCEDURE — 82043 UR ALBUMIN QUANTITATIVE: CPT | Performed by: STUDENT IN AN ORGANIZED HEALTH CARE EDUCATION/TRAINING PROGRAM

## 2022-03-02 PROCEDURE — 99214 OFFICE O/P EST MOD 30 MIN: CPT | Mod: GC | Performed by: STUDENT IN AN ORGANIZED HEALTH CARE EDUCATION/TRAINING PROGRAM

## 2022-03-02 RX ORDER — CETIRIZINE HYDROCHLORIDE 10 MG/1
10 TABLET ORAL DAILY
Qty: 90 TABLET | Refills: 3 | Status: SHIPPED | OUTPATIENT
Start: 2022-03-02 | End: 2023-04-24

## 2022-03-02 RX ORDER — NYSTATIN 100000/ML
500000 SUSPENSION, ORAL (FINAL DOSE FORM) ORAL 4 TIMES DAILY
Qty: 400 ML | Refills: 0 | Status: CANCELLED | OUTPATIENT
Start: 2022-03-02

## 2022-03-02 RX ORDER — NYSTATIN 100000/ML
500000 SUSPENSION, ORAL (FINAL DOSE FORM) ORAL 4 TIMES DAILY
Qty: 400 ML | Refills: 0 | Status: SHIPPED | OUTPATIENT
Start: 2022-03-02

## 2022-03-02 RX ORDER — RNA INGREDIENT BNT-162B2 0.23 G/1.8ML
INJECTION, SUSPENSION INTRAMUSCULAR
COMMUNITY
Start: 2021-11-17 | End: 2022-03-02

## 2022-03-02 RX ORDER — LEVOTHYROXINE SODIUM 112 UG/1
112 TABLET ORAL DAILY
Qty: 90 TABLET | Refills: 3 | Status: SHIPPED | OUTPATIENT
Start: 2022-03-02 | End: 2023-02-24

## 2022-03-02 NOTE — PATIENT INSTRUCTIONS
It was great to see you today! Thanks for coming to [facility]!      Here is the plan from today's visit    1. Start Metformin 500mg a day in the morning with breakfast.   2. Start some activity in your daily life, this will help with bringing your blood sugars down and bringing you out of the Diabetic range.   3. Come back in 3 months for a Diabetes recheck, maybe we can take you off of the metformin if it's lower. This will depend on your diet/exercise changes!       Thank you for coming to [facility] today.  Lab Testing:  **If you had lab testing today and your results are reassuring or normal they will be mailed to you or sent through Insync within 7 days.   **If the lab tests need quick action we will call you with the results.  **If you are having labs done on a different day, please call [phone] to schedule at [facility] or [phone] for other [facility] Outpatient Lab locations. Labs do not offer walk-in appointments.  The phone number we will call with results is # [phone] (home) . If this is not the best number please call our clinic and change the number.    Medication Refills:  If you need any refills please call your pharmacy and they will contact us.   If you need to  your refill at a new pharmacy, please contact the new pharmacy directly. The new pharmacy will help you get your medications transferred faster.       Scheduling, Urgent Medical Concerns (24 hours a day!), or ultrasound [phone] (8-5:00 M-F)    Referrals: If a referral was made to an [facility] specialty provider and you do not get a call from central scheduling, please refer to directions on your visit summary or call our office during normal business hours for assistance.     If a Mammogram was ordered for you at the Breast Center call [phone] to schedule or change your appointment.  If you had an XRay/CT/Ultrasound/MRI ordered the number is [phone] to schedule or change  your radiology appointment.       Kim Acosta,   Pronouns: she/her/hers  Resident Physician  [facility] - [facility]    Department of Family Medicine and Community Health

## 2022-04-21 ENCOUNTER — TELEPHONE (OUTPATIENT)
Dept: FAMILY MEDICINE | Facility: CLINIC | Age: 62
End: 2022-04-21

## 2022-05-03 NOTE — TELEPHONE ENCOUNTER
Aspirin 81mg last filled 12/27/2020 for #90    Thank you,    Eleanor Philip, PharmD  [facility]  [phone]    
Negative/Unknown

## 2022-06-21 ENCOUNTER — TELEPHONE (OUTPATIENT)
Dept: OTOLARYNGOLOGY | Facility: CLINIC | Age: 62
End: 2022-06-21
Payer: COMMERCIAL

## 2022-06-21 NOTE — TELEPHONE ENCOUNTER
LVM that Dr. Arcos is no longer available. He only works twice a month instead of twice a week now. Gave new date and time and call center number. Patient can go to another [facility] site if they want to get in sooner

## 2022-07-06 NOTE — PROGRESS NOTES
Assessment & Plan     Hashimoto's thyroiditis  Most recent TSH drawn in February 2022 and were within normal limits. Reporting increased fatigue today.  Rechecking thyroid levels today, based on results will adjust medications.  Has no other symptoms of hypothyroidism. She understands and agrees to plan.   - TSH with free T4 reflex  - TSH with free T4 reflex    Essential hypertension  Blood pressure today 119/83 which is an improvement from last 3 visits. Has been taking lisinopril-hydrochlorothiazide as prescribed with no side effects. Blood pressures at home in the 120 systolic range. She will return in 3 months for blood pressure recheck and may adjust HTN medications if she is too tightly controlled.     Pre-diabetes  Patient's A1c in February 2022 was 6.2 at pre-diabetic range. Recheck today revealed A1c of 6.0. She has been taking metformin 500 mcg daily, reduced bread intake, and gardens daily. We discussed her improvement and she plans to continue these lifestyle changes. Plan to followup in 3 months for A1c recheck. If continued improvement, may warrant medication de-escalation.  She understands and agrees to the plan.    - Lipid panel  - Hemoglobin A1c    Preventative health care  Discussed preventative screening. She is interested in mammogram referral which was given. She deferred 4th COVID-19 booster until fall. Not interested in pap smear today.   - Lipid panel  - Screening Mammogram Digital Bilateral    Lab Results   Component Value Date    A1C 6.2 02/08/2022    A1C 6.2 04/14/2021    A1C 4.8 04/01/2020    A1C 5.9 07/19/2018    A1C 6.1 05/23/2017    A1C 5.6 05/03/2016       TSH   Date Value Ref Range Status   02/08/2022 1.79 0.40 - 4.00 mU/L Final   04/14/2021 6.53 (H) 0.40 - 4.00 mU/L Final         Return in about 3 months (around 10/7/2022) for Routine follow up (Pre-diabetes, HTN, Thyroid).    Devika Ley, MS3    I, Kim Acosta DO,  was present with the medical student who participated in the  "service and in the documentation of this note. I have verified the history and personally performed the physical exam and medical decision making. I have verified and edited the note, which accurately reflects my assessment of the patient and the plan of care.      Kim DO Dave  [facility] JESSICA Lowe is a 62 year old, presenting for the following health issues:  Follow Up (Thyroid Check )      HPI     Hypothyroidism  Continuing to take 112 mcg dose of levothyroxine and wonders if dose is too high. Reports worsening fatigue in afternoon with increased activity. No chills, constipation, dry skin, fatigue, weight gain, brittle hair.     Pre-Diabetes  Endorses 7-8 pound weight loss since March. Has reduced bread intake and gardens everyday. She has been taking metformin 500 mg daily. Denies any concerns or side effects.     Hypertension  Has been taking lisinopril-hydrochlorothiazide. Has been checking her blood pressures at home with range of 120-123 systolic. She is unsure of her diastolic BP measurements at home. No headaches, vision changes.     Review of Systems   Constitutional, HEENT, cardiovascular, pulmonary, gi and gu systems are negative, except as otherwise noted.      Objective    /83   Pulse 97   Temp 98  F (36.7  C) (Oral)   Resp 16   Ht 1.575 m (5' 2\")   Wt 69.4 kg (153 lb)   LMP 11/04/2013 (LMP Unknown)   SpO2 97%   BMI 27.98 kg/m    Body mass index is 27.98 kg/m .  Physical Exam   GENERAL: healthy, alert and no distress  NECK: no adenopathy, no asymmetry, masses, or scars and thyroid normal to palpation  RESP: lungs clear to auscultation - no rales, rhonchi or wheezes  CV: regular rate and rhythm, normal S1 S2, no S3 or S4, no murmur, click or rub  MS: no gross musculoskeletal defects noted  NEURO: mentation intact and speech normal                 .  ..  "

## 2022-07-06 NOTE — PATIENT INSTRUCTIONS
It was great to see you today! Thanks for coming to [facility]!      Here is the plan from today's visit    Thyroid med: Will update medications with lab values.   Diabetes: GOOD JOB with weight loss and taking your metformin. Keep up the good work and we will try and get you off metformin entirely.   BP:  GREAT JOB! Looks good today, keep up the good work lady!!      Thank you for coming to [facility] today.  Lab Testing:  **If you had lab testing today and your results are reassuring or normal they will be mailed to you or sent through [employer] within 7 days.   **If the lab tests need quick action we will call you with the results.  **If you are having labs done on a different day, please call [phone] to schedule at [facility] or [phone] for other [facility] locations. Labs do not offer walk-in appointments.  The phone number we will call with results is # [phone] (home) . If this is not the best number please call our clinic and change the number.    Medication Refills:  If you need any refills please call your pharmacy and they will contact us.   If you need to  your refill at a new pharmacy, please contact the new pharmacy directly. The new pharmacy will help you get your medications transferred faster.       Scheduling, Urgent Medical Concerns (24 hours a day!), or ultrasound [phone] (8-5:00 M-F)    Referrals: If a referral was made to an [facility] specialty provider and you do not get a call from central scheduling, please refer to directions on your visit summary or call our office during normal business hours for assistance.     If a Mammogram was ordered for you at the Breast Center call [phone] to schedule or change your appointment.  If you had an XRay/CT/Ultrasound/MRI ordered the number is [phone] to schedule or change your radiology appointment.       Kim Acosta, DO  Pronouns: she/her/hers  Resident  Physician  [facility]/ \[facility]\"" [facility]    Department of Family Medicine and Community Health

## 2022-07-07 ENCOUNTER — OFFICE VISIT (OUTPATIENT)
Dept: FAMILY MEDICINE | Facility: CLINIC | Age: 62
End: 2022-07-07
Payer: COMMERCIAL

## 2022-07-07 VITALS
HEART RATE: 97 BPM | SYSTOLIC BLOOD PRESSURE: 119 MMHG | RESPIRATION RATE: 16 BRPM | HEIGHT: 62 IN | OXYGEN SATURATION: 97 % | TEMPERATURE: 98 F | BODY MASS INDEX: 28.16 KG/M2 | DIASTOLIC BLOOD PRESSURE: 83 MMHG | WEIGHT: 153 LBS

## 2022-07-07 DIAGNOSIS — R73.03 PRE-DIABETES: ICD-10-CM

## 2022-07-07 DIAGNOSIS — Z00.00 PREVENTATIVE HEALTH CARE: ICD-10-CM

## 2022-07-07 DIAGNOSIS — I10 ESSENTIAL HYPERTENSION: ICD-10-CM

## 2022-07-07 DIAGNOSIS — E06.3 HASHIMOTO'S THYROIDITIS: Primary | ICD-10-CM

## 2022-07-07 LAB
CHOLEST SERPL-MCNC: 117 MG/DL
HBA1C MFR BLD: 6 % (ref 0–5.6)
HDLC SERPL-MCNC: 39 MG/DL
LDLC SERPL CALC-MCNC: 49 MG/DL
NONHDLC SERPL-MCNC: 78 MG/DL
TRIGL SERPL-MCNC: 145 MG/DL
TSH SERPL DL<=0.005 MIU/L-ACNC: 1.1 UIU/ML (ref 0.3–4.2)

## 2022-07-07 PROCEDURE — 80061 LIPID PANEL: CPT | Performed by: STUDENT IN AN ORGANIZED HEALTH CARE EDUCATION/TRAINING PROGRAM

## 2022-07-07 PROCEDURE — 83036 HEMOGLOBIN GLYCOSYLATED A1C: CPT | Performed by: STUDENT IN AN ORGANIZED HEALTH CARE EDUCATION/TRAINING PROGRAM

## 2022-07-07 PROCEDURE — 36415 COLL VENOUS BLD VENIPUNCTURE: CPT | Performed by: STUDENT IN AN ORGANIZED HEALTH CARE EDUCATION/TRAINING PROGRAM

## 2022-07-07 PROCEDURE — 99214 OFFICE O/P EST MOD 30 MIN: CPT | Mod: GC | Performed by: STUDENT IN AN ORGANIZED HEALTH CARE EDUCATION/TRAINING PROGRAM

## 2022-07-07 PROCEDURE — 84443 ASSAY THYROID STIM HORMONE: CPT | Performed by: STUDENT IN AN ORGANIZED HEALTH CARE EDUCATION/TRAINING PROGRAM

## 2022-07-07 NOTE — LETTER
July 8, 2022      Chrissy Looney  [address] [facility] [address]        Dear ,    We are writing to inform you of your test results.    Your TSH is within the expected range. If you are still feeling increased fatigue in the next few weeks, please let us know and we'll do some more investigation. Keep up the good work with diet/exercise and we'll see your A1C come down even further. Great work!     Resulted Orders   TSH with free T4 reflex   Result Value Ref Range    TSH 1.10 0.30 - 4.20 uIU/mL   Lipid panel   Result Value Ref Range    Cholesterol 117 <200 mg/dL    Triglycerides 145 <150 mg/dL    Direct Measure HDL 39 (L) >=50 mg/dL    LDL Cholesterol Calculated 49 <=100 mg/dL    Non HDL Cholesterol 78 <130 mg/dL    Narrative    Cholesterol  Desirable:  <200 mg/dL    Triglycerides  Normal:  Less than 150 mg/dL  Borderline High:  150-199 mg/dL  High:  200-499 mg/dL  Very High:  Greater than or equal to 500 mg/dL    Direct Measure HDL  Female:  Greater than or equal to 50 mg/dL   Male:  Greater than or equal to 40 mg/dL    LDL Cholesterol  Desirable:  <100mg/dL  Above Desirable:  100-129 mg/dL   Borderline High:  130-159 mg/dL   High:  160-189 mg/dL   Very High:  >= 190 mg/dL    Non HDL Cholesterol  Desirable:  130 mg/dL  Above Desirable:  130-159 mg/dL  Borderline High:  160-189 mg/dL  High:  190-219 mg/dL  Very High:  Greater than or equal to 220 mg/dL   Hemoglobin A1c   Result Value Ref Range    Hemoglobin A1C 6.0 (H) 0.0 - 5.6 %      Comment:      Normal <5.7%   Prediabetes 5.7-6.4%    Diabetes 6.5% or higher     Note: Adopted from ADA consensus guidelines.       If you have any questions or concerns, please call the clinic at the number listed above.       Sincerely,      Kim Acosta, DO

## 2022-07-07 NOTE — PROGRESS NOTES
Preceptor Attestation:   Patient seen, evaluated and discussed with the resident. I have verified the content of the note, which accurately reflects my assessment of the patient and the plan of care.   Supervising Physician:  Murray Correa MD

## 2022-07-26 ENCOUNTER — ANCILLARY PROCEDURE (OUTPATIENT)
Dept: MAMMOGRAPHY | Facility: CLINIC | Age: 62
End: 2022-07-26
Attending: FAMILY MEDICINE
Payer: COMMERCIAL

## 2022-07-26 DIAGNOSIS — Z00.00 PREVENTATIVE HEALTH CARE: ICD-10-CM

## 2022-07-26 PROCEDURE — 77067 SCR MAMMO BI INCL CAD: CPT

## 2022-07-26 PROCEDURE — 77067 SCR MAMMO BI INCL CAD: CPT | Mod: 26 | Performed by: STUDENT IN AN ORGANIZED HEALTH CARE EDUCATION/TRAINING PROGRAM

## 2022-07-29 ENCOUNTER — TELEPHONE (OUTPATIENT)
Dept: AUDIOLOGY | Facility: CLINIC | Age: 62
End: 2022-07-29

## 2022-07-29 NOTE — TELEPHONE ENCOUNTER
Pt called call center stating her hearing aid  is broken. Patient is under warranty so a new one will be ordered and we will call her when it comes in.        Gladys White, [facility]  Licensed Audiologist  [place] #[id number]

## 2022-08-05 ENCOUNTER — TELEPHONE (OUTPATIENT)
Dept: AUDIOLOGY | Facility: CLINIC | Age: 62
End: 2022-08-05

## 2022-08-05 NOTE — TELEPHONE ENCOUNTER
Spoke with patient and we will mail the correct  to her when we get it in clinic.    Ioana Castañeda, [facility]  Licensed Audiologist  [place] License #[id number]      [facility] Call Center    Phone Message    May a detailed message be left on voicemail: yes     Reason for Call: Other: Pt called into say that she got a square  that her ear molds don't click down into charge. Her old one was shaped like egg and they clicked down in and charged. Please call to discuss. Thank you     Action Taken: Message routed to:  [facility] Audiology    Travel Screening: Not Applicable

## 2022-09-05 NOTE — LETTER
2/4/2021       RE: Chrissy Looney  [address] [facility] [address]     Dear Colleague,    Thank you for referring your patient, Chrissy Looney, to the [facility] [place] [facility]. Please see a copy of my visit note below.    The patient presents with a history of hearing loss. She has worn hearing aids in both ears in the past. She reports a long family history of progressive sensorineural hearing loss.  The patient reports a progressive hearing loss in both ears over at least the past two years. The patient denies sinusitis, rhinitis, facial pain, nasal obstruction or purulent nasal discharge. The patient denies chronic or recurrent tonsillitis, chronic or recurrent pharyngitis. The patient denies otalgia, otorrhea, eustachian tube dysfunction, ear infections, dizziness or tinnitus.     Her Audiogram and Tympanogram are reviewed with her and these demonstrate severe bilateral sensorineural hearing loss that is symmetric. Her word recognition scores are 92% on the right and 88% on the left. Her tympanograms are normal.     This patient is seen in consultation at the request of Dr. Britney Bautista.     All other systems were reviewed and they are either negative or they are not directly pertinent to this Otolaryngology examination.     Past Medical History:    Past Medical History:   Diagnosis Date     NO ACTIVE PROBLEMS        Past Surgical History:    Past Surgical History:   Procedure Laterality Date     NO HISTORY OF SURGERY         Medications:      Current Outpatient Medications:      acetaminophen (TYLENOL) 500 MG tablet, Take 1-2 tablets (500-1,000 mg) by mouth every 6 hours as needed for mild pain, Disp: 1 Bottle, Rfl: 11     aspirin (ASA) 81 MG EC tablet, Take 1 tablet (81 mg) by mouth daily, Disp: 90 tablet, Rfl: 1     atorvastatin (LIPITOR) 40 MG tablet, Take 1 tablet (40 mg) by mouth daily, Disp: 90  tablet, Rfl: 3     cetirizine (ZYRTEC) 10 MG tablet, Take 1 tablet (10 mg) by mouth daily, Disp: 90 tablet, Rfl: 3     diphenhydrAMINE (BENADRYL) 25 MG tablet, Take 1-2 tablets (25-50 mg) by mouth nightly as needed (itching, inflammation from rash), Disp: 30 tablet, Rfl: 11     famotidine (PEPCID) 20 MG tablet, Take 1 tablet (20 mg) by mouth 2 times daily as needed (stomach upset) (Patient not taking: Reported on 11/23/2020), Disp: 60 tablet, Rfl: 1     levothyroxine (SYNTHROID/LEVOTHROID) 100 MCG tablet, Take 1 tablet (100 mcg) by mouth daily, Disp: 90 tablet, Rfl: 1     levothyroxine (SYNTHROID/LEVOTHROID) 75 MCG tablet, Take 1 tablet (75 mcg) by mouth daily (Patient not taking: Reported on 11/23/2020), Disp: 60 tablet, Rfl: 1     lisinopril-hydrochlorothiazide (ZESTORETIC) 20-12.5 MG tablet, Take 1 tablet by mouth daily, Disp: 90 tablet, Rfl: 3     metoprolol succinate ER (TOPROL-XL) 25 MG 24 hr tablet, Take 1 tablet (25 mg) by mouth daily, Disp: 30 tablet, Rfl: 3     nitroGLYcerin (NITROSTAT) 0.3 MG sublingual tablet, For chest pain place 1 tablet under the tongue every 5 minutes for 3 doses. If symptoms persist 5 minutes after 1st dose call 911. (Patient not taking: Reported on 11/23/2020), Disp: 60 tablet, Rfl: 1     SUMAtriptan (IMITREX) 5 MG/ACT nasal spray, Spray 1 spray in nostril as needed for migraine May repeat in 2 hours. Max 8 sprays/24 hours. (Patient not taking: Reported on 11/23/2020), Disp: 1 each, Rfl: 3    Current Facility-Administered Medications:      aspirin (ASA) tablet 325 mg, 325 mg, Oral, Once, Augusta Cardozo MD    Allergies:    Patient has no known allergies.    Physical Examination:    The patient is a well developed, well nourished female in no apparent distress.  She is normocephalic, atraumatic with pupils equally round and reactive to light.    Oral Cavity Examination:  Normal mucosa with no masses or lesions  Nasal Examination: Normal mucosa with no masses or lesions  Ear  Examination: Ear canals clear, tympanic membranes and middle ear spaces normal  Neurological Examination: Facial nerve function intact and symmetric  Integumentary Examination: No lesions on the skin of the head and neck  Neck Examination: No masses or lesions, no lymphadenopathy  Endocrine Examination: Normal thyroid examination    Assessment and Plan:    The patient presents with a history of bilateral sensorineural hearing loss with a family history of such hearing loss. She has worn hearing aids in the past and she is scheduled to obtain new hearing aids. She will be seen again yearly to review Audiogram and Tympanograms and she will proceed with her scheduled Audiology visit today for fitting new hearing aids.     CC: Dr. Britney Bautista      Again, thank you for allowing me to participate in the care of your patient.      Sincerely,    Mack Arcos MD       3 = A little assistance

## 2022-10-21 ENCOUNTER — OFFICE VISIT (OUTPATIENT)
Dept: FAMILY MEDICINE | Facility: CLINIC | Age: 62
End: 2022-10-21
Payer: COMMERCIAL

## 2022-10-21 VITALS
HEIGHT: 62 IN | BODY MASS INDEX: 27.12 KG/M2 | WEIGHT: 147.4 LBS | TEMPERATURE: 97.6 F | SYSTOLIC BLOOD PRESSURE: 136 MMHG | OXYGEN SATURATION: 97 % | HEART RATE: 87 BPM | DIASTOLIC BLOOD PRESSURE: 83 MMHG

## 2022-10-21 DIAGNOSIS — Z00.00 ROUTINE GENERAL MEDICAL EXAMINATION AT A HEALTH CARE FACILITY: Primary | ICD-10-CM

## 2022-10-21 DIAGNOSIS — Z12.4 SCREENING FOR CERVICAL CANCER: ICD-10-CM

## 2022-10-21 DIAGNOSIS — Z23 NEED FOR PROPHYLACTIC VACCINATION AND INOCULATION AGAINST INFLUENZA: ICD-10-CM

## 2022-10-21 DIAGNOSIS — R73.03 PRE-DIABETES: ICD-10-CM

## 2022-10-21 LAB — HBA1C MFR BLD: 5.9 % (ref 0–5.6)

## 2022-10-21 PROCEDURE — 36415 COLL VENOUS BLD VENIPUNCTURE: CPT | Performed by: STUDENT IN AN ORGANIZED HEALTH CARE EDUCATION/TRAINING PROGRAM

## 2022-10-21 PROCEDURE — 88175 CYTOPATH C/V AUTO FLUID REDO: CPT | Performed by: STUDENT IN AN ORGANIZED HEALTH CARE EDUCATION/TRAINING PROGRAM

## 2022-10-21 PROCEDURE — 90471 IMMUNIZATION ADMIN: CPT | Performed by: STUDENT IN AN ORGANIZED HEALTH CARE EDUCATION/TRAINING PROGRAM

## 2022-10-21 PROCEDURE — 90682 RIV4 VACC RECOMBINANT DNA IM: CPT | Performed by: STUDENT IN AN ORGANIZED HEALTH CARE EDUCATION/TRAINING PROGRAM

## 2022-10-21 PROCEDURE — 83036 HEMOGLOBIN GLYCOSYLATED A1C: CPT | Performed by: STUDENT IN AN ORGANIZED HEALTH CARE EDUCATION/TRAINING PROGRAM

## 2022-10-21 PROCEDURE — 99396 PREV VISIT EST AGE 40-64: CPT | Mod: 25 | Performed by: STUDENT IN AN ORGANIZED HEALTH CARE EDUCATION/TRAINING PROGRAM

## 2022-10-21 RX ORDER — BNT162B2 0.23 MG/2.25ML
INJECTION, SUSPENSION INTRAMUSCULAR
COMMUNITY
Start: 2022-07-20

## 2022-10-21 ASSESSMENT — ENCOUNTER SYMPTOMS
PALPITATIONS: 0
MYALGIAS: 0
CHILLS: 0
HEADACHES: 0
NAUSEA: 0
FREQUENCY: 0
HEMATURIA: 0
HEMATOCHEZIA: 0
PARESTHESIAS: 0
JOINT SWELLING: 0
WEAKNESS: 0
HEARTBURN: 0
SHORTNESS OF BREATH: 0
DIARRHEA: 0
NERVOUS/ANXIOUS: 0
SORE THROAT: 0
FEVER: 0
DIZZINESS: 0
DYSURIA: 0
ABDOMINAL PAIN: 0
EYE PAIN: 0
CONSTIPATION: 0
COUGH: 0
BREAST MASS: 0
ARTHRALGIAS: 0

## 2022-10-21 NOTE — LETTER
October 25, 2022      Chrissy Looney  [address] [facility] [address]        Dear ,    We are writing to inform you of your test results.    You are still in pre-diabetes range, but stable!! Good work! :) See you soon.     Resulted Orders   Hemoglobin A1c   Result Value Ref Range    Hemoglobin A1C 5.9 (H) 0.0 - 5.6 %      Comment:      Normal <5.7%   Prediabetes 5.7-6.4%    Diabetes 6.5% or higher     Note: Adopted from ADA consensus guidelines.       If you have any questions or concerns, please call the clinic at the number listed above.       Sincerely,    Kim Acosta, DO  Pronouns: she/her/hers  Resident Physician  [facility]/ [facility]    Department of Family Medicine and Community Health

## 2022-10-21 NOTE — PROGRESS NOTES
SUBJECTIVE:   CC: Chrissy is an 62 year old who presents for preventive health visit.       Patient has been advised of split billing requirements and indicates understanding: Yes  Healthy Habits:     Getting at least 3 servings of Calcium per day:  Yes    Bi-annual eye exam:  Yes    Dental care twice a year:  NO    Sleep apnea or symptoms of sleep apnea:  None    Diet:  Low salt    Frequency of exercise:  4-5 days/week    Duration of exercise:  15-30 minutes    Taking medications regularly:  Yes    Medication side effects:  None    PHQ-2 Total Score: 2    Additional concerns today:  No      Having a lot going on.   - Dog hurt themselves  - Stove went out, new stove  - Gas smell in house    Dentist  - 2 years ago  - had crowns last time, expensive        Today's PHQ-2 Score:   PHQ-2 (  [employer]) 10/21/2022   Q1: Little interest or pleasure in doing things 1   Q2: Feeling down, depressed or hopeless 1   PHQ-2 Score 2   PHQ-2 Total Score (12-17 Years)- Positive if 3 or more points; Administer PHQ-A if positive -   Q1: Little interest or pleasure in doing things Not at all   Q2: Feeling down, depressed or hopeless Not at all   PHQ-2 Score 0       Abuse: Current or Past (Physical, Sexual or Emotional) - No  Do you feel safe in your environment? Yes    Have you ever done Advance Care Planning? (For example, a Health Directive, POLST, or a discussion with a medical provider or your loved ones about your wishes): No, advance care planning information given to patient to review.  Patient plans to discuss their wishes with loved ones or provider.      Social History     Tobacco Use     Smoking status: Former     Types: Cigarettes     Quit date: 2004     Years since quittin.5     Smokeless tobacco: Never     Tobacco comments:     quit    Substance Use Topics     Alcohol use: Yes     Comment: once  a month     If you drink alcohol do you typically have >3 drinks per day or >7 drinks per week? No    Alcohol  Use 10/21/2022   Prescreen: >3 drinks/day or >7 drinks/week? No   No flowsheet data found.    Reviewed orders with patient.  Reviewed health maintenance and updated orders accordingly - Yes      Breast Cancer Screening:  Any new diagnosis of family breast, ovarian, or bowel cancer? No    FHS-7:   Breast CA Risk Assessment (FHS-7) 7/26/2022   Did any of your first-degree relatives have breast or ovarian cancer? No   Did any of your relatives have bilateral breast cancer? No   Did any man in your family have breast cancer? No   Did any woman in your family have breast and ovarian cancer? No   Did any woman in your family have breast cancer before age 50 y? No   Do you have 2 or more relatives with breast and/or ovarian cancer? Yes   Do you have 2 or more relatives with breast and/or bowel cancer? Yes     Mammogram done recently, normal.     Pertinent mammograms are reviewed under the imaging tab.    History of abnormal Pap smear: Yes, 2018  PAP / HPV Latest Ref Rng & Units 4/14/2021 11/29/2018 1/13/2014   PAP (Historical) - NIL NIL NIL   HPV16 NEG:Negative Negative Negative -   HPV18 NEG:Negative Negative Negative -   HRHPV NEG:Negative Negative Positive(A) -     Reviewed and updated as needed this visit by clinical staff    Allergies  Meds              Reviewed and updated as needed this visit by Provider                     Review of Systems   Constitutional: Negative for chills and fever.   HENT: Positive for hearing loss. Negative for congestion, ear pain and sore throat.    Eyes: Negative for pain and visual disturbance.   Respiratory: Negative for cough and shortness of breath.    Cardiovascular: Negative for chest pain, palpitations and peripheral edema.   Gastrointestinal: Negative for abdominal pain, constipation, diarrhea, heartburn, hematochezia and nausea.   Breasts:  Negative for tenderness, breast mass and discharge.   Genitourinary: Negative for dysuria, frequency, genital sores, hematuria, pelvic  "pain, urgency, vaginal bleeding and vaginal discharge.   Musculoskeletal: Negative for arthralgias, joint swelling and myalgias.   Skin: Negative for rash.   Neurological: Negative for dizziness, weakness, headaches and paresthesias.   Psychiatric/Behavioral: Negative for mood changes. The patient is not nervous/anxious.      Hearing stable.     OBJECTIVE:   BP (!) 147/81   Pulse 87   Temp 97.6  F (36.4  C) (Oral)   Ht 1.568 m (5' 1.73\")   Wt 66.9 kg (147 lb 6.4 oz)   LMP 11/04/2013 (LMP Unknown)   SpO2 97%   BMI 27.19 kg/m    Physical Exam  GENERAL APPEARANCE: healthy, alert and no distress  EYES: Eyes grossly normal to inspection, PERRL and conjunctivae and sclerae normal  RESP: lungs clear to auscultation - no rales, rhonchi or wheezes  BREAST: normal without masses, tenderness or nipple discharge and no palpable axillary masses or adenopathy  CV: regular rate and rhythm, normal S1 S2, no S3 or S4, no murmur, click or rub, no peripheral edema and peripheral pulses strong  ABDOMEN: soft, nontender, no hepatosplenomegaly, no masses and bowel sounds normal   (female): normal female external genitalia, normal urethral meatus, vaginal mucosal atrophy noted, normal cervix, adnexae, and uterus without masses or abnormal discharge  MS: no musculoskeletal defects are noted and gait is age appropriate without ataxia  SKIN: no suspicious lesions or rashes  NEURO: Normal strength and tone, sensory exam grossly normal, mentation intact and speech normal  PSYCH: mentation appears normal and affect normal/bright      ASSESSMENT/PLAN:   Chrissy was seen today for physical and imm/inj.    Diagnoses and all orders for this visit:    Routine general medical examination at a health care facility  Pre-diabetes  Patient doing extremely well, diet and exercise changes implemented over the last year leading to decreased weight.  Motivation is to get off metformin and get below the prediabetes range of A1c.  Lives at home with " "her  and her dog.  Educated on the need for COVID booster, shingles immunization, and pneumococcal vaccine.  Patient stated she would seek out COVID by valent booster and shingles immunization at pharmacy.  Patient had no other concerns today.  Collecting A1c today.  -     Pap imaged thin layer screen with HPV - recommended age 30 - 65 years  -     Hemoglobin A1c    Screening for cervical cancer  Pap performed today with no abnormal findings on external or internal exam.  -     Pap imaged thin layer screen with HPV - recommended age 30 - 65 years      Need for prophylactic vaccination and inoculation against influenza  -     INFLUENZA QUAD, RECOMBINANT, P-FREE (RIV4) (FLUBLOK)        COUNSELING:  Reviewed preventive health counseling, as reflected in patient instructions       Regular exercise       Healthy diet/nutrition       Hearing screening       Immunizations    Vaccinated for: Influenza      Talked about COVID booster, Pneumovacc, and Shingles vaccination      Estimated body mass index is 27.19 kg/m  as calculated from the following:    Height as of this encounter: 1.568 m (5' 1.73\").    Weight as of this encounter: 66.9 kg (147 lb 6.4 oz).    Weight management plan: Discussed healthy diet and exercise guidelines    She reports that she quit smoking about 18 years ago. She has never used smokeless tobacco.      Kim Acosta, DO  [facility]    "

## 2022-10-25 LAB
BKR LAB AP GYN ADEQUACY: NORMAL
BKR LAB AP GYN INTERPRETATION: NORMAL
BKR LAB AP HPV REFLEX: NORMAL
BKR LAB AP PREVIOUS ABNL DX: NORMAL
BKR LAB AP PREVIOUS ABNORMAL: NORMAL
PATH REPORT.COMMENTS IMP SPEC: NORMAL
PATH REPORT.COMMENTS IMP SPEC: NORMAL
PATH REPORT.RELEVANT HX SPEC: NORMAL

## 2023-01-02 DIAGNOSIS — R06.09 EXERTIONAL DYSPNEA: ICD-10-CM

## 2023-01-02 RX ORDER — ATORVASTATIN CALCIUM 40 MG/1
40 TABLET, FILM COATED ORAL DAILY
Qty: 90 TABLET | Refills: 3 | Status: SHIPPED | OUTPATIENT
Start: 2023-01-02 | End: 2023-12-28

## 2023-01-15 ENCOUNTER — TRANSFERRED RECORDS (OUTPATIENT)
Dept: MULTI SPECIALTY CLINIC | Facility: CLINIC | Age: 63
End: 2023-01-15

## 2023-01-15 LAB — RETINOPATHY: NORMAL

## 2023-02-09 ENCOUNTER — OFFICE VISIT (OUTPATIENT)
Dept: FAMILY MEDICINE | Facility: CLINIC | Age: 63
End: 2023-02-09
Payer: COMMERCIAL

## 2023-02-09 VITALS
RESPIRATION RATE: 16 BRPM | DIASTOLIC BLOOD PRESSURE: 89 MMHG | HEART RATE: 104 BPM | OXYGEN SATURATION: 97 % | SYSTOLIC BLOOD PRESSURE: 154 MMHG | HEIGHT: 62 IN | TEMPERATURE: 97.8 F | BODY MASS INDEX: 28.41 KG/M2 | WEIGHT: 154.4 LBS

## 2023-02-09 DIAGNOSIS — M25.539 PAIN OF WRIST WITH PALPABLE MASS: Primary | ICD-10-CM

## 2023-02-09 DIAGNOSIS — I10 BENIGN ESSENTIAL HYPERTENSION: ICD-10-CM

## 2023-02-09 DIAGNOSIS — R22.30 PAIN OF WRIST WITH PALPABLE MASS: Primary | ICD-10-CM

## 2023-02-09 LAB
ANION GAP SERPL CALCULATED.3IONS-SCNC: 15 MMOL/L (ref 7–15)
BUN SERPL-MCNC: 12.8 MG/DL (ref 8–23)
CALCIUM SERPL-MCNC: 9.7 MG/DL (ref 8.8–10.2)
CHLORIDE SERPL-SCNC: 101 MMOL/L (ref 98–107)
CREAT SERPL-MCNC: 0.65 MG/DL (ref 0.51–0.95)
DEPRECATED HCO3 PLAS-SCNC: 24 MMOL/L (ref 22–29)
GFR SERPL CREATININE-BSD FRML MDRD: >90 ML/MIN/1.73M2
GLUCOSE SERPL-MCNC: 106 MG/DL (ref 70–99)
POTASSIUM SERPL-SCNC: 3.8 MMOL/L (ref 3.4–5.3)
SODIUM SERPL-SCNC: 140 MMOL/L (ref 136–145)

## 2023-02-09 PROCEDURE — 99213 OFFICE O/P EST LOW 20 MIN: CPT | Mod: GC | Performed by: STUDENT IN AN ORGANIZED HEALTH CARE EDUCATION/TRAINING PROGRAM

## 2023-02-09 PROCEDURE — 80048 BASIC METABOLIC PNL TOTAL CA: CPT | Performed by: STUDENT IN AN ORGANIZED HEALTH CARE EDUCATION/TRAINING PROGRAM

## 2023-02-09 PROCEDURE — 36415 COLL VENOUS BLD VENIPUNCTURE: CPT | Performed by: STUDENT IN AN ORGANIZED HEALTH CARE EDUCATION/TRAINING PROGRAM

## 2023-02-09 NOTE — PATIENT INSTRUCTIONS
It was great to see you today! Thanks for coming to [facility]!      Here is the plan from today's visit    You likely have a small lipoma or ganglion cyst on your wrist.   Take it easy, stop carrying the dog.   Come back if it is painful or changes in size.       Thank you for coming to [facility] today.  Lab Testing:  **If you had lab testing today and your results are reassuring or normal they will be mailed to you or sent through [employer] within 7 days.   **If the lab tests need quick action we will call you with the results.  **If you are having labs done on a different day, please call [phone] to schedule at [facility] or [phone] for other [facility] locations. Labs do not offer walk-in appointments.  The phone number we will call with results is # [phone] (home) . If this is not the best number please call our clinic and change the number.    Medication Refills:  If you need any refills please call your pharmacy and they will contact us.   If you need to  your refill at a new pharmacy, please contact the new pharmacy directly. The new pharmacy will help you get your medications transferred faster.       Scheduling, Urgent Medical Concerns (24 hours a day!), or ultrasound [phone] (8-5:00 M-F)    Referrals: If a referral was made to an [facility] specialty provider and you do not get a call from central scheduling, please refer to directions on your visit summary or call our office during normal business hours for assistance.     If a Mammogram was ordered for you at the Breast Center call [phone] to schedule or change your appointment.  If you had an XRay/CT/Ultrasound/MRI ordered the number is [phone] to schedule or change your radiology appointment.       Kim Acosta, DO  Pronouns: she/her/hers  Resident Physician  [facility] - [facility]/ [facility]    Department of Family Medicine and Community Health

## 2023-02-09 NOTE — LETTER
February 10, 2023  Chrissy Looney  [address] [facility] [place] [address]  Dear ,  We are writing to inform you of your test results. Your test results fall within the expected range(s) or remain unchanged from previous results.  Please continue with current treatment plan.    Resulted Orders   Basic metabolic panel   Result Value Ref Range    Sodium 140 136 - 145 mmol/L    Potassium 3.8 3.4 - 5.3 mmol/L    Chloride 101 98 - 107 mmol/L    Carbon Dioxide (CO2) 24 22 - 29 mmol/L    Anion Gap 15 7 - 15 mmol/L    Urea Nitrogen 12.8 8.0 - 23.0 mg/dL    Creatinine 0.65 0.51 - 0.95 mg/dL    Calcium 9.7 8.8 - 10.2 mg/dL    Glucose 106 (H) 70 - 99 mg/dL    GFR Estimate >90 >60 mL/min/1.73m2      Comment:      eGFR calculated using 2021 CKD-EPI equation.       If you have any questions or concerns, please call the clinic at the number listed above.   Sincerely,      Kim Acosta, DO  Pronouns: she/her/hers  Resident Physician  [facility]/ [facility]    Department of Family Medicine and Community Health

## 2023-02-09 NOTE — PROGRESS NOTES
"  Assessment & Plan     Pain of wrist with palpable mass  Patient presenting with a spherical mass about 2 cm in diameter on the medial aspect of the wrist.  Since noticing it patient has not noted any increase in size, tenderness, nor any other change in characteristics.  Not painful and does not impede her daily activities.  Ultrasound utilized in exam room, revealing a spherical mass in the soft tissue layer, clear border, and anechoic interior. Ddx includes lipoma or ganglion cyst. Management essentially identical. Education provide. If it starts to impede her ADLs or iADLs, patient instructed to return to clinic to seek further treatment. No intervention today.    Benign essential hypertension  Elevated blood pressure in the office today x2. Patient reports her BP is in normal range at home when she takes it about once a week. Asked patient to keep a long and follow up with this provider at her next scheduled visit (next month) to assess if increase in antihypertensive regimen is indicated.  - Basic metabolic panel        No follow-ups on file.    Kim Acosta [facility] JESSICA Lowe is a 62 year old, presenting for the following health issues:  Mass (Pt reports  after holding her dog ( 40 pounds)the lump come up on left wrist.)      HPI     Mass  - a couple weeks  - Has been carrying dog up and down the stairs for a few months  - 40lb dog  - has been straining her wrist with this activity  - He can now go up and the stairs on his own   - Aches when she's holding the phone  - No \"pain pain\"        Review of Systems         Objective    BP (!) 154/89   Pulse 104   Temp 97.8  F (36.6  C) (Oral)   Resp 16   Ht 1.568 m (5' 1.73\")   Wt 70 kg (154 lb 6.4 oz)   LMP 11/04/2013 (LMP Unknown)   SpO2 97%   BMI 28.49 kg/m    Body mass index is 28.49 kg/m .  Physical Exam   Constitutional: No acute distress, sitting comfortably  Eyes: EOMI, no eye discharge, no icterus, " injection or swelling.  Ears, nose, mouth, throat: Normocephalic, no nasal drainage, speaks clearly, no lip cracking.   Neck: Normal range of motion  CV: Extremeties well perfused  Respiratory: No audible wheezing, strido, cough, or other respiratory distress. Speaking in full sentences.  GI: Nondistended abdomen  MSK: 2cm spherical, nontender, mobile, soft mass on medial aspect of wrist.   Skin: No visible rashes, bruising or other skin lesions.   Neuro: AOx3  Psych: Cooperative, mood, thought and judgement appropriate to situation.

## 2023-02-16 ENCOUNTER — TELEPHONE (OUTPATIENT)
Dept: FAMILY MEDICINE | Facility: CLINIC | Age: 63
End: 2023-02-16

## 2023-02-16 DIAGNOSIS — Z20.822 SUSPECTED 2019 NOVEL CORONAVIRUS INFECTION: Primary | ICD-10-CM

## 2023-02-16 NOTE — TELEPHONE ENCOUNTER
RN COVID TREATMENT VISIT  02/16/23      The patient has been triaged and does not require a higher level of care.    Chrissy Looney  62 year old  Current weight? 152 lbs    Has the patient been [facility] by a [facility] within the past two years? Yes.   Have you been in close proximity to/do you have a known exposure to a person with a confirmed case of influenza? No.     General treatment eligibility:  Date of positive COVID test (PCR or at home)?  02/15/2023    Are you or have you been hospitalized for this COVID-19 infection? No.   Have you received monoclonal antibodies or antiviral treatment for COVID-19 since this positive test? No.   Do you have any of the following conditions that place you at risk of being very sick from COVID-19?   - Age 50 years or older  - Diabetes mellitus, type 1 and type 2  - Overweight or Obesity (BMI >85th percentile or BMI 25 or higher)  - Patient reports sedentary lifestyle (physically inactive)  - Current or former smoker  Yes, patient has at least one high risk condition as noted above.     Current COVID symptoms:   - cough  - muscle or body aches  - headache  Yes. Patient has at least one symptom as selected.     How many days since symptoms started? 5 days or less. Established patient, 12 years or older weighing at least 88.2 lbs, who has symptoms that started in the past 5 days, has not been hospitalized nor received treatment already, and is at risk for being very sick from COVID-19.     Treatment eligibility by RN:    Are you currently pregnant or nursing? No    Do you have a clinically significant hypersensitivity to nirmatrelvir or ritonavir, or toxic epidermal necrolysis (TEN) or Concepcion-Jarod Syndrome? No    Do you have a history of hepatitis, any hepatic impairment on the Problem List (such as Child-Kincaid Class C, cirrhosis, fatty liver disease, alcoholic liver disease), or was the last liver lab (hepatic  panel, ALT, AST, ALK Phos, bilirubin) elevated in the past 6 months? No    Do you have any history of severe renal impairment (eGFR < 30mL/min)? No    Is patient eligible to continue?   Yes, patient meets all eligibility requirements for the RN COVID treatment (as denoted by all no responses above).     Current Outpatient Medications   Medication Sig Dispense Refill     acetaminophen (TYLENOL) 500 MG tablet Take 1-2 tablets (500-1,000 mg) by mouth every 6 hours as needed for mild pain 1 Bottle 11     atorvastatin (LIPITOR) 40 MG tablet Take 1 tablet (40 mg) by mouth daily 90 tablet 3     cetirizine (ZYRTEC) 10 MG tablet Take 1 tablet (10 mg) by mouth daily 90 tablet 3     clobetasol (TEMOVATE) 0.05 % external ointment Apply topically 3 times daily 30 g 0     FLUBLOK QUADRIVALENT 0.5 ML injection        levothyroxine (SYNTHROID/LEVOTHROID) 112 MCG tablet Take 1 tablet (112 mcg) by mouth daily 90 tablet 3     lisinopril-hydrochlorothiazide (ZESTORETIC) 20-12.5 MG tablet Take 1 tablet by mouth daily 90 tablet 3     metFORMIN (GLUCOPHAGE) 500 MG tablet Take 1 tablet (500 mg) by mouth daily (with breakfast) 90 tablet 3     nystatin (MYCOSTATIN) 087121 UNIT/ML suspension Take 5 mLs (500,000 Units) by mouth 4 times daily 400 mL 0     PFIZER-BIONT COVID-19 VAC-DRAKE 30 MCG/0.3ML injection          Medications from List 1 of the standing order (on medications that exclude the use of Paxlovid) that patient is taking: NONE. Is patient taking Brundage's Wort? No  Is patient taking Gabe's Wort or any meds from List 1? No.   Medications from List 2 of the standing order (on meds that provider needs to adjust) that patient is taking: NONE. Is patient on any of the meds from List 2? No.   Medications from List 3 of standing order (on meds that a RN needs to adjust) that patient is taking: atorvastatin (Lipitor): Instructed patient to stop atorvastatin while taking Paxlovid and restart atorvastatin 1 day after the completion of  Paxlovid.  Is patient on any meds from List 3? Yes. Patient is on meds from list 3. No meds require a provider visit and at least one med required RN to adjust.     Paxlovid has an approximate 90% reduction in hospitalization. Paxlovid can possibly cause altered sense of taste, diarrhea (loose, watery stools), high blood pressure, muscle aches.     Would patient like a Paxlovid prescription?   Yes.   Lab Results   Component Value Date    GFRESTIMATED >90 02/09/2023       Was last eGFR reduced? No, eGFR 60 or greater/ No Result on record. Patient can receive the normal renal function dose. Paxlovid Rx sent to [facility]   [address]     Temporary change to home medications: Lipitor    All medication adjustments (holds, etc) were discussed with the patient and patient was asked to repeat back (teachback) their med adjustment.  Did patient understand med adjustment? Yes, patient repeated back and understood correctly.        Reviewed the following instructions with the patient:    Paxlovid (nimatrelvir and ritonavir)    How it works  Two medicines (nirmatrelvir and ritonavir) are taken together. They stop the virus from growing. Less amount of virus is easier for your body to fight.    How to take    Medicine comes in a daily container with both medicine tablets. Take by mouth twice daily (once in the morning, once at night) for 5 days.    The number of tablets to take varies by patient.    Don't chew or break capsules. Swallow whole.    When to take  Take as soon as possible after positive COVID-19 test result, and within 5 days of your first symptoms.    Possible side effects  Can cause altered sense of taste, diarrhea (loose, watery stools), high blood pressure, muscle aches.    Christie Carranza RN

## 2023-02-16 NOTE — TELEPHONE ENCOUNTER
COVID Positive/Requesting COVID treatment    Patient is positive for COVID and requesting treatment options.    Date of positive COVID test (PCR or at home)? home  Current COVID symptoms: - cough, chest congestion, headache  Date COVID symptoms began: 02/15/2023    Message should be routed to clinic RN pool. Best phone number to use for call back: [phone]

## 2023-02-28 ENCOUNTER — TELEPHONE (OUTPATIENT)
Dept: FAMILY MEDICINE | Facility: CLINIC | Age: 63
End: 2023-02-28
Payer: COMMERCIAL

## 2023-02-28 NOTE — TELEPHONE ENCOUNTER
[facility] phone call message-patient reporting a symptom:     Symptom: Coughing; Covid positive (5 days)    Same Day Visit Offered: N/A    Additional comments: Pt have/had Covid; took the Covid medication but still have the cough. The patient would like know if she can take some cough medicine.     OK to leave message on voice mail? Yes    Primary language: English      needed? No    Call taken on February 28, 2023 at 8:22 AM by Anais Brown

## 2023-07-24 ENCOUNTER — OFFICE VISIT (OUTPATIENT)
Dept: FAMILY MEDICINE | Facility: CLINIC | Age: 63
End: 2023-07-24
Payer: COMMERCIAL

## 2023-07-24 ENCOUNTER — TELEPHONE (OUTPATIENT)
Dept: FAMILY MEDICINE | Facility: CLINIC | Age: 63
End: 2023-07-24

## 2023-07-24 VITALS
HEART RATE: 93 BPM | BODY MASS INDEX: 30.21 KG/M2 | DIASTOLIC BLOOD PRESSURE: 84 MMHG | OXYGEN SATURATION: 98 % | RESPIRATION RATE: 18 BRPM | WEIGHT: 160 LBS | HEIGHT: 61 IN | SYSTOLIC BLOOD PRESSURE: 128 MMHG

## 2023-07-24 DIAGNOSIS — R73.03 PRE-DIABETES: Primary | ICD-10-CM

## 2023-07-24 DIAGNOSIS — I10 ESSENTIAL HYPERTENSION: ICD-10-CM

## 2023-07-24 DIAGNOSIS — M54.50 CHRONIC MIDLINE LOW BACK PAIN WITHOUT SCIATICA: ICD-10-CM

## 2023-07-24 DIAGNOSIS — Z12.31 VISIT FOR SCREENING MAMMOGRAM: ICD-10-CM

## 2023-07-24 DIAGNOSIS — Z12.4 CERVICAL CANCER SCREENING: ICD-10-CM

## 2023-07-24 DIAGNOSIS — Z12.11 SCREEN FOR COLON CANCER: ICD-10-CM

## 2023-07-24 DIAGNOSIS — E06.3 HASHIMOTO'S THYROIDITIS: ICD-10-CM

## 2023-07-24 DIAGNOSIS — E11.9 DIABETES MELLITUS WITHOUT COMPLICATION (H): ICD-10-CM

## 2023-07-24 DIAGNOSIS — G89.29 CHRONIC MIDLINE LOW BACK PAIN WITHOUT SCIATICA: ICD-10-CM

## 2023-07-24 LAB
ANION GAP SERPL CALCULATED.3IONS-SCNC: 14 MMOL/L (ref 7–15)
BUN SERPL-MCNC: 14.3 MG/DL (ref 8–23)
CALCIUM SERPL-MCNC: 9.5 MG/DL (ref 8.8–10.2)
CHLORIDE SERPL-SCNC: 105 MMOL/L (ref 98–107)
CHOLEST SERPL-MCNC: 112 MG/DL
CREAT SERPL-MCNC: 0.68 MG/DL (ref 0.51–0.95)
CREAT UR-MCNC: 89.6 MG/DL
DEPRECATED HCO3 PLAS-SCNC: 21 MMOL/L (ref 22–29)
GFR SERPL CREATININE-BSD FRML MDRD: >90 ML/MIN/1.73M2
GLUCOSE SERPL-MCNC: 104 MG/DL (ref 70–99)
HBA1C MFR BLD: 6.1 % (ref 0–5.6)
HDLC SERPL-MCNC: 36 MG/DL
LDLC SERPL CALC-MCNC: 44 MG/DL
MICROALBUMIN UR-MCNC: 17.2 MG/L
MICROALBUMIN/CREAT UR: 19.2 MG/G CR (ref 0–25)
NONHDLC SERPL-MCNC: 76 MG/DL
POTASSIUM SERPL-SCNC: 3.8 MMOL/L (ref 3.4–5.3)
SODIUM SERPL-SCNC: 140 MMOL/L (ref 136–145)
TRIGL SERPL-MCNC: 158 MG/DL
TSH SERPL DL<=0.005 MIU/L-ACNC: 0.82 UIU/ML (ref 0.3–4.2)

## 2023-07-24 PROCEDURE — 82274 ASSAY TEST FOR BLOOD FECAL: CPT | Performed by: STUDENT IN AN ORGANIZED HEALTH CARE EDUCATION/TRAINING PROGRAM

## 2023-07-24 PROCEDURE — 36415 COLL VENOUS BLD VENIPUNCTURE: CPT | Performed by: STUDENT IN AN ORGANIZED HEALTH CARE EDUCATION/TRAINING PROGRAM

## 2023-07-24 PROCEDURE — 84443 ASSAY THYROID STIM HORMONE: CPT | Performed by: STUDENT IN AN ORGANIZED HEALTH CARE EDUCATION/TRAINING PROGRAM

## 2023-07-24 PROCEDURE — 82570 ASSAY OF URINE CREATININE: CPT | Performed by: STUDENT IN AN ORGANIZED HEALTH CARE EDUCATION/TRAINING PROGRAM

## 2023-07-24 PROCEDURE — 99214 OFFICE O/P EST MOD 30 MIN: CPT | Mod: GC | Performed by: STUDENT IN AN ORGANIZED HEALTH CARE EDUCATION/TRAINING PROGRAM

## 2023-07-24 PROCEDURE — 80048 BASIC METABOLIC PNL TOTAL CA: CPT | Performed by: STUDENT IN AN ORGANIZED HEALTH CARE EDUCATION/TRAINING PROGRAM

## 2023-07-24 PROCEDURE — 80061 LIPID PANEL: CPT | Performed by: STUDENT IN AN ORGANIZED HEALTH CARE EDUCATION/TRAINING PROGRAM

## 2023-07-24 PROCEDURE — 82043 UR ALBUMIN QUANTITATIVE: CPT | Performed by: STUDENT IN AN ORGANIZED HEALTH CARE EDUCATION/TRAINING PROGRAM

## 2023-07-24 PROCEDURE — 83036 HEMOGLOBIN GLYCOSYLATED A1C: CPT | Performed by: STUDENT IN AN ORGANIZED HEALTH CARE EDUCATION/TRAINING PROGRAM

## 2023-07-24 RX ORDER — METFORMIN HCL 500 MG
1000 TABLET, EXTENDED RELEASE 24 HR ORAL
Qty: 180 TABLET | Refills: 3 | Status: SHIPPED | OUTPATIENT
Start: 2023-07-24 | End: 2024-07-16

## 2023-07-24 NOTE — PROGRESS NOTES
Assessment & Plan     Pre-diabetes  Patient with an ascending A1c today, 6.1 up from 5.9 to little less than a year ago.  Diet and exercise reviewed with the patient.  Patient concerned that she has put on a little weight over the last couple months, though pointed out at only 6 pounds.  Encouraged her to continue to make adjustments to her diet, limiting pure sugars and carbohydrates.  Increase metformin to 1000 mg daily.  Recheck A1c on return visit in 6 months.  - Lipid panel  - Hemoglobin A1c  - Albumin Random Urine Quantitative with Creat Ratio      Hashimoto's thyroiditis  Is not experiencing any symptoms of hypothyroidism with the exception of increased fatigue in the early evening.  Denies depression, chills or coldness, or significant weight gain.  Weight-based dosing of levothyroxine would be slightly higher than her current dosage.  We will recheck TSH today and make appropriate changes.  - TSH with free T4 reflex      Essential hypertension  BP initially high in office today, on recheck it was normalized.  Patient does have some element of whitecoat hypertension usually in her visits.  Does take her blood pressure at home and reports it has been normal.  We will recheck on return visit in 6 months and get a BMP today, as patient is on lisinopril plus hydrochlorothiazide.  - Basic metabolic panel      Screen for colon cancer  FIT testing ordered today for patient to  in the lab.  - Fecal colorectal cancer screen FIT - Future (S+30)    Visit for screening mammogram  Pneumogram ordered today.  - MA SCREENING DIGITAL BILAT - Future  (s+30)    Cervical cancer screening  Cervical cancer screening declined today, says she will do a Pap on next visit in 6 months.    Chronic midline low back pain without sciatica  Chronic in nature, arthritic.  She reports that it has been somewhat worse in the evenings after working in the [employer].  Has been taking Tylenol for this.  Education  "provided around diclofenac gel, offered today.  Patient will give it a try.  - diclofenac (VOLTAREN) 1 % topical gel  Dispense: 350 g; Refill: 3        BMI:   Estimated body mass index is 30.23 kg/m  as calculated from the following:    Height as of this encounter: 1.549 m (5' 1\").    Weight as of this encounter: 72.6 kg (160 lb).   Weight management plan: Discussed healthy diet and exercise guidelines        Return in about 6 months (around 1/24/2024) for Follow up, with PAP.    Kim Acosta DO  [facility] JESSICA Lowe is a 63 year old, presenting for the following health issues:  Diabetes (Pre-) and Thyroid Problem        2/9/2023     3:34 PM   Additional Questions   Roomed by Sidra Yusuf   Accompanied by Self     HPI     Grandson - got a job at [employer]  - 5 grandchildren, 1 great grandchild (girl)   - Community pollinator garden  - gets outside every day  - Looking forward to urban farm    Thyroid  - more tired by the early evening  - not getting cold  - no changes in mood  -     BP   - GREAT  - Always normal at home    Pre-diabetes  - limits bread  - sugar off/on  - cookies, sweets  - glenroy has diabetes  - getting outside every day  - creamer, full sugar  - oatmeal + salads,. Fruits and veggies  - drinking a lot of water    Weight  - up six lbs    Arthritis  - tylenol  - isidro-shay, icy hot        Objective    /84 (BP Location: Left arm, Patient Position: Sitting, Cuff Size: Adult Regular)   Pulse 93   Resp 18   Ht 1.549 m (5' 1\")   Wt 72.6 kg (160 lb)   LMP 11/04/2013 (LMP Unknown)   SpO2 98%   BMI 30.23 kg/m    Body mass index is 30.23 kg/m .  Physical Exam   Constitutional: No acute distress, sitting comfortably  Eyes: EOMI, no eye discharge, no icterus, injection or swelling.  Ears, nose, mouth, throat: Normocephalic, no nasal drainage, speaks clearly, no lip cracking.   Neck: Normal range of motion  CV: Extremeties well perfused, RRR, no murmurs rubs or " gallops  Respiratory: No audible wheezing, strido, cough, or other respiratory distress. Speaking in full sentences. CTAB  GI: Nondistended abdomen  MSK: Normal digits, moving extremeties well, symmetric strength visible throughout.  Skin: No visible rashes, bruising or other skin lesions.   Neuro: AOx3  Psych: Cooperative, mood, thought and judgement appropriate to situation.

## 2023-07-24 NOTE — PATIENT INSTRUCTIONS
It was great to see you today! Thanks for coming to [facility]!      Here is the plan from today's visit    Voltaren gel x4 times a day, should start working in a week or two  I will call you with your results if there's anything we need to change or anything abnormal.   If you do not hear from us everything is ok and I will send a letter.      Thank you for coming to [facility] today.  Lab Testing:  **If you had lab testing today and your results are reassuring or normal they will be mailed to you or sent through [email or web address] within 7 days.   **If the lab tests need quick action we will call you with the results.  **If you are having labs done on a different day, please call [phone] to schedule at [facility] or [phone] for other [facility] locations. Labs do not offer walk-in appointments.  The phone number we will call with results is # [phone] (home) . If this is not the best number please call our clinic and change the number.    Medication Refills:  If you need any refills please call your pharmacy and they will contact us.   If you need to  your refill at a new pharmacy, please contact the new pharmacy directly. The new pharmacy will help you get your medications transferred faster.       Scheduling, Urgent Medical Concerns (24 hours a day!), or ultrasound [phone] (8-5:00 M-F)    Referrals: If a referral was made to an [facility] specialty provider and you do not get a call from central scheduling, please refer to directions on your visit summary or call our office during normal business hours for assistance.     If a Mammogram was ordered for you at the Breast Center call [phone] to schedule or change your appointment.  If you had an XRay/CT/Ultrasound/MRI ordered the number is [phone] to schedule or change your radiology appointment.       Kim Acosta, DO  Pronouns: she/her/hers  Resident Physician  [facility] - [facility]/  [facility]    Department of Family Medicine and Community Health

## 2023-07-24 NOTE — TELEPHONE ENCOUNTER
Called patient and relayed provider message, she expressed understanding.    Samantha Chaudhry RN

## 2023-07-24 NOTE — TELEPHONE ENCOUNTER
----- Message from Kim Acosta DO sent at 7/24/2023  3:31 PM CDT -----  Could you reach out and let this patient know that her A1C is slightly up at 6.1, and I'd like to raise her metformin up to 1000/day (ordered for her). OK to still follow up in 6 months. Her other labs are unremarkable. Thyroid medication should stay the same.     Kim Acosta DO  Pronouns: she/they  Resident Physician, PGY3  [facility]/ [facility]    Department of Family Medicine and Community [facility]

## 2023-07-26 LAB — HEMOCCULT STL QL IA: NEGATIVE

## 2023-08-02 ENCOUNTER — TELEPHONE (OUTPATIENT)
Dept: FAMILY MEDICINE | Facility: CLINIC | Age: 63
End: 2023-08-02
Payer: COMMERCIAL

## 2023-08-02 NOTE — TELEPHONE ENCOUNTER
[facility] phone call message- general phone call:    Reason for call: Chrissy states that she was recently put on a higher dose of metformin and she is now having headaches and would like to know what to do. She also states that she cut sugar and that that could be contributing to the headaches.     Return call needed: Yes    OK to leave a message on voice mail? Yes    Primary language: English      needed? No    Call taken on August 2, 2023 at 8:40 AM by Alex Reyes

## 2023-08-02 NOTE — TELEPHONE ENCOUNTER
"Called patient to triage    Patient saw Dr. Acosta on 7/24/23 when it was decided to increase Metformin to 1000 mg daily at dinnertime.    Patient states the headaches started 2 days ago, are located in the front of the head, forehead area. She has taken acetaminophen for the headaches but it does not make the headache go away, \"just stays all day\".    Patient drinks plenty of water, and only other change is she has completely cut out sugar from her diet.    Sending to provider to review.    Samantha Chaudhry RN    "

## 2023-08-04 NOTE — TELEPHONE ENCOUNTER
Called patient to let her know that Dr. Acosta does not feel the headaches are r/t the Metformin, more likely r/t the cutting out sugar.    Patient states that her headaches have resolved, she agrees that they were most likely r/t the sugar.    Samantha Chaudhry RN

## 2023-08-07 ENCOUNTER — TELEPHONE (OUTPATIENT)
Dept: FAMILY MEDICINE | Facility: CLINIC | Age: 63
End: 2023-08-07

## 2023-08-07 NOTE — TELEPHONE ENCOUNTER
Called patient she has had a pinched nerve in the right shoulder blade for 4 days, she has been doing a lot of yard work and thinks this is the cause. No other symptoms.    Patient has been taking tylenol, and icing.    I suggested that she try ibuprofen instead of tylenol, try a topical medication such as Andrew's Vapo Rub and do some gentle stretching. If this does not help in the next day or so to call back so we can get her in for same day appointment.    Samantha Chaudhry RN

## 2023-12-28 DIAGNOSIS — R06.09 EXERTIONAL DYSPNEA: ICD-10-CM

## 2023-12-29 RX ORDER — ATORVASTATIN CALCIUM 40 MG/1
40 TABLET, FILM COATED ORAL DAILY
Qty: 90 TABLET | Refills: 3 | Status: SHIPPED | OUTPATIENT
Start: 2023-12-29

## 2023-12-29 NOTE — TELEPHONE ENCOUNTER
Contacted by pharmacy that patient requests refill of atorvastatin. Recent lipids in 7/2023 showed total 112, LDL 44. Will fill as currently prescribed  Mack Spencer MD

## 2024-02-13 ENCOUNTER — OFFICE VISIT (OUTPATIENT)
Dept: FAMILY MEDICINE | Facility: CLINIC | Age: 64
End: 2024-02-13
Payer: COMMERCIAL

## 2024-02-13 VITALS
OXYGEN SATURATION: 99 % | HEART RATE: 82 BPM | SYSTOLIC BLOOD PRESSURE: 133 MMHG | DIASTOLIC BLOOD PRESSURE: 84 MMHG | HEIGHT: 61 IN | BODY MASS INDEX: 26.55 KG/M2 | RESPIRATION RATE: 20 BRPM | WEIGHT: 140.6 LBS

## 2024-02-13 DIAGNOSIS — Z12.4 CERVICAL CANCER SCREENING: ICD-10-CM

## 2024-02-13 DIAGNOSIS — E66.811 CLASS 1 OBESITY DUE TO EXCESS CALORIES WITH SERIOUS COMORBIDITY AND BODY MASS INDEX (BMI) OF 31.0 TO 31.9 IN ADULT: ICD-10-CM

## 2024-02-13 DIAGNOSIS — N81.9 VAGINAL VAULT PROLAPSE: ICD-10-CM

## 2024-02-13 DIAGNOSIS — I10 BENIGN ESSENTIAL HYPERTENSION: ICD-10-CM

## 2024-02-13 DIAGNOSIS — E66.09 CLASS 1 OBESITY DUE TO EXCESS CALORIES WITH SERIOUS COMORBIDITY AND BODY MASS INDEX (BMI) OF 31.0 TO 31.9 IN ADULT: ICD-10-CM

## 2024-02-13 DIAGNOSIS — R73.03 PRE-DIABETES: Primary | ICD-10-CM

## 2024-02-13 DIAGNOSIS — E03.9 HYPOTHYROIDISM, UNSPECIFIED TYPE: ICD-10-CM

## 2024-02-13 DIAGNOSIS — E06.3 HASHIMOTO'S THYROIDITIS: ICD-10-CM

## 2024-02-13 LAB — HBA1C MFR BLD: 6 % (ref 0–5.6)

## 2024-02-13 PROCEDURE — 36415 COLL VENOUS BLD VENIPUNCTURE: CPT | Performed by: STUDENT IN AN ORGANIZED HEALTH CARE EDUCATION/TRAINING PROGRAM

## 2024-02-13 PROCEDURE — 99214 OFFICE O/P EST MOD 30 MIN: CPT | Mod: 25 | Performed by: STUDENT IN AN ORGANIZED HEALTH CARE EDUCATION/TRAINING PROGRAM

## 2024-02-13 PROCEDURE — 83036 HEMOGLOBIN GLYCOSYLATED A1C: CPT | Performed by: STUDENT IN AN ORGANIZED HEALTH CARE EDUCATION/TRAINING PROGRAM

## 2024-02-13 PROCEDURE — 88175 CYTOPATH C/V AUTO FLUID REDO: CPT | Performed by: STUDENT IN AN ORGANIZED HEALTH CARE EDUCATION/TRAINING PROGRAM

## 2024-02-13 PROCEDURE — 90471 IMMUNIZATION ADMIN: CPT | Performed by: STUDENT IN AN ORGANIZED HEALTH CARE EDUCATION/TRAINING PROGRAM

## 2024-02-13 PROCEDURE — 90678 RSV VACC PREF BIVALENT IM: CPT | Performed by: STUDENT IN AN ORGANIZED HEALTH CARE EDUCATION/TRAINING PROGRAM

## 2024-02-13 PROCEDURE — 87624 HPV HI-RISK TYP POOLED RSLT: CPT | Performed by: STUDENT IN AN ORGANIZED HEALTH CARE EDUCATION/TRAINING PROGRAM

## 2024-02-13 RX ORDER — LEVOTHYROXINE SODIUM 112 UG/1
112 TABLET ORAL DAILY
Qty: 90 TABLET | Refills: 3 | Status: SHIPPED | OUTPATIENT
Start: 2024-02-13

## 2024-02-13 RX ORDER — LISINOPRIL AND HYDROCHLOROTHIAZIDE 12.5; 2 MG/1; MG/1
1 TABLET ORAL DAILY
Qty: 90 TABLET | Refills: 3 | Status: SHIPPED | OUTPATIENT
Start: 2024-02-13

## 2024-02-13 NOTE — LETTER
February 20, 2024      Chrissy Looney  [address] [facility] [address]        Dear Pola! Your results are back. Your A1C is in pre-diabetes range (good work!) and your PAP smear was normal with no signs of cancer. No need to get these in the future if you don't want to.       Resulted Orders   Pap Screen with HPV - recommended age 30 - 65 years   Result Value Ref Range    Interpretation        Negative for Intraepithelial Lesion or Malignancy (NILM)    Comment         Papanicolaou Test Limitations:  Cervical cytology is a screening test with limited sensitivity, and regular screening is critical for cancer prevention.  Pap tests are primarily effective for the diagnosis/prevention of squamous cell carcinoma, not adenocarcinoma or other cancers.        Specimen Adequacy       Satisfactory for evaluation, endocerv/transformation zone component absent, atrophy    Clinical Information       none      Reflex Testing Yes regardless of result     Previous Abnormal?       No      Performing Labs       The technical component of this testing was completed at [facility]     Hemoglobin A1c   Result Value Ref Range    Hemoglobin A1C 6.0 (H) 0.0 - 5.6 %      Comment:      Normal <5.7%   Prediabetes 5.7-6.4%    Diabetes 6.5% or higher     Note: Adopted from ADA consensus guidelines.       If you have any questions or concerns, please call the clinic at the number listed above.       Sincerely,    Kim Acosta, DO  Pronouns: she/they  Resident Physician, PGY3  [facility]/ [facility]    Department of Family Medicine and [facility]

## 2024-02-13 NOTE — PATIENT INSTRUCTIONS
It was great to see you today! Thanks for coming to [facility]!      Here is the plan from today's visit    A1C today  We will reach out with any abnormal results, otherwise you'll get a letter in the mail.       Thank you for coming to [facility] today.  Lab Testing:  **If you had lab testing today and your results are reassuring or normal they will be mailed to you or sent through [employer] within 7 days.   **If the lab tests need quick action we will call you with the results.  **If you are having labs done on a different day, please call [phone] to schedule at [facility] or [phone] for other [facility] Outpatient Lab locations. Labs do not offer walk-in appointments.  The phone number we will call with results is # [phone] (home) . If this is not the best number please call our clinic and change the number.    Medication Refills:  If you need any refills please call your pharmacy and they will contact us.   If you need to  your refill at a new pharmacy, please contact the new pharmacy directly. The new pharmacy will help you get your medications transferred faster.       Scheduling, Urgent Medical Concerns (24 hours a day!), or ultrasound [phone] (8-5:00 M-F)    Referrals: If a referral was made to an [facility] specialty provider and you do not get a call from central scheduling, please refer to directions on your visit summary or call our office during normal business hours for assistance.     If a Mammogram was ordered for you at the [facility] call [phone] to schedule or change your appointment.  If you had an XRay/CT/Ultrasound/MRI ordered the number is [phone] to schedule or change your radiology appointment.       Kim Acosta, DO  Pronouns: she/her/hers  Resident Physician  [facility]/ [facility]    Department of Family Medicine and Community Health

## 2024-02-13 NOTE — PROGRESS NOTES
"  Assessment & Plan     Pre-diabetes  Class 1 obestiy due to excess calories, BMI 31,31.9  Stable pre-diabetes with last A1c 6.1 (7/24/23). Increased metformin 1000mg from 500mg last visit. Unremarkable diabetic foot exam. Due for A1c check today.  - continue metformin 1000mg daily  - A1C    Hashimoto's thyroiditis   - continue levothyroxine 112mcg daily    Benign essential hypertension  - continue lisinopril-hydrochlorothiazide (ZESTORETIC) 20-12.5 MG tablet; Take 1 tablet by mouth daily    Cervical cancer screening  Vaginal vault prolapse  Last pap 10/21/22 NILM. Not currently sexually active and declined STI testing. Pelvic example notable for mild vaginal vault prolapse.   - Pap Screen with HPV - recommended age 30 - 65 years  - will follow up with results    Lily Aponte MS3   I, Kim Acosta DO,  was present with the medical student who participated in the service and in the documentation of this note. I have verified the history and personally performed the physical exam and medical decision making. I have verified and edited the note, which accurately reflects my assessment of the patient and the plan of care.  Kim Acosta DO  Pronouns: she/they  Resident Physician, PGY3  [facility]/ \[facility]\"" Family Medicine [facility]    Department of Family Medicine and Community Health       BMI  Estimated body mass index is 26.57 kg/m  as calculated from the following:    Height as of this encounter: 1.549 m (5' 1\").    Weight as of this encounter: 63.8 kg (140 lb 9.6 oz).       Return in about 6 months (around 8/13/2024) for DM follow up.    Subjective   Chrissy is a 63 year old, presenting for the following health issues:  Follow Up (Diabetes ) and Gyn Exam (PAP)      2/13/2024     9:16 AM   Additional Questions   Roomed by cristal   Accompanied by Self     HPI     Cervical Cancer Screening - Last pap 10/21/22 NILM. Not currently sexually active and declined STI testing.    Pre-diabetes - Last A1c 6.1 " "(7/24/23) and is due for recheck today. Increased dose to metformin 1000mg. Pt shares that she has not been monitoring blood glucose at home.     Hashimoto's thyroiditis - no sx of hyper- or hypothyroidism and is satisfied with current levothyroxine 112mcg. Needs refill today.    HTN - blood pressure well controlled on lisinopril and hydrochlorothiazide. Shares that she does not regularly check BP at home.        Objective    /84   Pulse 82   Resp 20   Ht 1.549 m (5' 1\")   Wt 63.8 kg (140 lb 9.6 oz)   LMP 11/04/2013 (LMP Unknown)   SpO2 99%   BMI 26.57 kg/m    Body mass index is 26.57 kg/m .  Physical Exam     General No acute distress, atraumatic  Resp: No respiratory distress, no accessory muscle use  Card: well perfused, no cyanosis  : no lesions, lacerations, cysts, or rashes noted on external genitalia. Mild vaginal prolapse present. No active bleeding, purulent discharge. Cervix, vaginal mucosa mildly atrophic  Neuro: Moving all extremities. Pain and sensation intact on b/l diabetic foot exam. No lesions noted on plantar or dorsal surfaces of b/l feet.   Psych: Normal mood, appropriate affect         Signed Electronically by: Kim Acosta DO  "

## 2024-02-13 NOTE — PROGRESS NOTES
"  Assessment & Plan     Pre-diabetes  ***  - Hemoglobin A1c  - Hemoglobin A1c    Hashimoto's thyroiditis  ***    Class 1 obesity due to excess calories with serious comorbidity and body mass index (BMI) of 31.0 to 31.9 in adult  ***    Hypothyroidism, unspecified type  ***  - levothyroxine (SYNTHROID/LEVOTHROID) 112 MCG tablet  Dispense: 90 tablet; Refill: 3    Benign essential hypertension  ***  - lisinopril-hydrochlorothiazide (ZESTORETIC) 20-12.5 MG tablet  Dispense: 90 tablet; Refill: 3    Cervical cancer screening  ***  - Pap Screen with HPV - recommended age 30 - 65 years    Vaginal vault prolapse  ***      Ordering of each unique test  Prescription drug management        BMI  Estimated body mass index is 26.57 kg/m  as calculated from the following:    Height as of this encounter: 1.549 m (5' 1\").    Weight as of this encounter: 63.8 kg (140 lb 9.6 oz).             Return in about 6 months (around 8/13/2024) for DM follow up.    Subjective   Chrissy is a 63 year old, presenting for the following health issues:  Follow Up (Diabetes ) and Gyn Exam (PAP)    HPI     Pre-Diabetes    HTN    Dyspnea      Objective    /84   Pulse 82   Resp 20   Ht 1.549 m (5' 1\")   Wt 63.8 kg (140 lb 9.6 oz)   LMP 11/04/2013 (LMP Unknown)   SpO2 99%   BMI 26.57 kg/m    Body mass index is 26.57 kg/m .  Physical Exam   {Exam List (Optional):478945}    {Diagnostic Test Results (Optional):458109}        Signed Electronically by: iKm Acosta DO  {Email feedback regarding this note to [email or web address]   :630727}  "

## 2024-02-15 LAB
BKR LAB AP GYN ADEQUACY: NORMAL
BKR LAB AP GYN INTERPRETATION: NORMAL
BKR LAB AP HPV REFLEX: NORMAL
BKR LAB AP PREVIOUS ABNORMAL: NORMAL
PATH REPORT.COMMENTS IMP SPEC: NORMAL
PATH REPORT.COMMENTS IMP SPEC: NORMAL
PATH REPORT.RELEVANT HX SPEC: NORMAL

## 2024-02-22 LAB
HUMAN PAPILLOMA VIRUS 16 DNA: NEGATIVE
HUMAN PAPILLOMA VIRUS 18 DNA: NEGATIVE
HUMAN PAPILLOMA VIRUS FINAL DIAGNOSIS: NORMAL
HUMAN PAPILLOMA VIRUS OTHER HR: NEGATIVE

## 2024-04-25 ENCOUNTER — ANCILLARY PROCEDURE (OUTPATIENT)
Dept: GENERAL RADIOLOGY | Facility: CLINIC | Age: 64
End: 2024-04-25
Attending: FAMILY MEDICINE
Payer: COMMERCIAL

## 2024-04-25 ENCOUNTER — OFFICE VISIT (OUTPATIENT)
Dept: FAMILY MEDICINE | Facility: CLINIC | Age: 64
End: 2024-04-25
Payer: COMMERCIAL

## 2024-04-25 VITALS
RESPIRATION RATE: 14 BRPM | WEIGHT: 143 LBS | HEART RATE: 93 BPM | BODY MASS INDEX: 27 KG/M2 | SYSTOLIC BLOOD PRESSURE: 135 MMHG | OXYGEN SATURATION: 99 % | DIASTOLIC BLOOD PRESSURE: 84 MMHG | HEIGHT: 61 IN | TEMPERATURE: 97.7 F

## 2024-04-25 DIAGNOSIS — I10 ESSENTIAL HYPERTENSION: ICD-10-CM

## 2024-04-25 DIAGNOSIS — M25.561 ACUTE PAIN OF RIGHT KNEE: ICD-10-CM

## 2024-04-25 DIAGNOSIS — M25.561 ACUTE PAIN OF RIGHT KNEE: Primary | ICD-10-CM

## 2024-04-25 PROCEDURE — 73562 X-RAY EXAM OF KNEE 3: CPT | Mod: RT | Performed by: RADIOLOGY

## 2024-04-25 PROCEDURE — 99213 OFFICE O/P EST LOW 20 MIN: CPT | Performed by: FAMILY MEDICINE

## 2024-04-25 NOTE — PATIENT INSTRUCTIONS
Patient Education   Here is the plan from today's visit    1. Acute pain of right knee  We will get an x-ray to look for any fracture (broken bones)  Use Tylenol and Diclofenac gel for pain.   Call if not improved in another 1 week, then I would recommend physical therapy.     2. Essential hypertension  Your blood pressure is close to goal today.           Please call or return to clinic if your symptoms don't go away.    Follow up plan  Return in about 2 weeks (around 5/9/2024), or if symptoms worsen or fail to improve.    Thank you for coming to [facility] today.  Lab Testing:  **If you had lab testing today and your results are reassuring or normal they will be mailed to you or sent through [employer] within 7 days.   **If the lab tests need quick action we will call you with the results.  **If you are having labs done on a different day, please call [phone] to schedule at [facility] or [phone] for other [facility]patient Lab locations. Labs do not offer walk-in appointments.  The phone number we will call with results is # [phone] (home) . If this is not the best number please call our clinic and change the number.  Medication Refills:  If you need any refills please call your pharmacy and they will contact us.   If you need to  your refill at a new pharmacy, please contact the new pharmacy directly. The new pharmacy will help you get your medications transferred faster.   Scheduling:  If you have any concerns about today's visit or wish to schedule another appointment please call our office during normal business hours [phone] (8-5:00 M-F). If you can no longer make a scheduled visit, please cancel via [employer] or call us to cancel.   If a referral was made to an [facility] specialty provider and you do not get a call from central scheduling, please refer to directions on your visit summary or call our office during normal business hours for assistance.   If a  Mammogram was ordered for you at the Breast Center call [phone] to schedule or change your appointment.  If you had an XRay/CT/Ultrasound/MRI ordered the number is [phone] to schedule or change your radiology appointment.   [facility] has limited ultrasound appointments available on Wednesdays, if you would like your ultrasound at [facility], please call [phone] to schedule.   Medical Concerns:  If you have urgent medical concerns please call [phone] at any time of the day.    Britney Medel, DO

## 2024-04-25 NOTE — PROGRESS NOTES
"  Assessment & Plan     Acute pain of right knee  Most likely strain vs. Bruising that is taking longer to heal. Will check Xray given recent trauma. Ok to use acetaminophen as needed for pain and can add voltaren gel as well.   Recommended monitoring additional 1 week, if still has no improvement, then recommend PT referral with pt is agreeable to (asked patient to call us if needed).     - XR Knee Right 3 Views    Essential hypertension  Initially BP was elevated but improved on recheck. Continues to take medication as prescribed.       BMI  Estimated body mass index is 27.02 kg/m  as calculated from the following:    Height as of this encounter: 1.549 m (5' 1\").    Weight as of this encounter: 64.9 kg (143 lb).             Return in about 2 weeks (around 5/9/2024), or if symptoms worsen or fail to improve.    Subjective   Chrissy is a 64 year old, presenting for the following health issues:  Pain (Right knee pain from falling two weeks ago)        4/25/2024     8:53 AM   Additional Questions   Roomed by Genetin         4/25/2024    Information    services provided? No     HPI                 R medial knee pain for approximately 2 weeks.   Had accidental fall at home (tripped over something) and fell on hands and knees.   Bruising has healed.   No popping, clicking or instability.         Objective    /84 (BP Location: Right arm, Patient Position: Sitting, Cuff Size: Adult Regular)   Pulse 93   Temp 97.7  F (36.5  C) (Oral)   Resp 14   Ht 1.549 m (5' 1\")   Wt 64.9 kg (143 lb)   LMP 11/04/2013 (LMP Unknown)   SpO2 99%   BMI 27.02 kg/m    Body mass index is 27.02 kg/m .  Physical Exam  Constitutional:       General: She is not in acute distress.  HENT:      Head: Normocephalic and atraumatic.   Eyes:      Conjunctiva/sclera: Conjunctivae normal.   Pulmonary:      Effort: Pulmonary effort is normal.   Musculoskeletal:      Cervical back: Neck supple.      Comments: R knee: Full ROM. " No ecchymosis, erythema or edema noted. Mild tenderness over medial joint line. No ligamentous laxity. Negative thessaly.    Skin:     General: Skin is warm and dry.   Neurological:      Mental Status: She is alert and oriented to person, place, and time.   Psychiatric:         Judgment: Judgment normal.                Signed Electronically by: Britney Medel DO

## 2024-05-01 DIAGNOSIS — J30.89 ENVIRONMENTAL AND SEASONAL ALLERGIES: ICD-10-CM

## 2024-05-01 RX ORDER — CETIRIZINE HYDROCHLORIDE 10 MG/1
10 TABLET ORAL DAILY
Qty: 90 TABLET | Refills: 3 | Status: SHIPPED | OUTPATIENT
Start: 2024-05-01

## 2024-07-16 DIAGNOSIS — R73.03 PRE-DIABETES: ICD-10-CM

## 2024-07-16 RX ORDER — METFORMIN HCL 500 MG
1000 TABLET, EXTENDED RELEASE 24 HR ORAL
Qty: 180 TABLET | Refills: 3 | Status: SHIPPED | OUTPATIENT
Start: 2024-07-16

## 2024-10-14 ENCOUNTER — OFFICE VISIT (OUTPATIENT)
Dept: FAMILY MEDICINE | Facility: CLINIC | Age: 64
End: 2024-10-14
Payer: COMMERCIAL

## 2024-10-14 VITALS
TEMPERATURE: 97.9 F | HEART RATE: 77 BPM | DIASTOLIC BLOOD PRESSURE: 78 MMHG | SYSTOLIC BLOOD PRESSURE: 127 MMHG | RESPIRATION RATE: 12 BRPM | WEIGHT: 138.8 LBS | BODY MASS INDEX: 26.21 KG/M2 | OXYGEN SATURATION: 99 % | HEIGHT: 61 IN

## 2024-10-14 DIAGNOSIS — I10 ESSENTIAL HYPERTENSION: ICD-10-CM

## 2024-10-14 DIAGNOSIS — E06.3 HASHIMOTO'S THYROIDITIS: ICD-10-CM

## 2024-10-14 DIAGNOSIS — H90.6 MIXED CONDUCTIVE AND SENSORINEURAL HEARING LOSS OF BOTH EARS: Primary | ICD-10-CM

## 2024-10-14 DIAGNOSIS — Z12.4 CERVICAL CANCER SCREENING: ICD-10-CM

## 2024-10-14 DIAGNOSIS — Z12.31 VISIT FOR SCREENING MAMMOGRAM: ICD-10-CM

## 2024-10-14 DIAGNOSIS — Z12.11 SCREEN FOR COLON CANCER: ICD-10-CM

## 2024-10-14 DIAGNOSIS — R73.03 PRE-DIABETES: ICD-10-CM

## 2024-10-14 LAB
ALBUMIN SERPL BCG-MCNC: 4.5 G/DL (ref 3.5–5.2)
ALP SERPL-CCNC: 61 U/L (ref 40–150)
ALT SERPL W P-5'-P-CCNC: 26 U/L (ref 0–50)
ANION GAP SERPL CALCULATED.3IONS-SCNC: 12 MMOL/L (ref 7–15)
AST SERPL W P-5'-P-CCNC: 24 U/L (ref 0–45)
BILIRUB SERPL-MCNC: 0.5 MG/DL
BUN SERPL-MCNC: 16.8 MG/DL (ref 8–23)
CALCIUM SERPL-MCNC: 9.6 MG/DL (ref 8.8–10.4)
CHLORIDE SERPL-SCNC: 105 MMOL/L (ref 98–107)
CHOLEST SERPL-MCNC: 108 MG/DL
CREAT SERPL-MCNC: 0.63 MG/DL (ref 0.51–0.95)
CREAT UR-MCNC: 44.5 MG/DL
EGFRCR SERPLBLD CKD-EPI 2021: >90 ML/MIN/1.73M2
ERYTHROCYTE [DISTWIDTH] IN BLOOD BY AUTOMATED COUNT: 12.9 % (ref 10–15)
EST. AVERAGE GLUCOSE BLD GHB EST-MCNC: 123 MG/DL
FASTING STATUS PATIENT QL REPORTED: ABNORMAL
FASTING STATUS PATIENT QL REPORTED: ABNORMAL
GLUCOSE SERPL-MCNC: 103 MG/DL (ref 70–99)
HBA1C MFR BLD: 5.9 % (ref 0–5.6)
HCO3 SERPL-SCNC: 24 MMOL/L (ref 22–29)
HCT VFR BLD AUTO: 42.5 % (ref 35–47)
HDLC SERPL-MCNC: 46 MG/DL
HGB BLD-MCNC: 13.7 G/DL (ref 11.7–15.7)
HOLD SPECIMEN: NORMAL
LDLC SERPL CALC-MCNC: 48 MG/DL
MCH RBC QN AUTO: 29.5 PG (ref 26.5–33)
MCHC RBC AUTO-ENTMCNC: 32.2 G/DL (ref 31.5–36.5)
MCV RBC AUTO: 92 FL (ref 78–100)
MICROALBUMIN UR-MCNC: <12 MG/L
MICROALBUMIN/CREAT UR: NORMAL MG/G{CREAT}
NONHDLC SERPL-MCNC: 62 MG/DL
PLATELET # BLD AUTO: 259 10E3/UL (ref 150–450)
POTASSIUM SERPL-SCNC: 4.2 MMOL/L (ref 3.4–5.3)
PROT SERPL-MCNC: 7.6 G/DL (ref 6.4–8.3)
RBC # BLD AUTO: 4.64 10E6/UL (ref 3.8–5.2)
SODIUM SERPL-SCNC: 141 MMOL/L (ref 135–145)
TRIGL SERPL-MCNC: 70 MG/DL
TSH SERPL DL<=0.005 MIU/L-ACNC: 0.15 UIU/ML (ref 0.3–4.2)
WBC # BLD AUTO: 5.9 10E3/UL (ref 4–11)

## 2024-10-14 PROCEDURE — 36415 COLL VENOUS BLD VENIPUNCTURE: CPT | Performed by: FAMILY MEDICINE

## 2024-10-14 PROCEDURE — 82043 UR ALBUMIN QUANTITATIVE: CPT | Performed by: FAMILY MEDICINE

## 2024-10-14 PROCEDURE — 84443 ASSAY THYROID STIM HORMONE: CPT | Performed by: FAMILY MEDICINE

## 2024-10-14 PROCEDURE — 85027 COMPLETE CBC AUTOMATED: CPT | Performed by: FAMILY MEDICINE

## 2024-10-14 PROCEDURE — 80061 LIPID PANEL: CPT | Performed by: FAMILY MEDICINE

## 2024-10-14 PROCEDURE — 99214 OFFICE O/P EST MOD 30 MIN: CPT | Performed by: FAMILY MEDICINE

## 2024-10-14 PROCEDURE — 80053 COMPREHEN METABOLIC PANEL: CPT | Performed by: FAMILY MEDICINE

## 2024-10-14 PROCEDURE — 83036 HEMOGLOBIN GLYCOSYLATED A1C: CPT | Performed by: FAMILY MEDICINE

## 2024-10-14 PROCEDURE — 82570 ASSAY OF URINE CREATININE: CPT | Performed by: FAMILY MEDICINE

## 2024-10-14 NOTE — LETTER
October 17, 2024      Chrissy Looney  [address] [facility] [address]        Dear Chrissy,    Thank you for getting your care at [facility]. Please see below for your normal fecal occult test (FIT) test results.    Resulted Orders   TSH   Result Value Ref Range    TSH 0.15 (L) 0.30 - 4.20 uIU/mL   Comprehensive metabolic panel   Result Value Ref Range    Sodium 141 135 - 145 mmol/L    Potassium 4.2 3.4 - 5.3 mmol/L    Carbon Dioxide (CO2) 24 22 - 29 mmol/L    Anion Gap 12 7 - 15 mmol/L    Urea Nitrogen 16.8 8.0 - 23.0 mg/dL    Creatinine 0.63 0.51 - 0.95 mg/dL    GFR Estimate >90 >60 mL/min/1.73m2      Comment:      eGFR calculated using 2021 CKD-EPI equation.    Calcium 9.6 8.8 - 10.4 mg/dL      Comment:      Reference intervals for this test were updated on 7/16/2024 to reflect our healthy population more accurately. There may be differences in the flagging of prior results with similar values performed with this method. Those prior results can be interpreted in the context of the updated reference intervals.    Chloride 105 98 - 107 mmol/L    Glucose 103 (H) 70 - 99 mg/dL    Alkaline Phosphatase 61 40 - 150 U/L    AST 24 0 - 45 U/L    ALT 26 0 - 50 U/L    Protein Total 7.6 6.4 - 8.3 g/dL    Albumin 4.5 3.5 - 5.2 g/dL    Bilirubin Total 0.5 <=1.2 mg/dL    Patient Fasting > 8hrs? Unknown    Hemoglobin A1c   Result Value Ref Range    Estimated Average Glucose 123 (H) <117 mg/dL    Hemoglobin A1C 5.9 (H) 0.0 - 5.6 %      Comment:      Normal <5.7%   Prediabetes 5.7-6.4%    Diabetes 6.5% or higher     Note: Adopted from ADA consensus guidelines.   Lipid panel   Result Value Ref Range    Cholesterol 108 <200 mg/dL    Triglycerides 70 <150 mg/dL    Direct Measure HDL 46 (L) >=50 mg/dL    LDL Cholesterol Calculated 48 <100 mg/dL    Non HDL Cholesterol 62 <130 mg/dL    Patient Fasting > 8hrs? Unknown     Narrative    Cholesterol  Desirable: < 200 mg/dL  Borderline High: 200 - 239 mg/dL  High: >= 240  mg/dL    Triglycerides  Normal: < 150 mg/dL  Borderline High: 150 - 199 mg/dL  High: 200-499 mg/dL  Very High: >= 500 mg/dL    Direct Measure HDL  Female: >= 50 mg/dL   Male: >= 40 mg/dL    LDL Cholesterol  Desirable: < 100 mg/dL  Above Desirable: 100 - 129 mg/dL   Borderline High: 130 - 159 mg/dL   High:  160 - 189 mg/dL   Very High: >= 190 mg/dL    Non HDL Cholesterol  Desirable: < 130 mg/dL  Above Desirable: 130 - 159 mg/dL  Borderline High: 160 - 189 mg/dL  High: 190 - 219 mg/dL  Very High: >= 220 mg/dL   Albumin Random Urine Quantitative with Creat Ratio   Result Value Ref Range    Creatinine Urine mg/dL 44.5 mg/dL      Comment:      The reference ranges have not been established in urine creatinine. The results should be integrated into the clinical context for interpretation.    Albumin Urine mg/L <12.0 mg/L      Comment:      The reference ranges have not been established in urine albumin. The results should be integrated into the clinical context for interpretation.    Albumin Urine mg/g Cr        Comment:      Unable to calculate, urine albumin and/or urine creatinine is outside detectable limits.  Microalbuminuria is defined as an albumin:creatinine ratio of 17 to 299 for males and 25 to 299 for females. A ratio of albumin:creatinine of 300 or higher is indicative of overt proteinuria.  Due to biologic variability, positive results should be confirmed by a second, first-morning random or 24-hour timed urine specimen. If there is discrepancy, a third specimen is recommended. When 2 out of 3 results are in the microalbuminuria range, this is evidence for incipient nephropathy and warrants increased efforts at glucose control, blood pressure control, and institution of therapy with an angiotensin-converting-enzyme (ACE) inhibitor (if the patient can tolerate it).     Fecal colorectal cancer screen FIT - Future (S+30)   Result Value Ref Range    Occult Blood Screen FIT Negative Negative   CBC with Platelets  and Reflex to Iron Studies   Result Value Ref Range    WBC Count 5.9 4.0 - 11.0 10e3/uL    RBC Count 4.64 3.80 - 5.20 10e6/uL    Hemoglobin 13.7 11.7 - 15.7 g/dL    Hematocrit 42.5 35.0 - 47.0 %    MCV 92 78 - 100 fL    MCH 29.5 26.5 - 33.0 pg    MCHC 32.2 31.5 - 36.5 g/dL    RDW 12.9 10.0 - 15.0 %    Platelet Count 259 150 - 450 10e3/uL   Extra Green Top (Lithium Heparin) Tube   Result Value Ref Range    Hold Specimen JIC        Please call the clinic for a clinic appointment to further reveiw these results.    Sincerely,    Kathleen Avendano MD

## 2024-10-14 NOTE — PROGRESS NOTES
"  Assessment & Plan   Mixed conductive and sensorineural hearing loss of both ears  She has noticed increased bilateral hearing loss over the past couple of months and thinks that there is a lot of wax in her hearing aids. Recommend audiology referral to having hearing aids checked and adjusted as needed.    - Adult Audiology  Referral; Future    Pre-diabetes  Stable pre-diabetes with last A1c 6.0 (2/14/24). She is on metformin 1000mg. No new concerns today. Unremarkable diabetic foot exam. Due to for labs today.   - Continue metformin 1000mg daily.   - Hemoglobin A1c  - CBC with Platelets and Reflex to Iron Studies    Essential hypertension  Well-controlled on lisinopril-hydrochlorothiazide. BP improved at 127/78 today. We discussed the importance of a healthy diet and exercise. She was given a handout today on mediterranean diet today. Due for labs today.   - Continue lisinopril-hydrochlorothiazide (ZESTORETIC) 20-12.5 MG tablet daily.   - Comprehensive metabolic panel  - Lipid panel  - Albumin Random Urine Quantitative with Creat Ratio  - CBC with Platelets and Reflex to Iron Studies    Hashimoto's thyroiditis  Stable and well controlled. No new concerns today. Due for labs.   - Continue levothyroxine 112mcg daily   - TSH  - Comprehensive metabolic panel  - Lipid panel    Screen for colon cancer  - Fecal colorectal cancer screen FIT - Future (S+30); Future    Visit for screening mammogram  - MA Screening Bilateral w/ Agustin; Future    BMI  Estimated body mass index is 26.23 kg/m  as calculated from the following:    Height as of this encounter: 1.549 m (5' 1\").    Weight as of this encounter: 63 kg (138 lb 12.8 oz).     Return in about 6 months (around 4/14/2025) for Follow up.    Subjective   Chrissy is a 64 year old, presenting for the following health issues:  RECHECK (Diabetes and thyroid )      10/14/2024     8:02 AM   Additional Questions   Roomed by Ohn   Accompanied by Self         10/14/2024 " "   Information    services provided? No        HPI     Noticed an increase in hearing loss. Notices that hearing aids have wax. Would like referral to audiology today to re-check hearing aids.     HTN - well controlled on lisinopril and hydorchlorothiazide. Shares that she does not check her BP at home. She is an avid . Notes that she eats fruits and vegetables regularly and has been cutting out sugar. No medication refills needed today.     Pre-diabetes - Last A1c 6.0 on 2/12/24. Due for labs today. On metformin 1000mg daily. No new concerns today. Not monitoring blood glucose at home.     Hashimoto's thyroiditis - no sx of hyper-or hypo-thyroiditis and is satisfied with current levothyroxine of 112 mcg. No refill today.         Objective    /78   Pulse 77   Temp 97.9  F (36.6  C) (Oral)   Resp 12   Ht 1.549 m (5' 1\")   Wt 63 kg (138 lb 12.8 oz)   LMP 11/04/2013 (LMP Unknown)   SpO2 99%   BMI 26.23 kg/m    Body mass index is 26.23 kg/m .  Physical Exam  Constitutional:       Appearance: Normal appearance.   HENT:      Head: Normocephalic and atraumatic.      Right Ear: External ear normal.      Left Ear: External ear normal.   Eyes:      Extraocular Movements: Extraocular movements intact.      Conjunctiva/sclera: Conjunctivae normal.   Cardiovascular:      Rate and Rhythm: Normal rate.   Pulmonary:      Effort: Pulmonary effort is normal.   Musculoskeletal:      Cervical back: Normal range of motion.   Neurological:      General: No focal deficit present.      Mental Status: She is alert.   Psychiatric:         Mood and Affect: Mood normal.         Thought Content: Thought content normal.            Patient seen and discussed with Attending Physician. Dr. Avendano.   Sari March, MS3  Medical Student    Preceptor Attestation:   I was present with the medical student who participated in the service and in the documentation of this note. I have verified the history and " personally performed the physical exam and medical decision making. I have verified the content of the note, which accurately reflects my assessment of the patient and the plan of care.   Supervising Physician:  Kathleen Avendano MD.          Signed Electronically by: Kathleen Avendano MD

## 2024-10-15 PROCEDURE — 82274 ASSAY TEST FOR BLOOD FECAL: CPT | Performed by: FAMILY MEDICINE

## 2024-10-16 ENCOUNTER — TELEPHONE (OUTPATIENT)
Dept: FAMILY MEDICINE | Facility: CLINIC | Age: 64
End: 2024-10-16
Payer: COMMERCIAL

## 2024-10-16 NOTE — TELEPHONE ENCOUNTER
RN called pt to relay provider message below. Pt is scheduled with PCP on 10/23. Pt verbalized understanding.      Yue Erickson RN    ----- Message from Kathleen Avendano sent at 10/15/2024  5:43 PM CDT -----  Team - please call patient with results.    Please advise patient to follow up in clinic to discuss abnormal thyroid result. Otherwise results are within normal limits. Prediabetes is better as compared to 8 months ago.     Kathleen Avendano MD

## 2024-10-17 LAB — HEMOCCULT STL QL IA: NEGATIVE

## 2024-10-23 ENCOUNTER — OFFICE VISIT (OUTPATIENT)
Dept: FAMILY MEDICINE | Facility: CLINIC | Age: 64
End: 2024-10-23
Payer: COMMERCIAL

## 2024-10-23 VITALS
TEMPERATURE: 97.8 F | SYSTOLIC BLOOD PRESSURE: 135 MMHG | BODY MASS INDEX: 25.83 KG/M2 | HEIGHT: 61 IN | OXYGEN SATURATION: 98 % | WEIGHT: 136.8 LBS | HEART RATE: 79 BPM | DIASTOLIC BLOOD PRESSURE: 76 MMHG | RESPIRATION RATE: 12 BRPM

## 2024-10-23 DIAGNOSIS — E03.9 HYPOTHYROIDISM, UNSPECIFIED TYPE: Primary | ICD-10-CM

## 2024-10-23 DIAGNOSIS — Z71.85 VACCINE COUNSELING: ICD-10-CM

## 2024-10-23 DIAGNOSIS — Z12.4 CERVICAL CANCER SCREENING: ICD-10-CM

## 2024-10-23 DIAGNOSIS — Z12.31 VISIT FOR SCREENING MAMMOGRAM: ICD-10-CM

## 2024-10-23 PROCEDURE — 99214 OFFICE O/P EST MOD 30 MIN: CPT | Mod: GC

## 2024-10-23 PROCEDURE — G2211 COMPLEX E/M VISIT ADD ON: HCPCS

## 2024-10-23 RX ORDER — LEVOTHYROXINE SODIUM 100 UG/1
100 TABLET ORAL DAILY
Qty: 90 TABLET | Refills: 2 | Status: SHIPPED | OUTPATIENT
Start: 2024-10-23

## 2024-10-23 NOTE — PROGRESS NOTES
"  Assessment & Plan     Hypothyroidism, unspecified type  Last TSH suppressed at 0.15. Has had intentional 20 pound weight loss (cut out sugar) which is likely contributing to need for decreased dose. Decreased synthroid from 112mcg to 100, will do lab only visit in 6 weeks to recheck TSH.  - levothyroxine (SYNTHROID/LEVOTHROID) 100 MCG tablet; Take 1 tablet (100 mcg) by mouth daily.  - TSH with free T4 reflex; Future    Cervical cancer screening  11/28/18: NIL +HR HPV  04/18/21: NIL - HPV. Plan: repeat pap in 1 year (advise pre-treat with estrogen)  10/22/22: Negative for Intraepithelial Lesion or Malignancy (NILM), plan: Repeat in 3 years  2/13/24: NIL -HPV.  Discussed with patient today repeat testing versus no further testing, discussed risks and benefits of both, patient opts for no additional testing.    Vaccine counseling  Declines vaccines today. Due for covid/zoster/pneumococcal.    Visit for screening mammogram  Due for routine screening, patient aware and in process of scheduling.      Return for Routine preventive. And in 6 weeks for lab only visit for TSH recheck.    The longitudinal plan of care for the diagnosis(es)/condition(s) as documented were addressed during this visit. Due to the added complexity in care, I will continue to support Chrissy in the subsequent management and with ongoing continuity of care.      Munira Lowe is a 64 year old, presenting for the following health issues:  RECHECK      10/23/2024     9:24 AM   Additional Questions   Roomed by Ohn   Accompanied by Self         10/23/2024    Information    services provided? No        HPI     Coming in to follow up on thyroid  Having increased daytime sleepiness  Takes her thyroid medication in early morning 6am. Waits about half hour to an hour  Weight loss since cutting down sugar      Objective    /76   Pulse 79   Temp 97.8  F (36.6  C) (Oral)   Resp 12   Ht 1.549 m (5' 1\")   Wt 62.1 kg (136 lb " 12.8 oz)   LMP 11/04/2013 (LMP Unknown)   SpO2 98%   BMI 25.85 kg/m    Body mass index is 25.85 kg/m .  Physical Exam   Constitutional: awake, alert, cooperative, no apparent distress, and appears stated age  Eyes: Lids and lashes normal, pupils equal, round and reactive to light, extra ocular muscles intact, sclera clear, conjunctiva normal  ENT: normocepalic, without obvious abnormality. Thyroid exam normal.  Hematologic / Lymphatic: no cervical lymphadenopathy and no supraclavicular lymphadenopathy  Respiratory: No increased work of breathing, good air exchange, clear to auscultation bilaterally, no crackles or wheezing  Cardiovascular: Normal apical impulse, regular rate and rhythm, normal S1 and S2, no S3 or S4, and no murmur noted  GI: soft, non-distended and non-tender  Musculoskeletal: no lower extremity pitting edema present  full range of motion noted  tone is normal          Signed Electronically by: Marija Ochoa MD

## 2024-10-23 NOTE — PATIENT INSTRUCTIONS
Patient Education   Here is the plan from today's visit    1. Hypothyroidism, unspecified type  Decrease dose to 100mcg of synthroid daily.  Repeat your thyroid testing on or after December 4th.  This will be a lab only visit--just call the  and let them know you are coming in for a lab only visit.  - levothyroxine (SYNTHROID/LEVOTHROID) 100 MCG tablet; Take 1 tablet (100 mcg) by mouth daily.  Dispense: 90 tablet; Refill: 2  - TSH with free T4 reflex; Future    3. Vaccine counseling  You are due for your covid, shingles and pneumonia vaccine.      Please call or return to clinic if your symptoms don't go away.    Follow up plan  Return for Routine preventive.    Thank you for coming to [facility] today.  Lab Testing:  **If you had lab testing today and your results are reassuring or normal they will be mailed to you or sent through "[employer] within 7 days.   **If the lab tests need quick action we will call you with the results.  **If you are having labs done on a different day, please call [phone] to schedule at [facility] or [phone] for other [facility] Outpatient Lab locations. Labs do not offer walk-in appointments.  The phone number we will call with results is # [phone] (home) . If this is not the best number please call our clinic and change the number.  Medication Refills:  If you need any refills please call your pharmacy and they will contact us.   If you need to  your refill at a new pharmacy, please contact the new pharmacy directly. The new pharmacy will help you get your medications transferred faster.   Scheduling:  If you have any concerns about today's visit or wish to schedule another appointment please call our office during normal business hours [phone] (8-5:00 M-F). If you can no longer make a scheduled visit, please cancel via "[employer] or call us to cancel.   If a referral was made to an [facility] specialty provider and you do not get a call  from central scheduling, please refer to directions on your visit summary or call our office during normal business hours for assistance.   If a Mammogram was ordered for you at the [facility] call [phone] to schedule or change your appointment.  If you had an XRay/CT/Ultrasound/MRI ordered the number is [phone] to schedule or change your radiology appointment.   [facility] has limited ultrasound appointments available on Wednesdays, if you would like your ultrasound at [facility], please call [phone] to schedule.   Medical Concerns:  If you have urgent medical concerns please call [phone] at any time of the day.    Marija Ochoa MD

## 2024-12-04 ENCOUNTER — LAB (OUTPATIENT)
Dept: LAB | Facility: CLINIC | Age: 64
End: 2024-12-04
Payer: COMMERCIAL

## 2024-12-04 DIAGNOSIS — E03.9 HYPOTHYROIDISM, UNSPECIFIED TYPE: ICD-10-CM

## 2024-12-04 LAB — TSH SERPL DL<=0.005 MIU/L-ACNC: 0.66 UIU/ML (ref 0.3–4.2)

## 2024-12-11 DIAGNOSIS — R06.09 EXERTIONAL DYSPNEA: ICD-10-CM

## 2024-12-11 RX ORDER — ATORVASTATIN CALCIUM 40 MG/1
40 TABLET, FILM COATED ORAL DAILY
Qty: 90 TABLET | Refills: 3 | Status: SHIPPED | OUTPATIENT
Start: 2024-12-11

## 2024-12-30 ENCOUNTER — OFFICE VISIT (OUTPATIENT)
Dept: FAMILY MEDICINE | Facility: CLINIC | Age: 64
End: 2024-12-30
Payer: COMMERCIAL

## 2024-12-30 VITALS
WEIGHT: 140 LBS | TEMPERATURE: 98.1 F | HEIGHT: 61 IN | HEART RATE: 109 BPM | SYSTOLIC BLOOD PRESSURE: 160 MMHG | RESPIRATION RATE: 20 BRPM | OXYGEN SATURATION: 99 % | BODY MASS INDEX: 26.43 KG/M2 | DIASTOLIC BLOOD PRESSURE: 94 MMHG

## 2024-12-30 DIAGNOSIS — J02.9 ACUTE PHARYNGITIS, UNSPECIFIED ETIOLOGY: Primary | ICD-10-CM

## 2024-12-30 DIAGNOSIS — R59.9 SWOLLEN LYMPH NODES: ICD-10-CM

## 2024-12-30 DIAGNOSIS — I10 ESSENTIAL HYPERTENSION: ICD-10-CM

## 2024-12-30 LAB
FLUAV AG SPEC QL IA: NEGATIVE
FLUBV AG SPEC QL IA: NEGATIVE

## 2024-12-30 RX ORDER — METHYLPREDNISOLONE 4 MG/1
TABLET ORAL
Qty: 21 TABLET | Refills: 0 | Status: SHIPPED | OUTPATIENT
Start: 2024-12-30

## 2024-12-30 NOTE — PROGRESS NOTES
Assessment & Plan     Acute pharyngitis, unspecified etiology  Swollen lymph nodes  Symptoms most consistent with a viral pharyngitis. Denies SOB, fever, difficulty breathing. Centor criteria for strep pharyngitis was 0, no further testing indicated. Patient would like influenza testing at this time to rule out flu, rapid flu was negative. Patient advised of risks and potential benefits to Medrol dosepak for her swollen lymph nodes including worsening of her prediabetes, and patient chose to pursue with treatment. Advised to continue with symptomatic treatment.  - methylPREDNISolone (MEDROL DOSEPAK) 4 MG tablet therapy pack; Follow Package Directions  - Influenza A/B antigen    Essential hypertension  Elevated BP today 160/94, likely in the setting of acute illness. Patient has been taking her blood pressure medication regularly. Advised to continue her medications and get an appointment with her PCP at the next available opening.          Return for Follow up with PCP for BP at next available opening.    Munira Lowe is a 64 year old, presenting for the following health issues:  Cough and Mass (Swollen gland)        12/30/2024    10:10 AM   Additional Questions   Roomed by Doua   Accompanied by Self         12/30/2024    10:10 AM   Patient Reported Additional Medications   Patient reports taking the following new medications n/a         12/30/2024    Information    services provided? No     HPI   #Sore throat  #Headache  #Fatigue  #Swollen lymph node  - Started Friday myalgias  - Then sore throat and swollen lymph nodes, feels like her food is getting stuck in her throat  - Dry cough  - Tried ibuprofen and cough drops  - Headache started last night, diffuse  - Negative COVID 19  - Denies fevers, SOB  - Several family members are ill, got doxycycline, dose damari and prednisone    #HTN  - high blood pressure today, took medications: linsinopril and hydrochlorothiazide  - has headache,  "thinks this is from her illness  - denies SOB, chest pain, swelling, and blurry vision        Review of Systems  Constitutional, HEENT, cardiovascular, pulmonary, gi and gu systems are negative, except as otherwise noted.      Objective    BP (!) 160/94   Pulse 109   Temp 98.1  F (36.7  C) (Oral)   Resp 20   Ht 1.549 m (5' 1\")   Wt 63.5 kg (140 lb)   LMP 11/04/2013 (LMP Unknown)   SpO2 99%   BMI 26.45 kg/m    Body mass index is 26.45 kg/m .  Physical Exam  Vitals reviewed.   Constitutional:       Appearance: Normal appearance.   HENT:      Head: Normocephalic and atraumatic.      Nose: Congestion and rhinorrhea present.      Mouth/Throat:      Mouth: Mucous membranes are moist.      Pharynx: Posterior oropharyngeal erythema and postnasal drip present. No oropharyngeal exudate.      Tonsils: No tonsillar exudate.   Cardiovascular:      Rate and Rhythm: Normal rate and regular rhythm.   Pulmonary:      Effort: Pulmonary effort is normal. No respiratory distress.      Breath sounds: Normal breath sounds. No stridor. No wheezing or rhonchi.   Lymphadenopathy:      Cervical: Cervical adenopathy (tenderness) present.   Skin:     General: Skin is warm and dry.      Capillary Refill: Capillary refill takes less than 2 seconds.   Neurological:      Mental Status: She is alert.                Signed Electronically by: China Marin MD    "

## 2024-12-30 NOTE — PATIENT INSTRUCTIONS
Patient Education   Here is the plan from today's visit    1. Acute pharyngitis, unspecified etiology (Primary)    - methylPREDNISolone (MEDROL DOSEPAK) 4 MG tablet therapy pack; Follow Package Directions  Dispense: 21 tablet; Refill: 0  - Influenza A/B antigen    2. Swollen lymph nodes    - methylPREDNISolone (MEDROL DOSEPAK) 4 MG tablet therapy pack; Follow Package Directions  Dispense: 21 tablet; Refill: 0  - Influenza A/B antigen    3. Essential hypertension        Please call or return to clinic if your symptoms don't go away.    Follow up plan  No follow-ups on file.    Thank you for coming to [facility] today.  Lab Testing:  **If you had lab testing today and your results are reassuring or normal they will be mailed to you or sent through [employer] within 7 days.   **If the lab tests need quick action we will call you with the results.  **If you are having labs done on a different day, please call [phone] to schedule at [facility] or [phone] for other [facility] Outpatient Lab locations. Labs do not offer walk-in appointments.  The phone number we will call with results is # [phone] (home) . If this is not the best number please call our clinic and change the number.  Medication Refills:  If you need any refills please call your pharmacy and they will contact us.   If you need to  your refill at a new pharmacy, please contact the new pharmacy directly. The new pharmacy will help you get your medications transferred faster.   Scheduling:  If you have any concerns about today's visit or wish to schedule another appointment please call our office during normal business hours [phone] (8-5:00 M-F). If you can no longer make a scheduled visit, please cancel via [employer] or call us to cancel.   If a referral was made to an [facility] specialty provider and you do not get a call from central scheduling, please refer to directions on your visit summary or call our office  during normal business hours for assistance.   If a Mammogram was ordered for you at the [facility] call [phone] to schedule or change your appointment.  If you had an XRay/CT/Ultrasound/MRI ordered the number is [phone] to schedule or change your radiology appointment.   [facility] has limited ultrasound appointments available on Wednesdays, if you would like your ultrasound at [facility], please call [phone] to schedule.   Medical Concerns:  If you have urgent medical concerns please call [phone] at any time of the day.    China Marin MD

## 2025-01-02 ENCOUNTER — OFFICE VISIT (OUTPATIENT)
Dept: FAMILY MEDICINE | Facility: CLINIC | Age: 65
End: 2025-01-02
Payer: COMMERCIAL

## 2025-01-02 VITALS
TEMPERATURE: 99.1 F | SYSTOLIC BLOOD PRESSURE: 146 MMHG | OXYGEN SATURATION: 98 % | HEART RATE: 103 BPM | RESPIRATION RATE: 16 BRPM | DIASTOLIC BLOOD PRESSURE: 88 MMHG

## 2025-01-02 DIAGNOSIS — J06.9 VIRAL UPPER RESPIRATORY TRACT INFECTION WITH COUGH: Primary | ICD-10-CM

## 2025-01-02 DIAGNOSIS — I10 ESSENTIAL HYPERTENSION: ICD-10-CM

## 2025-01-02 DIAGNOSIS — J02.9 ACUTE PHARYNGITIS, UNSPECIFIED ETIOLOGY: ICD-10-CM

## 2025-01-02 NOTE — PATIENT INSTRUCTIONS
- Would reschedule hearing test as it might be abnormal for the next few weeks  - Could try a humidifier, just be sure to read the manual closely.   - Can continue with cough drops    - Follow-up with PCP regarding blood pressure when you're able     Come back in if you develop fevers or trouble breathing.       
MED

## 2025-01-02 NOTE — PROGRESS NOTES
Assessment & Plan     Viral upper respiratory tract infection with cough  Acute pharyngitis, unspecified etiology  Seen in short-interval follow-up. Pharyngitis symptoms much improved with steroids, cough persistent/worsening. Exam and history consistent with viral illness, mild, cough persisting. No indication for antibiotics at this time. Advised pt to return if she develops fevers, dyspnea. Counseled regarding expected course of illness, supportive cares for cough and comfort.    Essential Hypertension  Improved from last visit, still not at goal. Continues with home medications. May be elevated in the setting of acute illness. Advised to follow-up with PCP when recovered from current illness.     Return if symptoms worsen or fail to improve.    Munira Lowe is a 64 year old, presenting for the following health issues:  URI (Continued sx from previous visit. Patient states her cough and chest congestion has gotten worse. Still taking Medrol pack as instructed. )      1/2/2025     8:43 AM   Additional Questions   Roomed by Guevara         1/2/2025    Information    services provided? No     HPI     Cough is worse.   Has been drinking a lot of water, orange juice, resting.   Not getting good sleep.   Ear hurts, had to take out hearing aid. Ear plugged.     Sore throat is better now. Has a few doses left of methylprednisolone package. No longer feeling like things are getting stuck in her throat.     Having hearing test in a few weeks.     Breathing is ok. Has some upper-chest pain when coughing. No leg or foot swelling. No fevers. Two covid tests negative, flu test negative. Cough is dry. No vomiting. Chills once in a while, mostly no. Appetite is ok. Bowel movements are fine. No abdominal pain. Coughing a lot last night. Cough drops help.     Using pred pack and cough drops. Guzman's cough drops.         Objective    BP (!) 146/88   Pulse 103   Temp 99.1  F (37.3  C) (Temporal)   Resp  16   LMP 11/04/2013 (LMP Unknown)   SpO2 98%   There is no height or weight on file to calculate BMI.  Physical Exam  Constitutional:       General: She is not in acute distress.     Appearance: Normal appearance. She is not toxic-appearing.   HENT:      Ears:      Comments: Right ear not examined due to presence of hearing aid/pt preference. L ear - some pain with mobilization of canal, canal normal. Tympanic membrane with slight fullness, clear fluid behind membrane, no erythema, purulence.      Mouth/Throat:      Pharynx: Posterior oropharyngeal erythema present. No pharyngeal swelling or oropharyngeal exudate.      Tonsils: No tonsillar exudate.   Cardiovascular:      Rate and Rhythm: Regular rhythm. Tachycardia present.      Heart sounds: No murmur heard.  Pulmonary:      Effort: Pulmonary effort is normal. No respiratory distress.      Breath sounds: Normal breath sounds. No wheezing, rhonchi or rales.   Neurological:      Mental Status: She is alert.   Psychiatric:         Mood and Affect: Mood normal.         Behavior: Behavior normal.          Office Visit on 12/30/2024   Component Date Value Ref Range Status    Influenza A antigen 12/30/2024 Negative  Negative Final    Influenza B antigen 12/30/2024 Negative  Negative Final         Signed Electronically by: Lucie Bonds MD

## 2025-01-09 ENCOUNTER — TELEPHONE (OUTPATIENT)
Dept: FAMILY MEDICINE | Facility: CLINIC | Age: 65
End: 2025-01-09
Payer: COMMERCIAL

## 2025-01-09 NOTE — TELEPHONE ENCOUNTER
Spoke with patient who reports that her symptoms have not improved and feel like it could be getting worse. She has tried everything that was suggested from appointment on 1/2/25. She has been using a humidifier at night and continues to use the cough drops. Continues to have a hard time hearing due to the congestion. States her hearing aids aren't working as well as they were since she became sick. Her cough keeps her awake at night. Normally is uncontrollable around 2am and can't fall back asleep. Cough is dry, denies a fever or trouble breathing.    She wants to know if further tests can be done or what else could be going on. She finished her prednisone and notes no improvement. Per previous visit notes states to return if symptoms worsen or fail to improve or if she develops a fever or trouble breathing. Offered appointment tomorrow which patient agreed to.     Mi Pinto RN

## 2025-01-09 NOTE — TELEPHONE ENCOUNTER
[facility] phone call message- general phone call:    Reason for call: Patient was seen on 12/30 and 01/02 for an upper respiratory infection but she states that she is still having a cough and wants to know what should she do.    Return call needed: Yes    OK to leave a message on voice mail? Yes    Primary language: English      needed? No    Call taken on January 9, 2025 at 1:32 PM by Jessie Alberto

## 2025-01-10 ENCOUNTER — ANCILLARY PROCEDURE (OUTPATIENT)
Dept: GENERAL RADIOLOGY | Facility: CLINIC | Age: 65
End: 2025-01-10
Attending: FAMILY MEDICINE
Payer: COMMERCIAL

## 2025-01-10 DIAGNOSIS — R05.1 ACUTE COUGH: ICD-10-CM

## 2025-01-10 PROCEDURE — 71046 X-RAY EXAM CHEST 2 VIEWS: CPT | Mod: FY | Performed by: STUDENT IN AN ORGANIZED HEALTH CARE EDUCATION/TRAINING PROGRAM

## 2025-01-16 ENCOUNTER — DOCUMENTATION ONLY (OUTPATIENT)
Dept: AUDIOLOGY | Facility: CLINIC | Age: 65
End: 2025-01-16
Payer: COMMERCIAL

## 2025-01-16 DIAGNOSIS — H90.3 SENSORINEURAL HEARING LOSS, BILATERAL: Primary | ICD-10-CM

## 2025-01-17 NOTE — PROGRESS NOTES
The patient mailed her left hearing aid to the Audiology Clinic asking to have it repaired. Examination found the tonehook was broken and the tubing was dislodged from the earmold. The hearing aid and earmold were cleaned and the tubing and tonehook were replaced. A listening check subsequently found the hearing aid to be working properly and it was mailed back to the patient.    A clean & check charge is being billed for today's services.    Manpreet Carcamo  Audiology Clinic Assistant

## 2025-04-16 DIAGNOSIS — R73.03 PRE-DIABETES: ICD-10-CM

## 2025-04-16 DIAGNOSIS — J30.89 ENVIRONMENTAL AND SEASONAL ALLERGIES: ICD-10-CM

## 2025-04-17 RX ORDER — CETIRIZINE HYDROCHLORIDE 10 MG/1
10 TABLET ORAL DAILY
Qty: 90 TABLET | Refills: 3 | Status: SHIPPED | OUTPATIENT
Start: 2025-04-17

## 2025-04-17 RX ORDER — METFORMIN HYDROCHLORIDE 500 MG/1
1000 TABLET, EXTENDED RELEASE ORAL
Qty: 180 TABLET | Refills: 3 | Status: SHIPPED | OUTPATIENT
Start: 2025-04-17